# Patient Record
Sex: MALE | Race: WHITE | Employment: OTHER | URBAN - METROPOLITAN AREA
[De-identification: names, ages, dates, MRNs, and addresses within clinical notes are randomized per-mention and may not be internally consistent; named-entity substitution may affect disease eponyms.]

---

## 2017-01-31 ENCOUNTER — OFFICE VISIT (OUTPATIENT)
Dept: ENDOCRINOLOGY | Age: 67
End: 2017-01-31

## 2017-01-31 VITALS
SYSTOLIC BLOOD PRESSURE: 150 MMHG | WEIGHT: 223 LBS | HEART RATE: 87 BPM | DIASTOLIC BLOOD PRESSURE: 81 MMHG | HEIGHT: 78 IN | BODY MASS INDEX: 25.8 KG/M2

## 2017-01-31 DIAGNOSIS — I10 HTN (HYPERTENSION), BENIGN: ICD-10-CM

## 2017-01-31 DIAGNOSIS — R79.89 LOW TESTOSTERONE: Primary | ICD-10-CM

## 2017-01-31 DIAGNOSIS — B07.8 OTHER VIRAL WARTS: ICD-10-CM

## 2017-01-31 RX ORDER — BUPROPION HYDROCHLORIDE 300 MG/1
450 TABLET ORAL
COMMUNITY
Start: 2017-01-23 | End: 2019-01-24

## 2017-01-31 RX ORDER — DICLOFENAC SODIUM 10 MG/G
2 GEL TOPICAL
Status: ON HOLD | COMMUNITY
Start: 2017-01-21 | End: 2019-01-24 | Stop reason: SDUPTHER

## 2017-01-31 RX ORDER — TESTOSTERONE CYPIONATE 200 MG/ML
INJECTION INTRAMUSCULAR
Qty: 10 ML | Refills: 2 | Status: SHIPPED | OUTPATIENT
Start: 2017-01-31 | End: 2017-08-08 | Stop reason: SDUPTHER

## 2017-01-31 RX ORDER — GABAPENTIN 300 MG/1
CAPSULE ORAL
COMMUNITY
Start: 2017-01-13 | End: 2018-06-28

## 2017-01-31 RX ORDER — CHOLECALCIFEROL (VITAMIN D3) 125 MCG
CAPSULE ORAL
Status: ON HOLD | COMMUNITY
End: 2019-01-13

## 2017-01-31 NOTE — MR AVS SNAPSHOT
Visit Information Date & Time Provider Department Dept. Phone Encounter #  
 1/31/2017  2:10 PM Astrid Bowen, [address] [place] [address] Diabetes and Endocrinology [phone] Follow-up Instructions Return in about 6 months (around 7/31/2017). Upcoming Health Maintenance Date Due Hepatitis C Screening 1950 DTaP/Tdap/Td series (1 - Tdap) 1/8/1972 FOBT Q 1 YEAR AGE 50-75 1/8/2001 ZOSTER VACCINE AGE 60> 1/8/2011 GLAUCOMA SCREENING Q2Y 1/8/2016 MEDICARE YEARLY EXAM 1/8/2016 INFLUENZA AGE 9 TO ADULT 8/1/2016 Pneumococcal 65+ Low/Medium Risk (2 of 2 - PPSV23) 10/31/2017 Allergies as of 1/31/2017  Review Complete On: 1/31/2017 By: Astrid Bowen MD  
  
 Severity Noted Reaction Type Reactions Amitriptyline  09/11/2013    Other (comments)  
 hallucinating Neurontin [Gabapentin]  09/11/2013    Other (comments)  
 insomnia Percocet [Oxycodone-acetaminophen]  09/09/2014    Nausea Only, Anxiety Pt states he can tolerate small doses at 10 mg a day Current Immunizations  Reviewed on 10/31/2012 Name Date Influenza Vaccine 12/9/2014 Influenza Vaccine Split 10/31/2012, 10/1/2011 Pneumococcal Vaccine (Unspecified Type) 10/31/2012 Not reviewed this visit You Were Diagnosed With   
  
 Codes Comments Low testosterone    -  Primary ICD-10-CM: E29.1 ICD-9-CM: 257.2 HTN (hypertension), benign     ICD-10-CM: I10 
ICD-9-CM: 401.1 Vitals BP Pulse Height(growth percentile) Weight(growth percentile) BMI Smoking Status 150/81 87 6' 7\" (2.007 m) 223 lb (101.2 kg) 25.12 kg/m2 Never Smoker Vitals History BMI and BSA Data Body Mass Index Body Surface Area  
 25.12 kg/m 2 2.38 m 2 Preferred Pharmacy Pharmacy Name Phone Kaleb Diaz [address] [address] [facility] [phone] Your Updated Medication List  
  
   
 This list is accurate as of: 1/31/17  3:17 PM.  Always use your most recent med list.  
  
  
  
  
 ALEVE 220 mg Cap Generic drug:  naproxen sodium Take  by mouth. BD LUER-HANG SYRINGE 3 mL 21 gauge x 1\" Syrg Generic drug:  Syringe with Needle (Disp) USE AS DIRECTED INTRAMUSCULARLY EVERY 7 DAYS  
  
 buPROPion  mg XL tablet Commonly known as:  WELLBUTRIN XL  
  
 CALCIUM 600 WITH VITAMIN D3 600 mg(1,500mg) -400 unit Chew Generic drug:  Calcium-Cholecalciferol (D3)  
  
 carvedilol 12.5 mg tablet Commonly known as:  Layman Duarte Take 1 Tab by mouth two (2) times daily (with meals). CYMBALTA 60 mg capsule Generic drug:  DULoxetine Take 120 mg by mouth daily. gabapentin 300 mg capsule Commonly known as:  NEURONTIN HYDROcodone-acetaminophen  mg tablet Commonly known as:  Jyoti Nando Take  by mouth. 5 tabs daily  
  
 lisinopril 40 mg tablet Commonly known as:  Bermeo Donovan Take 1 tablet by mouth daily. LORazepam 1 mg tablet Commonly known as:  ATIVAN Take 1 Tab by mouth every eight (8) hours as needed. LYRICA 225 mg capsule Generic drug:  pregabalin 225 mg two (2) times a day. methadone 10 mg tablet Commonly known as:  DOLOPHINE Take 10 mg by mouth two (2) times a day. methylphenidate 10 mg tablet Commonly known as:  RITALIN  
3 tabs daily  
  
 omeprazole 20 mg capsule Commonly known as:  PRILOSEC  
TAKE ONE CAPSULE BY MOUTH EVERY DAY  
  
 polyethylene glycol 17 gram/dose powder Commonly known as:  MIRALAX  
  
 sildenafil 20 mg tablet Commonly known as:  REVATIO  
2-3 tablets daily as needed  
  
 testosterone cypionate 200 mg/mL injection Commonly known as:  DEPOTESTOTERONE CYPIONATE INJECT 0.5 ML INTRAMUSCULARLY EVERY 7 DAYS  
  
 tiZANidine 2 mg tablet Commonly known as:  ZANAFLEX  
  
 traZODone 150 mg tablet Commonly known as:  DESYREL  
TAKE ONE TABLET BY MOUTH EVERY EVENING  
  
 VOLTAREN 1 % Gel Generic drug:  diclofenac We Performed the Following HGB & HCT [03849 CPT(R)] TESTOSTERONE, FREE & TOTAL [52235 CPT(R)] Follow-up Instructions Return in about 6 months (around 7/31/2017). Patient Instructions Continue 0.5 ml testosterone Introducing [facility] SERVICES! Dear Raine Angel: Thank you for requesting a TripLingo account. Our records indicate that you already have an active TripLingo account. You can access your account anytime at [email or web address] Did you know that you can access your hospital and ER discharge instructions at any time in TripLingo? You can also review all of your test results from your hospital stay or ER visit. Additional Information If you have questions, please visit the Frequently Asked Questions section of the TripLingo website at [email or web address] Remember, TripLingo is NOT to be used for urgent needs. For medical emergencies, dial 911. Now available from your iPhone and Android! Please provide this summary of care documentation to your next provider. Your primary care clinician is listed as Neto Montemayor. If you have any questions after today's visit, please call [phone].

## 2017-01-31 NOTE — PROGRESS NOTES
History of Present Illness: Gayatri Alfaro is a 78 y.o. male presents for follow-up of low testosterone  He also has spinal stenosis and chronic pain. PCP is Dr Mady Wadsworth    HTN -carvedilol 12.5 mg BID and lisinopril 40 mg. Hypogonadism: taking testosterone cypionate 200 mg/ml -0.5 ml (100 mg) every 7 days on Fridays     No sx of BPH      Past Medical History   Diagnosis Date    Anemia NEC     Asthma     Chronic pain      Due to spinal stenosis    Constipation     HTN (hypertension)     Hypertension     Hypogonadism male     Other ill-defined conditions(379.89)      spinal stenosis    Psychiatric disorder      major depressive disorder    Sleep trouble     Spinal stenosis      Cervical and Lumbar    Vertebral compression fracture (HCC)      Current Outpatient Prescriptions   Medication Sig    naproxen sodium (ALEVE) 220 mg cap Take  by mouth.  GABAPENTIN PO Take  by mouth two (2) times a day.  BD LUER-HANG SYRINGE 3 mL 21 gauge x 1\" syrg USE AS DIRECTED INTRAMUSCULARLY EVERY 7 DAYS    sildenafil (REVATIO) 20 mg tablet 2-3 tablets daily as needed    testosterone cypionate (DEPOTESTOTERONE CYPIONATE) 200 mg/mL injection INJECT 0.5 ML INTRAMUSCULARLY EVERY 7 DAYS    methylphenidate (RITALIN) 10 mg tablet 3 tabs daily    LYRICA 225 mg capsule 225 mg two (2) times a day.  tiZANidine (ZANAFLEX) 2 mg tablet     CALCIUM 600 WITH VITAMIN D3 600 mg(1,500mg) -400 unit chew     methadone (DOLOPHINE) 10 mg tablet Take 10 mg by mouth two (2) times a day.  HYDROcodone-acetaminophen (NORCO)  mg tablet Take  by mouth. 5 tabs daily    carvedilol (COREG) 12.5 mg tablet Take 1 Tab by mouth two (2) times daily (with meals).  lisinopril (PRINIVIL, ZESTRIL) 40 mg tablet Take 1 tablet by mouth daily.     polyethylene glycol (MIRALAX) 17 gram/dose powder     traZODone (DESYREL) 150 mg tablet TAKE ONE TABLET BY MOUTH EVERY EVENING    LORazepam (ATIVAN) 1 mg tablet Take 1 Tab by mouth every eight (8) hours as needed.  omeprazole (PRILOSEC) 20 mg capsule TAKE ONE CAPSULE BY MOUTH EVERY DAY    DULoxetine (CYMBALTA) 60 mg capsule Take 120 mg by mouth daily.  buPROPion XL (WELLBUTRIN XL) 150 mg tablet Take 300 mg by mouth every twenty-four (24) hours.  Cyanocobalamin-Cobamamide 5,000-100 mcg lozg     fluticasone (FLONASE) 50 mcg/actuation nasal spray PLACE TWO SPRAYS IN EACH NOSTRIL DAILY     No current facility-administered medications for this visit. Allergies   Allergen Reactions    Amitriptyline Other (comments)     hallucinating    Neurontin [Gabapentin] Other (comments)     insomnia    Percocet [Oxycodone-Acetaminophen] Nausea Only and Anxiety     Pt states he can tolerate small doses at 10 mg a day     Physical Examination:  Visit Vitals    /81    Pulse 87    Ht 6' 7\" (2.007 m)    Wt 223 lb (101.2 kg)    BMI 25.12 kg/m2   -   - General: pleasant, no distress, normal gait   HEENT: hearing intact, EOMI, clear sclera without icterus  - Cardiovascular: regular, normal rate   - Respiratory: normal effort  - Integumentary: no edema. + 2-3 mm wart vs callus over palmar surface of distal middle finger  - Psychiatric: normal mood and affect    Data Reviewed:   Component      Latest Ref Rng & Units 12/22/2015 12/22/2015           3:22 PM  3:22 PM   Testosterone      348 - 1197 ng/dL  552   Comment        Comment   HGB      12.6 - 17.7 g/dL 15.2    HCT      37.5 - 51.0 % 46.2        Assessment/Plan:   1. Low testosterone   Labs on 0.5 ml. Continue    2. HTN (hypertension), benign   - says BP is better other locations  - continue carvedilol and lisinopril. Assure no missed doses     3. Wart: - on right index finger- recommended OTC salicylic acid      Patient Instructions   Continue 0.5 ml testosterone            Follow-up Disposition:  Return in about 6 months (around 7/31/2017).     Copy sent to:

## 2017-02-01 LAB
COMMENT: NORMAL
HCT VFR BLD AUTO: 40.1 % (ref 37.5–51)
HGB BLD-MCNC: 13.3 G/DL (ref 12.6–17.7)
TESTOST FREE SERPL-MCNC: 12.7 PG/ML (ref 6.6–18.1)
TESTOST SERPL-MCNC: 748 NG/DL (ref 348–1197)

## 2017-04-28 ENCOUNTER — HOSPITAL ENCOUNTER (OUTPATIENT)
Dept: CT IMAGING | Age: 67
Discharge: HOME | End: 2017-04-28
Attending: FAMILY MEDICINE
Payer: MEDICARE

## 2017-04-28 DIAGNOSIS — R10.9 ABDOMINAL PAIN: ICD-10-CM

## 2017-04-28 LAB — CREAT BLD-MCNC: 0.8 MG/DL (ref 0.6–1.3)

## 2017-04-28 PROCEDURE — 74011000255 HC RX REV CODE- 255: Performed by: FAMILY MEDICINE

## 2017-04-28 PROCEDURE — 82565 ASSAY OF CREATININE: CPT

## 2017-04-28 PROCEDURE — 74177 CT ABD & PELVIS W/CONTRAST: CPT

## 2017-04-28 PROCEDURE — 74011636320 HC RX REV CODE- 636/320: Performed by: FAMILY MEDICINE

## 2017-04-28 PROCEDURE — 74011250636 HC RX REV CODE- 250/636: Performed by: FAMILY MEDICINE

## 2017-04-28 RX ORDER — BARIUM SULFATE 20 MG/ML
900 SUSPENSION ORAL
Status: COMPLETED | OUTPATIENT
Start: 2017-04-28 | End: 2017-04-28

## 2017-04-28 RX ORDER — SODIUM CHLORIDE 9 MG/ML
50 INJECTION, SOLUTION INTRAVENOUS
Status: COMPLETED | OUTPATIENT
Start: 2017-04-28 | End: 2017-04-28

## 2017-04-28 RX ORDER — SODIUM CHLORIDE 0.9 % (FLUSH) 0.9 %
10 SYRINGE (ML) INJECTION
Status: COMPLETED | OUTPATIENT
Start: 2017-04-28 | End: 2017-04-28

## 2017-04-28 RX ADMIN — Medication 10 ML: at 12:00

## 2017-04-28 RX ADMIN — BARIUM SULFATE 900 ML: 21 SUSPENSION ORAL at 12:00

## 2017-04-28 RX ADMIN — SODIUM CHLORIDE 50 ML/HR: 900 INJECTION, SOLUTION INTRAVENOUS at 12:00

## 2017-04-28 RX ADMIN — IOPAMIDOL 100 ML: 755 INJECTION, SOLUTION INTRAVENOUS at 12:00

## 2017-07-07 RX ORDER — SYRINGE WITH NEEDLE, 1 ML 25GX5/8"
SYRINGE, EMPTY DISPOSABLE MISCELLANEOUS
Qty: 9 SYRINGE | Refills: 1 | Status: SHIPPED | OUTPATIENT
Start: 2017-07-07 | End: 2018-06-28 | Stop reason: SDUPTHER

## 2017-08-08 RX ORDER — TESTOSTERONE CYPIONATE 200 MG/ML
INJECTION INTRAMUSCULAR
Qty: 10 ML | Refills: 2 | Status: SHIPPED | OUTPATIENT
Start: 2017-08-08 | End: 2018-02-08 | Stop reason: SDUPTHER

## 2017-09-08 ENCOUNTER — OFFICE VISIT (OUTPATIENT)
Dept: ENDOCRINOLOGY | Age: 67
End: 2017-09-08

## 2017-09-08 VITALS
WEIGHT: 223 LBS | DIASTOLIC BLOOD PRESSURE: 82 MMHG | HEART RATE: 82 BPM | BODY MASS INDEX: 25.8 KG/M2 | RESPIRATION RATE: 16 BRPM | OXYGEN SATURATION: 98 % | SYSTOLIC BLOOD PRESSURE: 152 MMHG | HEIGHT: 78 IN

## 2017-09-08 DIAGNOSIS — I10 ESSENTIAL HYPERTENSION: ICD-10-CM

## 2017-09-08 DIAGNOSIS — G89.29 OTHER CHRONIC PAIN: ICD-10-CM

## 2017-09-08 DIAGNOSIS — R79.89 LOW TESTOSTERONE: Primary | ICD-10-CM

## 2017-09-08 RX ORDER — CARVEDILOL 25 MG/1
25 TABLET ORAL 2 TIMES DAILY WITH MEALS
Qty: 180 TAB | Refills: 3 | Status: SHIPPED | OUTPATIENT
Start: 2017-09-08 | End: 2018-11-13 | Stop reason: SDUPTHER

## 2017-09-08 NOTE — PROGRESS NOTES
History of Present Illness: Pato Galloway is a 78 y.o. male presents for follow-up of low testosterone  He also has spinal stenosis and chronic pain. PCP is Dr Kate Carlos    HTN -carvedilol 12.5 mg BID and lisinopril 40 mg.   BP consistently high in our office  Vitals 9/8/2017 1/31/2017 6/21/2016   Blood Pressure 152/82 150/81 151/90   Pulse 82 87 88     Hypogonadism: taking testosterone cypionate 200 mg/ml -0.5 ml (100 mg) every 7 days on Fridays     No sx of BPH  Continues using methadone at same strength. May have knee surgery. Past Medical History:   Diagnosis Date    Anemia NEC     Asthma     Chronic pain     Due to spinal stenosis    Constipation     HTN (hypertension)     Hypertension     Hypogonadism male     Other ill-defined conditions     spinal stenosis    Psychiatric disorder     major depressive disorder    Sleep trouble     Spinal stenosis     Cervical and Lumbar    Vertebral compression fracture (HCC)      Current Outpatient Prescriptions   Medication Sig    testosterone cypionate (DEPOTESTOTERONE CYPIONATE) 200 mg/mL injection INJECT 0.5 ML INTRAMUSCULARLY EVERY 7 DAYS    naproxen sodium (ALEVE) 220 mg cap Take  by mouth.  buPROPion XL (WELLBUTRIN XL) 300 mg XL tablet     VOLTAREN 1 % gel     gabapentin (NEURONTIN) 300 mg capsule     methylphenidate (RITALIN) 10 mg tablet 3 tabs daily    LYRICA 225 mg capsule 225 mg two (2) times a day.  CALCIUM 600 WITH VITAMIN D3 600 mg(1,500mg) -400 unit chew     methadone (DOLOPHINE) 10 mg tablet Take 10 mg by mouth two (2) times a day.  HYDROcodone-acetaminophen (NORCO)  mg tablet Take  by mouth. 5 tabs daily    carvedilol (COREG) 12.5 mg tablet Take 1 Tab by mouth two (2) times daily (with meals).  lisinopril (PRINIVIL, ZESTRIL) 40 mg tablet Take 1 tablet by mouth daily.     polyethylene glycol (MIRALAX) 17 gram/dose powder     traZODone (DESYREL) 150 mg tablet TAKE ONE TABLET BY MOUTH EVERY EVENING    LORazepam (ATIVAN) 1 mg tablet Take 1 Tab by mouth every eight (8) hours as needed.  omeprazole (PRILOSEC) 20 mg capsule TAKE ONE CAPSULE BY MOUTH EVERY DAY    DULoxetine (CYMBALTA) 60 mg capsule Take 120 mg by mouth daily.  BD LUER-HANG SYRINGE 3 mL 21 gauge x 1\" syrg USE AS DIRECTED INTRAMUSCULARLY EVERY 7 DAYS    sildenafil (REVATIO) 20 mg tablet 2-3 tablets daily as needed    tiZANidine (ZANAFLEX) 2 mg tablet      No current facility-administered medications for this visit. Allergies   Allergen Reactions    Amitriptyline Other (comments)     hallucinating    Neurontin [Gabapentin] Other (comments)     insomnia    Percocet [Oxycodone-Acetaminophen] Nausea Only and Anxiety     Pt states he can tolerate small doses at 10 mg a day       Review of Systems:  - Eyes: no blurry vision or double vision  - Cardiovascular: no chest pain  - Respiratory: no shortness of breath  - Musculoskeletal: + chronic pain in back and legs  - Neurological: no numbness/tingling in extremities    Physical Examination:  Visit Vitals    /82    Pulse 82    Resp 16    Ht 6' 7\" (2.007 m)    Wt 223 lb (101.2 kg)    SpO2 98%    BMI 25.12 kg/m2   -   - General: pleasant, no distress, uses cane   HEENT: hearing intact, EOMI, clear sclera without icterus  - Cardiovascular: regular, normal rate   - Respiratory: normal effort  - Integumentary: no edema  - Psychiatric: normal mood and affect    Data Reviewed:   Component      Latest Ref Rng & Units 1/31/2017 1/31/2017 12/22/2015 4/20/2015           3:32 PM  3:32 PM  3:22 PM 12:41 PM   Testosterone      348 - 1197 ng/dL 748  552 423   Comment       Comment  Comment Comment   Free testosterone (Direct)      6.6 - 18.1 pg/mL 12.7   8.5   HGB      12.6 - 17.7 g/dL  13.3     HCT      37.5 - 51.0 %  40.1       Assessment/Plan:   1. Low testosterone   - clinically stable  - continue 0.5 ml/100 mg weekly  - labs to follow   2.  Essential hypertension   - not controlled  - continue lisinopril 40 mg  - increase carvedilol to 25 mg twice daily. Discussed how observational study in [address] showed that beta-blocker use was associated with lower pain and less opiate pain medication use. 3. Other chronic pain   - see above. Carvedilol may help. Patient Instructions   Continue 0.5 ml testosterone    Blood pressure  Increase carvedilol to 25 mg twice daily  Continue lisinopril 40 mg        Follow-up Disposition:  Return in about 6 months (around 3/8/2018).     Copy sent to:

## 2017-09-08 NOTE — LETTER
9/12/2017 3:23 PM 
 
Mr. Michael Zhao [address] [facility] [address] [id number] Dear Michael Zhao: 
 
Please find your most recent results below. Resulted Orders TESTOSTERONE, FREE & TOTAL Result Value Ref Range Testosterone >1500 (H) 264 - 916 ng/dL Comment:  
   Adult male reference interval is based on a population of 
healthy nonobese males (BMI <30) between 23and 44years old. [address] [place] [address], [address] [place] [address] [phone]. PMID: [id number]. Free testosterone (Direct) >50.0 (H) 6.6 - 18.1 pg/mL Narrative Performed at:  [address]  [id number] : Ava Boggs MD, Phone:  [phone] CBC WITH AUTOMATED DIFF Result Value Ref Range WBC 7.8 3.4 - 10.8 x10E3/uL  
 RBC 4.40 4. 14 - 5.80 x10E6/uL HGB 13.3 12.6 - 17.7 g/dL HCT 40.1 37.5 - 51.0 % MCV 91 79 - 97 fL  
 MCH 30.2 26.6 - 33.0 pg  
 MCHC 33.2 31.5 - 35.7 g/dL  
 RDW 13.6 12.3 - 15.4 % PLATELET 033 358 - 163 x10E3/uL NEUTROPHILS 60 % Lymphocytes 19 % MONOCYTES 14 % EOSINOPHILS 5 % BASOPHILS 1 %  
 ABS. NEUTROPHILS 4.7 1.4 - 7.0 x10E3/uL Abs Lymphocytes 1.5 0.7 - 3.1 x10E3/uL  
 ABS. MONOCYTES 1.1 (H) 0.1 - 0.9 x10E3/uL  
 ABS. EOSINOPHILS 0.4 0.0 - 0.4 x10E3/uL  
 ABS. BASOPHILS 0.1 0.0 - 0.2 x10E3/uL IMMATURE GRANULOCYTES 1 %  
 ABS. IMM. GRANS. 0.0 0.0 - 0.1 x10E3/uL Narrative Performed at:  [address]  [id number] : Ava Boggs MD, Phone:  [phone] RECOMMENDATIONS: 
Prior levels were very stable. Testosterone levels are much higher this time. We would like to confirm when your last testosterone injection was prior to the lab draw. Please call me if you have any questions: [phone] Sincerely, 
 
 
Yamileth Dorsey MD

## 2017-09-08 NOTE — PATIENT INSTRUCTIONS
Continue 0.5 ml testosterone    Blood pressure  Increase carvedilol to 25 mg twice daily  Continue lisinopril 40 mg

## 2017-09-08 NOTE — MR AVS SNAPSHOT
Visit Information Date & Time Provider Department Dept. Phone Encounter #  
 9/8/2017  1:30 PM Nick Morocho, [address] [facility] Diabetes and Endocrinology [phone] [id number] Follow-up Instructions Return in about 6 months (around 3/8/2018). Upcoming Health Maintenance Date Due Hepatitis C Screening 1950 DTaP/Tdap/Td series (1 - Tdap) 1/8/1972 FOBT Q 1 YEAR AGE 50-75 1/8/2001 ZOSTER VACCINE AGE 60> 11/8/2010 GLAUCOMA SCREENING Q2Y 1/8/2016 MEDICARE YEARLY EXAM 1/8/2016 INFLUENZA AGE 9 TO ADULT 8/1/2017 Pneumococcal 65+ Low/Medium Risk (2 of 2 - PPSV23) 10/31/2017 Allergies as of 9/8/2017  Review Complete On: 9/8/2017 By: Nick Morocho MD  
  
 Severity Noted Reaction Type Reactions Amitriptyline  09/11/2013    Other (comments)  
 hallucinating Neurontin [Gabapentin]  09/11/2013    Other (comments)  
 insomnia Percocet [Oxycodone-acetaminophen]  09/09/2014    Nausea Only, Anxiety Pt states he can tolerate small doses at 10 mg a day Current Immunizations  Reviewed on 10/31/2012 Name Date Influenza Vaccine 12/9/2014 Influenza Vaccine Split 10/31/2012, 10/1/2011 ZZZ-RETIRED (DO NOT USE) Pneumococcal Vaccine (Unspecified Type) 10/31/2012 Not reviewed this visit You Were Diagnosed With   
  
 Codes Comments Low testosterone    -  Primary ICD-10-CM: E29.1 ICD-9-CM: 257.2 Essential hypertension     ICD-10-CM: I10 
ICD-9-CM: 401.9 Other chronic pain     ICD-10-CM: G89.29 ICD-9-CM: 338.29 Vitals BP Pulse Resp Height(growth percentile) Weight(growth percentile) SpO2  
 152/82 82 16 6' 7\" (2.007 m) 223 lb (101.2 kg) 98% BMI Smoking Status 25.12 kg/m2 Never Smoker Vitals History BMI and BSA Data Body Mass Index Body Surface Area  
 25.12 kg/m 2 2.38 m 2 [phone] Your Updated Medication List  
  
   
This list is accurate as of: 9/8/17  2:08 PM.  Always use your most recent med list.  
  
  
  
  
 ALEVE 220 mg Cap Generic drug:  naproxen sodium Take  by mouth. BD LUER-HANG SYRINGE 3 mL 21 gauge x 1\" Syrg Generic drug:  Syringe with Needle (Disp) USE AS DIRECTED INTRAMUSCULARLY EVERY 7 DAYS  
  
 buPROPion  mg XL tablet Commonly known as:  WELLBUTRIN XL  
  
 CALCIUM 600 WITH VITAMIN D3 600 mg(1,500mg) -400 unit Chew Generic drug:  Calcium-Cholecalciferol (D3)  
  
 carvedilol 25 mg tablet Commonly known as:  Neal Place Take 1 Tab by mouth two (2) times daily (with meals). CYMBALTA 60 mg capsule Generic drug:  DULoxetine Take 120 mg by mouth daily. gabapentin 300 mg capsule Commonly known as:  NEURONTIN HYDROcodone-acetaminophen  mg tablet Commonly known as:  Cait Christina Take  by mouth. 5 tabs daily  
  
 lisinopril 40 mg tablet Commonly known as:  Valene Pencil Take 1 tablet by mouth daily. LORazepam 1 mg tablet Commonly known as:  ATIVAN Take 1 Tab by mouth every eight (8) hours as needed. LYRICA 225 mg capsule Generic drug:  pregabalin 225 mg two (2) times a day. methadone 10 mg tablet Commonly known as:  DOLOPHINE Take 10 mg by mouth two (2) times a day. methylphenidate HCl 10 mg tablet Commonly known as:  RITALIN  
3 tabs daily  
  
 omeprazole 20 mg capsule Commonly known as:  PRILOSEC  
TAKE ONE CAPSULE BY MOUTH EVERY DAY  
  
 polyethylene glycol 17 gram/dose powder Commonly known as:  MIRALAX  
  
 testosterone cypionate 200 mg/mL injection Commonly known as:  DEPOTESTOTERONE CYPIONATE INJECT 0.5 ML INTRAMUSCULARLY EVERY 7 DAYS  
  
 tiZANidine 2 mg tablet Commonly known as:  ZANAFLEX  
  
 traZODone 150 mg tablet Commonly known as:  Illene Dus  
 TAKE ONE TABLET BY MOUTH EVERY EVENING  
  
 VOLTAREN 1 % Gel Generic drug:  diclofenac Prescriptions Sent to Pharmacy Refills  
 carvedilol (COREG) 25 mg tablet 3 Sig: Take 1 Tab by mouth two (2) times daily (with meals). Class: Normal  
 [facility] [address] [address] [facility] [phone] Route: Oral  
  
We Performed the Following CBC WITH AUTOMATED DIFF [03689 CPT(R)] TESTOSTERONE, FREE & TOTAL [98463 CPT(R)] Follow-up Instructions Return in about 6 months (around 3/8/2018). Patient Instructions Continue 0.5 ml testosterone Blood pressure Increase carvedilol to 25 mg twice daily Continue lisinopril 40 mg Introducing [facility]! Dear Reg Leonard: Thank you for requesting a SodaHead account. Our records indicate that you already have an active SodaHead account. You can access your account anytime at [email or web address] Did you know that you can access your hospital and ER discharge instructions at any time in SodaHead? You can also review all of your test results from your hospital stay or ER visit. Additional Information If you have questions, please visit the Frequently Asked Questions section of the SodaHead website at [email or web address] Remember, SodaHead is NOT to be used for urgent needs. For medical emergencies, dial 911. Now available from your iPhone and Android! Please provide this summary of care documentation to your next provider. Your primary care clinician is listed as Reny Hernández. If you have any questions after today's visit, please call [phone].

## 2017-09-10 LAB
BASOPHILS # BLD AUTO: 0.1 X10E3/UL (ref 0–0.2)
BASOPHILS NFR BLD AUTO: 1 %
EOSINOPHIL # BLD AUTO: 0.4 X10E3/UL (ref 0–0.4)
EOSINOPHIL NFR BLD AUTO: 5 %
ERYTHROCYTE [DISTWIDTH] IN BLOOD BY AUTOMATED COUNT: 13.6 % (ref 12.3–15.4)
HCT VFR BLD AUTO: 40.1 % (ref 37.5–51)
HGB BLD-MCNC: 13.3 G/DL (ref 12.6–17.7)
IMM GRANULOCYTES # BLD: 0 X10E3/UL (ref 0–0.1)
IMM GRANULOCYTES NFR BLD: 1 %
LYMPHOCYTES # BLD AUTO: 1.5 X10E3/UL (ref 0.7–3.1)
LYMPHOCYTES NFR BLD AUTO: 19 %
MCH RBC QN AUTO: 30.2 PG (ref 26.6–33)
MCHC RBC AUTO-ENTMCNC: 33.2 G/DL (ref 31.5–35.7)
MCV RBC AUTO: 91 FL (ref 79–97)
MONOCYTES # BLD AUTO: 1.1 X10E3/UL (ref 0.1–0.9)
MONOCYTES NFR BLD AUTO: 14 %
NEUTROPHILS # BLD AUTO: 4.7 X10E3/UL (ref 1.4–7)
NEUTROPHILS NFR BLD AUTO: 60 %
PLATELET # BLD AUTO: 277 X10E3/UL (ref 150–379)
RBC # BLD AUTO: 4.4 X10E6/UL (ref 4.14–5.8)
TESTOST FREE SERPL-MCNC: >50 PG/ML (ref 6.6–18.1)
TESTOST SERPL-MCNC: >1500 NG/DL (ref 264–916)
WBC # BLD AUTO: 7.8 X10E3/UL (ref 3.4–10.8)

## 2017-09-10 NOTE — PROGRESS NOTES
Testosterone level much higher this time. He said he does injections on Fridays, but that he had not done the injection yet, so it should have been a day after last injection. Prior levels were very stable. His normal hemoglobin and hematocrit make it unlikely that his levels have been this high all the time. Melissa Espinoza,  Please call and mail lab letter. Please explain to him that his testosterone levels were much higher and confirm when his previous injection was. Thank you.

## 2017-09-12 NOTE — PROGRESS NOTES
Patient returned my call. He was mistaken at his visit and had taken a testosterone injection that day prior to the appointment. Patient stated there may have been a little extra in the bottle that he used up, but he typically does not inject extra.

## 2018-06-28 ENCOUNTER — OFFICE VISIT (OUTPATIENT)
Dept: ENDOCRINOLOGY | Age: 68
End: 2018-06-28

## 2018-06-28 VITALS
HEART RATE: 80 BPM | WEIGHT: 222 LBS | DIASTOLIC BLOOD PRESSURE: 87 MMHG | SYSTOLIC BLOOD PRESSURE: 174 MMHG | HEIGHT: 78 IN | BODY MASS INDEX: 25.69 KG/M2

## 2018-06-28 DIAGNOSIS — E34.9 TESTOSTERONE DEFICIENCY: Primary | ICD-10-CM

## 2018-06-28 DIAGNOSIS — R79.89 LOW TESTOSTERONE IN MALE: ICD-10-CM

## 2018-06-28 DIAGNOSIS — I10 ESSENTIAL HYPERTENSION: ICD-10-CM

## 2018-06-28 RX ORDER — ZOLPIDEM TARTRATE 10 MG/1
TABLET ORAL
Status: ON HOLD | COMMUNITY
End: 2019-01-15

## 2018-06-28 RX ORDER — ONDANSETRON 4 MG/1
4 TABLET, ORALLY DISINTEGRATING ORAL
COMMUNITY
End: 2019-01-24

## 2018-06-28 RX ORDER — TESTOSTERONE CYPIONATE 200 MG/ML
INJECTION INTRAMUSCULAR
Qty: 2 ML | Refills: 5 | Status: ON HOLD | OUTPATIENT
Start: 2018-06-28 | End: 2019-01-24 | Stop reason: SDUPTHER

## 2018-06-28 NOTE — PATIENT INSTRUCTIONS
Resume  0.5 ml testosterone weekly - can inject subcutaneously. Repeat labs on a Monday -Wednesday (middle of dosing interval) in 4 + weeks.      Blood pressure  Continue carvedilol to 25 mg twice daily  Continue lisinopril 40 mg

## 2018-06-28 NOTE — MR AVS SNAPSHOT
[address] 
[phone] Patient: Marialuisa Gardiner MRN: [id number] Visit Information Date & Time Provider Department Dept. Phone Encounter #  
 6/28/2018  1:30 PM Erlin Figueroa, [address] [facility] [phone] Follow-up Instructions Return in about 6 months (around 12/28/2018). Upcoming Health Maintenance Date Due Hepatitis C Screening 1950 DTaP/Tdap/Td series (1 - Tdap) 1/8/1972 FOBT Q 1 YEAR AGE 50-75 1/8/2001 ZOSTER VACCINE AGE 60> 11/8/2010 GLAUCOMA SCREENING Q2Y 1/8/2016 Pneumococcal 65+ Low/Medium Risk (2 of 2 - PPSV23) 10/31/2017 MEDICARE YEARLY EXAM 3/14/2018 Influenza Age 5 to Adult 8/1/2018 Allergies as of 6/28/2018  Review Complete On: 6/28/2018 By: Erlin Figueroa MD  
  
 Severity Noted Reaction Type Reactions Amitriptyline  09/11/2013    Other (comments)  
 hallucinating Neurontin [Gabapentin]  09/11/2013    Other (comments)  
 insomnia Percocet [Oxycodone-acetaminophen]  09/09/2014    Nausea Only, Anxiety Pt states he can tolerate small doses at 10 mg a day Current Immunizations  Reviewed on 10/31/2012 Name Date Influenza Vaccine 12/9/2014 Influenza Vaccine Split 10/31/2012, 10/1/2011 ZZZ-RETIRED (DO NOT USE) Pneumococcal Vaccine (Unspecified Type) 10/31/2012 Not reviewed this visit You Were Diagnosed With   
  
 Codes Comments Testosterone deficiency    -  Primary ICD-10-CM: E34.9 ICD-9-CM: 259.9 Essential hypertension     ICD-10-CM: I10 
ICD-9-CM: 401.9 Low testosterone in male     ICD-10-CM: R79.89 ICD-9-CM: 790.99 Vitals BP Pulse Height(growth percentile) Weight(growth percentile) BMI Smoking Status 174/87 80 6' 7\" (2.007 m) 222 lb (100.7 kg) 25.01 kg/m2 Never Smoker Vitals History BMI and BSA Data Body Mass Index Body Surface Area 25.01 kg/m 2 2.37 m 2 Preferred Pharmacy Pharmacy Name Phone [facility] [id number] [address] [facility] [phone] Your Updated Medication List  
  
   
This list is accurate as of 6/28/18  2:02 PM.  Always use your most recent med list.  
  
  
  
  
 ALEVE 220 mg Cap Generic drug:  naproxen sodium Take  by mouth. BD LUER-HANG SYRINGE 3 mL 21 gauge x 1\" Syrg Generic drug:  Syringe with Needle (Disp) USE AS DIRECTED INTRAMUSCULARLY EVERY 7 DAYS  
  
 buPROPion  mg XL tablet Commonly known as:  WELLBUTRIN XL  
  
 CALCIUM 600 WITH VITAMIN D3 600 mg(1,500mg) -400 unit Chew Generic drug:  Calcium-Cholecalciferol (D3)  
  
 carvedilol 25 mg tablet Commonly known as:  P[facility] [facility] Take 1 Tab by mouth two (2) times daily (with meals). CYMBALTA 60 mg capsule Generic drug:  DULoxetine Take 120 mg by mouth daily. HYDROcodone-acetaminophen  mg tablet Commonly known as:  Janas [place] Take  by mouth. 5 tabs daily  
  
 lisinopril 40 mg tablet Commonly known as:  Ruben [place] Take 1 tablet by mouth daily. LORazepam 1 mg tablet Commonly known as:  ATIVAN Take 1 Tab by mouth every eight (8) hours as needed. LYRICA 225 mg capsule Generic drug:  pregabalin 225 mg two (2) times a day. methadone 10 mg tablet Commonly known as:  DOLOPHINE Take 10 mg by mouth two (2) times a day. methylphenidate HCl 10 mg tablet Commonly known as:  RITALIN  
3 tabs daily  
  
 omeprazole 20 mg capsule Commonly known as:  PRILOSEC  
TAKE ONE CAPSULE BY MOUTH EVERY DAY  
  
 ONDANSETRON PO Take  by mouth.  
  
 polyethylene glycol 17 gram/dose powder Commonly known as:  MIRALAX  
  
 testosterone cypionate 200 mg/mL injection Commonly known as:  DEPOTESTOTERONE CYPIONATE INJECT 0.5 MILLILITERS ONCE EVERY 7 DAYS  
  
 tiZANidine 2 mg tablet Commonly known as:  Molinda Lola VOLTAREN 1 % Gel Generic drug:  diclofenac  
  
 zolpidem 10 mg tablet Commonly known as:  AMBIEN Take  by mouth nightly as needed for Sleep. Prescriptions Printed Refills  
 testosterone cypionate (DEPOTESTOTERONE CYPIONATE) 200 mg/mL injection 5 Sig: INJECT 0.5 MILLILITERS ONCE EVERY 7 DAYS Class: Print We Performed the Following HGB & HCT [15900 CPT(R)] TESTOSTERONE, FREE & TOTAL [51081 CPT(R)] Follow-up Instructions Return in about 6 months (around 12/28/2018). Patient Instructions Resume  0.5 ml testosterone weekly - can inject subcutaneously. Repeat labs on a Monday -Wednesday (middle of dosing interval) in 4 + weeks. Blood pressure Continue carvedilol to 25 mg twice daily Continue lisinopril 40 mg Introducing [facility] SERVICES! Dear Vanessa Florence: Thank you for requesting a [employer] account. Our records indicate that you already have an active [employer] account. You can access your account anytime at [email or web address] Did you know that you can access your hospital and ER discharge instructions at any time in [employer]? You can also review all of your test results from your hospital stay or ER visit. Additional Information If you have questions, please visit the Frequently Asked Questions section of the [employer] website at [email or web address] Remember, [employer] is NOT to be used for urgent needs. For medical emergencies, dial 911. Now available from your iPhone and Android! Please provide this summary of care documentation to your next provider. Your primary care clinician is listed as Julio Cesar Ibarra. If you have any questions after today's visit, please call [phone].

## 2018-06-28 NOTE — PROGRESS NOTES
History of Present Illness: Joanna Rucker is a 80 y.o. male presents for follow-up of low testosterone  He also has spinal stenosis and chronic pain. Has been taking methadone for this for years. He is following with Dr Daisy Calix and also with a pain specialist.     Knee problems are currently the main problem for him. Unfortunately he has bad teeth/gum problems and requires dental extractions prior to him having any knee surgery. He cannot afford the dental bills      Hypogonadism: was taking testosterone cypionate 200 mg/ml -0.5 ml (100 mg) every 7 days on Fridays, but stopped about 3 weeks ago. Wasn't sure he needed it. Was hoping to have less doctor visits as well. Does feel more tired since stopping. HTN -carvedilol 25 mg BID and lisinopril 40 mg. Reports better BP values with Dr Daisy Calix. Past Medical History:   Diagnosis Date    Anemia NEC     Asthma     Chronic pain     Due to spinal stenosis    Constipation     HTN (hypertension)     Hypertension     Hypogonadism male     Other ill-defined conditions(201.75)     spinal stenosis    Psychiatric disorder     major depressive disorder    Sleep trouble     Spinal stenosis     Cervical and Lumbar    Vertebral compression fracture (HCC)      Current Outpatient Prescriptions   Medication Sig    zolpidem (AMBIEN) 10 mg tablet Take  by mouth nightly as needed for Sleep.  ONDANSETRON PO Take  by mouth.  carvedilol (COREG) 25 mg tablet Take 1 Tab by mouth two (2) times daily (with meals).  BD LUER-HANG SYRINGE 3 mL 21 gauge x 1\" syrg USE AS DIRECTED INTRAMUSCULARLY EVERY 7 DAYS    naproxen sodium (ALEVE) 220 mg cap Take  by mouth.  buPROPion XL (WELLBUTRIN XL) 300 mg XL tablet     VOLTAREN 1 % gel     methylphenidate (RITALIN) 10 mg tablet 3 tabs daily    LYRICA 225 mg capsule 225 mg two (2) times a day.     tiZANidine (ZANAFLEX) 2 mg tablet     CALCIUM 600 WITH VITAMIN D3 600 mg(1,500mg) -400 unit chew     methadone (DOLOPHINE) 10 mg tablet Take 10 mg by mouth two (2) times a day.  HYDROcodone-acetaminophen (NORCO)  mg tablet Take  by mouth. 5 tabs daily    lisinopril (PRINIVIL, ZESTRIL) 40 mg tablet Take 1 tablet by mouth daily.  polyethylene glycol (MIRALAX) 17 gram/dose powder     LORazepam (ATIVAN) 1 mg tablet Take 1 Tab by mouth every eight (8) hours as needed.  omeprazole (PRILOSEC) 20 mg capsule TAKE ONE CAPSULE BY MOUTH EVERY DAY    DULoxetine (CYMBALTA) 60 mg capsule Take 120 mg by mouth daily.  testosterone cypionate (DEPOTESTOTERONE CYPIONATE) 200 mg/mL injection INJECT 0.5 MILLILITERS ONCE EVERY 7 DAYS    gabapentin (NEURONTIN) 300 mg capsule     traZODone (DESYREL) 150 mg tablet TAKE ONE TABLET BY MOUTH EVERY EVENING     No current facility-administered medications for this visit. Allergies   Allergen Reactions    Amitriptyline Other (comments)     hallucinating    Neurontin [Gabapentin] Other (comments)     insomnia    Percocet [Oxycodone-Acetaminophen] Nausea Only and Anxiety     Pt states he can tolerate small doses at 10 mg a day         Physical Examination:  Visit Vitals    /87    Pulse 80    Ht 6' 7\" (2.007 m)    Wt 222 lb (100.7 kg)    BMI 25.01 kg/m2   -   - General: pleasant, no distress, uses cane.  Has a limp   HEENT: hearing intact, EOMI, clear sclera without icterus  - Cardiovascular: regular, normal rate   - Respiratory: normal effort  - Integumentary: no edema  - Psychiatric: normal mood and affect    Data Reviewed:   Component      Latest Ref Rng & Units 9/8/2017 9/8/2017 1/31/2017 12/22/2015           2:27 PM  2:27 PM  3:32 PM  3:22 PM   WBC      3.4 - 10.8 x10E3/uL 7.8      RBC      4.14 - 5.80 x10E6/uL 4.40      HGB      12.6 - 17.7 g/dL 13.3      HCT      37.5 - 51.0 % 40.1      MCV      79 - 97 fL 91      MCH      26.6 - 33.0 pg 30.2      MCHC      31.5 - 35.7 g/dL 33.2      RDW      12.3 - 15.4 % 13.6      PLATELET      734 - 455 x10E3/uL 277 NEUTROPHILS      % 60      Lymphocytes      % 19      MONOCYTES      % 14      EOSINOPHILS      % 5      BASOPHILS      % 1      ABS. NEUTROPHILS      1.4 - 7.0 x10E3/uL 4.7      Abs Lymphocytes      0.7 - 3.1 x10E3/uL 1.5      ABS. MONOCYTES      0.1 - 0.9 x10E3/uL 1.1 (H)      ABS. EOSINOPHILS      0.0 - 0.4 x10E3/uL 0.4      ABS. BASOPHILS      0.0 - 0.2 x10E3/uL 0.1      IMMATURE GRANULOCYTES      % 1      ABS. IMM. GRANS.      0.0 - 0.1 x10E3/uL 0.0      Testosterone      264 - 916 ng/dL  >1500 (H) [phone]   Comment         Comment Comment   Free testosterone (Direct)      6.6 - 18.1 pg/mL  >50.0 (H) 12.7        Assessment/Plan:   1. Testosterone deficiency   - he felt better doing the injections. - With methadone treatment, testosterone would remain low without treatment. He and I agree he will resume treatment with 0.5 ml (100 mg) weekly. Recommend reassessing in 1 + month around middle of the dosing interval to verify values are at goal.     2. Essential hypertension   Defer to Dr Janna Rhodes. Patient Instructions   Resume  0.5 ml testosterone weekly - can inject subcutaneously. Repeat labs on a Monday -Wednesday (middle of dosing interval) in 4 + weeks. Blood pressure  Continue carvedilol to 25 mg twice daily  Continue lisinopril 40 mg        Follow-up Disposition:  Return in about 6 months (around 12/28/2018).     Copy sent to:

## 2018-11-13 RX ORDER — CARVEDILOL 25 MG/1
TABLET ORAL
Qty: 180 TAB | Refills: 2 | Status: SHIPPED | OUTPATIENT
Start: 2018-11-13 | End: 2019-07-26 | Stop reason: SDUPTHER

## 2019-01-12 ENCOUNTER — HOSPITAL ENCOUNTER (OUTPATIENT)
Age: 69
Setting detail: OBSERVATION
Discharge: HOME | End: 2019-01-24
Attending: EMERGENCY MEDICINE | Admitting: HOSPITALIST
Payer: MEDICARE

## 2019-01-12 ENCOUNTER — APPOINTMENT (OUTPATIENT)
Dept: CT IMAGING | Age: 69
End: 2019-01-12
Attending: EMERGENCY MEDICINE
Payer: MEDICARE

## 2019-01-12 DIAGNOSIS — L03.116 CELLULITIS OF LEFT LOWER EXTREMITY: Primary | ICD-10-CM

## 2019-01-12 DIAGNOSIS — G89.4 CHRONIC PAIN SYNDROME: ICD-10-CM

## 2019-01-12 DIAGNOSIS — E86.0 DEHYDRATION: ICD-10-CM

## 2019-01-12 DIAGNOSIS — F32.9 MAJOR DEPRESSIVE DISORDER, REMISSION STATUS UNSPECIFIED, UNSPECIFIED WHETHER RECURRENT: ICD-10-CM

## 2019-01-12 DIAGNOSIS — R29.6 FREQUENT FALLS: ICD-10-CM

## 2019-01-12 DIAGNOSIS — R79.89 LOW TESTOSTERONE IN MALE: ICD-10-CM

## 2019-01-12 LAB
ALBUMIN SERPL-MCNC: 2.7 G/DL (ref 3.5–5)
ALBUMIN/GLOB SERPL: 0.8 {RATIO} (ref 1.1–2.2)
ALP SERPL-CCNC: 86 U/L (ref 45–117)
ALT SERPL-CCNC: 31 U/L (ref 12–78)
ANION GAP SERPL CALC-SCNC: 10 MMOL/L (ref 5–15)
AST SERPL-CCNC: 61 U/L (ref 15–37)
BASOPHILS # BLD: 0 K/UL (ref 0–0.1)
BASOPHILS NFR BLD: 0 % (ref 0–1)
BILIRUB SERPL-MCNC: 1.4 MG/DL (ref 0.2–1)
BUN SERPL-MCNC: 36 MG/DL (ref 6–20)
BUN/CREAT SERPL: 29 (ref 12–20)
CALCIUM SERPL-MCNC: 8.9 MG/DL (ref 8.5–10.1)
CHLORIDE SERPL-SCNC: 103 MMOL/L (ref 97–108)
CO2 SERPL-SCNC: 27 MMOL/L (ref 21–32)
CREAT SERPL-MCNC: 1.25 MG/DL (ref 0.7–1.3)
DIFFERENTIAL METHOD BLD: ABNORMAL
EOSINOPHIL # BLD: 0 K/UL (ref 0–0.4)
EOSINOPHIL NFR BLD: 0 % (ref 0–7)
ERYTHROCYTE [DISTWIDTH] IN BLOOD BY AUTOMATED COUNT: 13.8 % (ref 11.5–14.5)
GLOBULIN SER CALC-MCNC: 3.4 G/DL (ref 2–4)
GLUCOSE SERPL-MCNC: 120 MG/DL (ref 65–100)
HCT VFR BLD AUTO: 36 % (ref 36.6–50.3)
HGB BLD-MCNC: 11.6 G/DL (ref 12.1–17)
IMM GRANULOCYTES # BLD AUTO: 0.1 K/UL (ref 0–0.04)
IMM GRANULOCYTES NFR BLD AUTO: 1 % (ref 0–0.5)
LYMPHOCYTES # BLD: 0.7 K/UL (ref 0.8–3.5)
LYMPHOCYTES NFR BLD: 5 % (ref 12–49)
MCH RBC QN AUTO: 30.2 PG (ref 26–34)
MCHC RBC AUTO-ENTMCNC: 32.2 G/DL (ref 30–36.5)
MCV RBC AUTO: 93.8 FL (ref 80–99)
MONOCYTES # BLD: 1.3 K/UL (ref 0–1)
MONOCYTES NFR BLD: 10 % (ref 5–13)
NEUTS SEG # BLD: 11.3 K/UL (ref 1.8–8)
NEUTS SEG NFR BLD: 84 % (ref 32–75)
NRBC # BLD: 0 K/UL (ref 0–0.01)
NRBC BLD-RTO: 0 PER 100 WBC
PLATELET # BLD AUTO: 190 K/UL (ref 150–400)
PMV BLD AUTO: 11 FL (ref 8.9–12.9)
POTASSIUM SERPL-SCNC: 4 MMOL/L (ref 3.5–5.1)
PROT SERPL-MCNC: 6.1 G/DL (ref 6.4–8.2)
RBC # BLD AUTO: 3.84 M/UL (ref 4.1–5.7)
RBC MORPH BLD: ABNORMAL
SODIUM SERPL-SCNC: 140 MMOL/L (ref 136–145)
WBC # BLD AUTO: 13.4 K/UL (ref 4.1–11.1)
WBC MORPH BLD: ABNORMAL

## 2019-01-12 PROCEDURE — 82550 ASSAY OF CK (CPK): CPT

## 2019-01-12 PROCEDURE — 70450 CT HEAD/BRAIN W/O DYE: CPT

## 2019-01-12 PROCEDURE — 96365 THER/PROPH/DIAG IV INF INIT: CPT

## 2019-01-12 PROCEDURE — 96372 THER/PROPH/DIAG INJ SC/IM: CPT

## 2019-01-12 PROCEDURE — 74011000258 HC RX REV CODE- 258: Performed by: EMERGENCY MEDICINE

## 2019-01-12 PROCEDURE — 36415 COLL VENOUS BLD VENIPUNCTURE: CPT

## 2019-01-12 PROCEDURE — 74011250636 HC RX REV CODE- 250/636: Performed by: EMERGENCY MEDICINE

## 2019-01-12 PROCEDURE — 85025 COMPLETE CBC W/AUTO DIFF WBC: CPT

## 2019-01-12 PROCEDURE — 96375 TX/PRO/DX INJ NEW DRUG ADDON: CPT

## 2019-01-12 PROCEDURE — 99285 EMERGENCY DEPT VISIT HI MDM: CPT

## 2019-01-12 PROCEDURE — 80053 COMPREHEN METABOLIC PANEL: CPT

## 2019-01-12 RX ORDER — KETOROLAC TROMETHAMINE 30 MG/ML
30 INJECTION, SOLUTION INTRAMUSCULAR; INTRAVENOUS
Status: COMPLETED | OUTPATIENT
Start: 2019-01-12 | End: 2019-01-12

## 2019-01-12 RX ADMIN — KETOROLAC TROMETHAMINE 30 MG: 30 INJECTION, SOLUTION INTRAMUSCULAR; INTRAVENOUS at 22:28

## 2019-01-12 RX ADMIN — CEFTRIAXONE SODIUM 1 G: 1 INJECTION, POWDER, FOR SOLUTION INTRAMUSCULAR; INTRAVENOUS at 23:58

## 2019-01-12 RX ADMIN — SODIUM CHLORIDE 1000 ML: 900 INJECTION, SOLUTION INTRAVENOUS at 23:58

## 2019-01-13 PROBLEM — L03.90 CELLULITIS: Status: ACTIVE | Noted: 2019-01-13

## 2019-01-13 LAB
ANION GAP SERPL CALC-SCNC: 7 MMOL/L (ref 5–15)
BASOPHILS # BLD: 0 K/UL (ref 0–0.1)
BASOPHILS NFR BLD: 0 % (ref 0–1)
BUN SERPL-MCNC: 37 MG/DL (ref 6–20)
BUN/CREAT SERPL: 36 (ref 12–20)
CALCIUM SERPL-MCNC: 8.5 MG/DL (ref 8.5–10.1)
CHLORIDE SERPL-SCNC: 106 MMOL/L (ref 97–108)
CK SERPL-CCNC: 1324 U/L (ref 39–308)
CO2 SERPL-SCNC: 26 MMOL/L (ref 21–32)
CREAT SERPL-MCNC: 1.04 MG/DL (ref 0.7–1.3)
DIFFERENTIAL METHOD BLD: ABNORMAL
EOSINOPHIL # BLD: 0.1 K/UL (ref 0–0.4)
EOSINOPHIL NFR BLD: 1 % (ref 0–7)
ERYTHROCYTE [DISTWIDTH] IN BLOOD BY AUTOMATED COUNT: 13.8 % (ref 11.5–14.5)
GLUCOSE SERPL-MCNC: 117 MG/DL (ref 65–100)
HCT VFR BLD AUTO: 32.5 % (ref 36.6–50.3)
HGB BLD-MCNC: 10.7 G/DL (ref 12.1–17)
IMM GRANULOCYTES # BLD AUTO: 0 K/UL
IMM GRANULOCYTES NFR BLD AUTO: 0 %
LYMPHOCYTES # BLD: 1.6 K/UL (ref 0.8–3.5)
LYMPHOCYTES NFR BLD: 12 % (ref 12–49)
MCH RBC QN AUTO: 30.9 PG (ref 26–34)
MCHC RBC AUTO-ENTMCNC: 32.9 G/DL (ref 30–36.5)
MCV RBC AUTO: 93.9 FL (ref 80–99)
MONOCYTES # BLD: 0.7 K/UL (ref 0–1)
MONOCYTES NFR BLD: 5 % (ref 5–13)
NEUTS BAND NFR BLD MANUAL: 7 % (ref 0–6)
NEUTS SEG # BLD: 11.3 K/UL (ref 1.8–8)
NEUTS SEG NFR BLD: 75 % (ref 32–75)
NRBC # BLD: 0 K/UL (ref 0–0.01)
NRBC BLD-RTO: 0 PER 100 WBC
PLATELET # BLD AUTO: 193 K/UL (ref 150–400)
PMV BLD AUTO: 11.5 FL (ref 8.9–12.9)
POTASSIUM SERPL-SCNC: 4.1 MMOL/L (ref 3.5–5.1)
RBC # BLD AUTO: 3.46 M/UL (ref 4.1–5.7)
RBC MORPH BLD: ABNORMAL
SODIUM SERPL-SCNC: 139 MMOL/L (ref 136–145)
WBC # BLD AUTO: 13.7 K/UL (ref 4.1–11.1)

## 2019-01-13 PROCEDURE — 74011000258 HC RX REV CODE- 258: Performed by: EMERGENCY MEDICINE

## 2019-01-13 PROCEDURE — G0378 HOSPITAL OBSERVATION PER HR: HCPCS

## 2019-01-13 PROCEDURE — 74011250637 HC RX REV CODE- 250/637: Performed by: HOSPITALIST

## 2019-01-13 PROCEDURE — 74011000250 HC RX REV CODE- 250: Performed by: NEUROMUSCULOSKELETAL MEDICINE & OMM

## 2019-01-13 PROCEDURE — 96367 TX/PROPH/DG ADDL SEQ IV INF: CPT

## 2019-01-13 PROCEDURE — 96372 THER/PROPH/DIAG INJ SC/IM: CPT

## 2019-01-13 PROCEDURE — 96366 THER/PROPH/DIAG IV INF ADDON: CPT

## 2019-01-13 PROCEDURE — 74011250637 HC RX REV CODE- 250/637: Performed by: NEUROMUSCULOSKELETAL MEDICINE & OMM

## 2019-01-13 PROCEDURE — 74011250636 HC RX REV CODE- 250/636: Performed by: HOSPITALIST

## 2019-01-13 PROCEDURE — 99218 HC RM OBSERVATION: CPT

## 2019-01-13 PROCEDURE — 74011250636 HC RX REV CODE- 250/636: Performed by: EMERGENCY MEDICINE

## 2019-01-13 PROCEDURE — 85025 COMPLETE CBC W/AUTO DIFF WBC: CPT

## 2019-01-13 PROCEDURE — 36415 COLL VENOUS BLD VENIPUNCTURE: CPT

## 2019-01-13 PROCEDURE — 96361 HYDRATE IV INFUSION ADD-ON: CPT

## 2019-01-13 PROCEDURE — 80048 BASIC METABOLIC PNL TOTAL CA: CPT

## 2019-01-13 RX ORDER — BUPROPION HYDROCHLORIDE 150 MG/1
450 TABLET ORAL
Status: DISCONTINUED | OUTPATIENT
Start: 2019-01-14 | End: 2019-01-24 | Stop reason: HOSPADM

## 2019-01-13 RX ORDER — SODIUM CHLORIDE 9 MG/ML
100 INJECTION, SOLUTION INTRAVENOUS CONTINUOUS
Status: DISCONTINUED | OUTPATIENT
Start: 2019-01-13 | End: 2019-01-14

## 2019-01-13 RX ORDER — ENOXAPARIN SODIUM 100 MG/ML
40 INJECTION SUBCUTANEOUS EVERY 24 HOURS
Status: DISCONTINUED | OUTPATIENT
Start: 2019-01-13 | End: 2019-01-24 | Stop reason: HOSPADM

## 2019-01-13 RX ORDER — ACETAMINOPHEN 325 MG/1
650 TABLET ORAL
Status: DISCONTINUED | OUTPATIENT
Start: 2019-01-13 | End: 2019-01-24 | Stop reason: HOSPADM

## 2019-01-13 RX ORDER — HYDROCODONE BITARTRATE AND ACETAMINOPHEN 5; 325 MG/1; MG/1
1 TABLET ORAL
Status: DISCONTINUED | OUTPATIENT
Start: 2019-01-13 | End: 2019-01-13 | Stop reason: DRUGHIGH

## 2019-01-13 RX ORDER — TIZANIDINE 4 MG/1
2 TABLET ORAL
Status: DISCONTINUED | OUTPATIENT
Start: 2019-01-13 | End: 2019-01-24 | Stop reason: HOSPADM

## 2019-01-13 RX ORDER — SODIUM CHLORIDE 0.9 % (FLUSH) 0.9 %
5-40 SYRINGE (ML) INJECTION AS NEEDED
Status: DISCONTINUED | OUTPATIENT
Start: 2019-01-13 | End: 2019-01-24 | Stop reason: HOSPADM

## 2019-01-13 RX ORDER — ONDANSETRON 2 MG/ML
4 INJECTION INTRAMUSCULAR; INTRAVENOUS
Status: DISCONTINUED | OUTPATIENT
Start: 2019-01-13 | End: 2019-01-24 | Stop reason: HOSPADM

## 2019-01-13 RX ORDER — LORAZEPAM 1 MG/1
1 TABLET ORAL
Status: DISCONTINUED | OUTPATIENT
Start: 2019-01-13 | End: 2019-01-24 | Stop reason: HOSPADM

## 2019-01-13 RX ORDER — ZOLPIDEM TARTRATE 5 MG/1
10 TABLET ORAL
Status: DISCONTINUED | OUTPATIENT
Start: 2019-01-13 | End: 2019-01-24 | Stop reason: HOSPADM

## 2019-01-13 RX ORDER — POLYETHYLENE GLYCOL 3350 17 G/17G
17 POWDER, FOR SOLUTION ORAL
Status: DISCONTINUED | OUTPATIENT
Start: 2019-01-13 | End: 2019-01-24 | Stop reason: HOSPADM

## 2019-01-13 RX ORDER — METHADONE HYDROCHLORIDE 10 MG/1
20 TABLET ORAL 2 TIMES DAILY
Status: DISCONTINUED | OUTPATIENT
Start: 2019-01-13 | End: 2019-01-15

## 2019-01-13 RX ORDER — PREGABALIN 75 MG/1
225 CAPSULE ORAL 2 TIMES DAILY
Status: DISCONTINUED | OUTPATIENT
Start: 2019-01-13 | End: 2019-01-24 | Stop reason: HOSPADM

## 2019-01-13 RX ORDER — METHADONE HYDROCHLORIDE 10 MG/1
10 TABLET ORAL 2 TIMES DAILY
Status: DISCONTINUED | OUTPATIENT
Start: 2019-01-13 | End: 2019-01-13

## 2019-01-13 RX ORDER — METHYLPHENIDATE HYDROCHLORIDE 5 MG/1
10 TABLET ORAL 2 TIMES DAILY
Status: DISCONTINUED | OUTPATIENT
Start: 2019-01-13 | End: 2019-01-16

## 2019-01-13 RX ORDER — TRAZODONE HYDROCHLORIDE 50 MG/1
50 TABLET ORAL
Status: ON HOLD | COMMUNITY
End: 2019-01-24 | Stop reason: SDUPTHER

## 2019-01-13 RX ORDER — SODIUM CHLORIDE 0.9 % (FLUSH) 0.9 %
5-40 SYRINGE (ML) INJECTION EVERY 8 HOURS
Status: DISCONTINUED | OUTPATIENT
Start: 2019-01-13 | End: 2019-01-24 | Stop reason: HOSPADM

## 2019-01-13 RX ORDER — FERROUS SULFATE, DRIED 160(50) MG
1 TABLET, EXTENDED RELEASE ORAL DAILY
Status: DISCONTINUED | OUTPATIENT
Start: 2019-01-14 | End: 2019-01-24 | Stop reason: HOSPADM

## 2019-01-13 RX ORDER — CARVEDILOL 12.5 MG/1
25 TABLET ORAL 2 TIMES DAILY WITH MEALS
Status: DISCONTINUED | OUTPATIENT
Start: 2019-01-13 | End: 2019-01-24 | Stop reason: HOSPADM

## 2019-01-13 RX ORDER — LISINOPRIL 20 MG/1
40 TABLET ORAL DAILY
Status: DISCONTINUED | OUTPATIENT
Start: 2019-01-14 | End: 2019-01-24 | Stop reason: HOSPADM

## 2019-01-13 RX ORDER — HYDROCODONE BITARTRATE AND ACETAMINOPHEN 10; 325 MG/1; MG/1
2 TABLET ORAL
Status: DISCONTINUED | OUTPATIENT
Start: 2019-01-14 | End: 2019-01-13

## 2019-01-13 RX ORDER — HYDROCODONE BITARTRATE AND ACETAMINOPHEN 5; 325 MG/1; MG/1
2 TABLET ORAL
Status: DISCONTINUED | OUTPATIENT
Start: 2019-01-13 | End: 2019-01-24 | Stop reason: HOSPADM

## 2019-01-13 RX ORDER — CARVEDILOL 12.5 MG/1
12.5 TABLET ORAL 2 TIMES DAILY WITH MEALS
Status: DISCONTINUED | OUTPATIENT
Start: 2019-01-13 | End: 2019-01-13

## 2019-01-13 RX ORDER — DULOXETIN HYDROCHLORIDE 30 MG/1
60 CAPSULE, DELAYED RELEASE ORAL DAILY
Status: DISCONTINUED | OUTPATIENT
Start: 2019-01-14 | End: 2019-01-24 | Stop reason: HOSPADM

## 2019-01-13 RX ADMIN — Medication 10 ML: at 20:36

## 2019-01-13 RX ADMIN — SODIUM CHLORIDE 1.5 G: 900 INJECTION, SOLUTION INTRAVENOUS at 05:18

## 2019-01-13 RX ADMIN — METHADONE HYDROCHLORIDE 20 MG: 10 TABLET ORAL at 16:30

## 2019-01-13 RX ADMIN — HYDROCODONE BITARTRATE AND ACETAMINOPHEN 2 TABLET: 5; 325 TABLET ORAL at 15:26

## 2019-01-13 RX ADMIN — SODIUM CHLORIDE 1.5 G: 900 INJECTION, SOLUTION INTRAVENOUS at 18:08

## 2019-01-13 RX ADMIN — METHYLPHENIDATE HYDROCHLORIDE 10 MG: 5 TABLET ORAL at 15:26

## 2019-01-13 RX ADMIN — CARVEDILOL 25 MG: 12.5 TABLET, FILM COATED ORAL at 16:30

## 2019-01-13 RX ADMIN — SODIUM CHLORIDE 100 ML/HR: 900 INJECTION, SOLUTION INTRAVENOUS at 01:59

## 2019-01-13 RX ADMIN — Medication 10 ML: at 13:13

## 2019-01-13 RX ADMIN — PREGABALIN 225 MG: 75 CAPSULE ORAL at 09:50

## 2019-01-13 RX ADMIN — METHADONE HYDROCHLORIDE 20 MG: 10 TABLET ORAL at 20:35

## 2019-01-13 RX ADMIN — POLYETHYLENE GLYCOL 3350 17 G: 17 POWDER, FOR SOLUTION ORAL at 20:35

## 2019-01-13 RX ADMIN — SODIUM CHLORIDE 1.5 G: 900 INJECTION, SOLUTION INTRAVENOUS at 23:18

## 2019-01-13 RX ADMIN — HYDROCODONE BITARTRATE AND ACETAMINOPHEN 2 TABLET: 5; 325 TABLET ORAL at 20:35

## 2019-01-13 RX ADMIN — CARVEDILOL 12.5 MG: 12.5 TABLET, FILM COATED ORAL at 08:32

## 2019-01-13 RX ADMIN — Medication 10 ML: at 01:59

## 2019-01-13 RX ADMIN — Medication 10 ML: at 05:18

## 2019-01-13 RX ADMIN — PREGABALIN 225 MG: 75 CAPSULE ORAL at 18:08

## 2019-01-13 RX ADMIN — SODIUM CHLORIDE 100 ML/HR: 900 INJECTION, SOLUTION INTRAVENOUS at 20:53

## 2019-01-13 RX ADMIN — POLYETHYLENE GLYCOL 3350 17 G: 17 POWDER, FOR SOLUTION ORAL at 13:13

## 2019-01-13 RX ADMIN — HYDROCODONE BITARTRATE AND ACETAMINOPHEN 2 TABLET: 5; 325 TABLET ORAL at 10:18

## 2019-01-13 RX ADMIN — LORAZEPAM 1 MG: 1 TABLET ORAL at 18:08

## 2019-01-13 RX ADMIN — LORAZEPAM 1 MG: 1 TABLET ORAL at 10:18

## 2019-01-13 RX ADMIN — SODIUM CHLORIDE 1.5 G: 900 INJECTION, SOLUTION INTRAVENOUS at 12:35

## 2019-01-13 RX ADMIN — ENOXAPARIN SODIUM 40 MG: 40 INJECTION, SOLUTION INTRAVENOUS; SUBCUTANEOUS at 08:32

## 2019-01-13 NOTE — ED NOTES
Pt given socks and bag meal. Pt becoming more alert after initiating fluids and food. Pt updated on plan for possible admission.

## 2019-01-13 NOTE — PROGRESS NOTES
Problem: Pressure Injury - Risk of  Goal: *Prevention of pressure injury  Document Romel Scale and appropriate interventions in the flowsheet. Outcome: Progressing Towards Goal  Pressure Injury Interventions:  Sensory Interventions: Assess changes in LOC    Moisture Interventions: Absorbent underpads    Activity Interventions: Increase time out of bed    Mobility Interventions: HOB 30 degrees or less    Nutrition Interventions: Document food/fluid/supplement intake    Friction and Shear Interventions: HOB 30 degrees or less               Problem: Falls - Risk of  Goal: *Absence of Falls  Document Luther Fall Risk and appropriate interventions in the flowsheet.   Outcome: Progressing Towards Goal  Fall Risk Interventions:  Mobility Interventions: Assess mobility with egress test         Medication Interventions: Evaluate medications/consider consulting pharmacy, Patient to call before getting OOB    Elimination Interventions: Call light in reach    History of Falls Interventions: Consult care management for discharge planning, Door open when patient unattended, Evaluate medications/consider consulting pharmacy, Investigate reason for fall

## 2019-01-13 NOTE — ED NOTES
Emergency Department Nursing Plan of Care       The Nursing Plan of Care is developed from the Nursing assessment and Emergency Department Attending provider initial evaluation. The plan of care may be reviewed in the ED Provider note.     The Plan of Care was developed with the following considerations:   Patient / Family readiness to learn indicated by:verbalized understanding  Persons(s) to be included in education: patient  Barriers to Learning/Limitations:No    Signed     Hien Villa RN    1/12/2019   11:22 PM

## 2019-01-13 NOTE — PROGRESS NOTES
TRANSFER - IN REPORT:    Verbal report received from [facility] RN(name) on [employer]  being received from ED(unit) for routine progression of care      Report consisted of patients Situation, Background, Assessment and   Recommendations(SBAR). Information from the following report(s) SBAR, Kardex, ED Summary, Intake/Output, MAR and Recent Results was reviewed with the receiving nurse. Opportunity for questions and clarification was provided. Assessment completed upon patients arrival to unit and care assumed. 2256: Admission: Pt arrived on floor via stretcher from the ED with no family at bedside. Pt is alert and oriented and vitals stable on room air. Pain meds given in the ED, pt denies pain when he is not moving. Cellphone, wallet and keys left at bedside with pt. No items sent to safe. Personal medication/narcotics counted with charge nurse and locked in pt bin until pharmacy comes. Inventory counted and verified:  Lorazepam : 17  Methadone: 11  Norco 10325: 32  Tele MD contacted to see pt in his room. Will cont to monitor.

## 2019-01-13 NOTE — H&P
Hospitalist Admission Note    NAME: Lex Monreal   :  1950   MRN:  [id number]     Date/Time:  2019 2:24 AM    Patient PCP: Riana Calhoun MD  ______________________________________________________________________  Given the patient's current clinical presentation, I have a high level of concern for decompensation if discharged from the emergency department. Complex decision making was performed, which includes reviewing the patient's available past medical records, laboratory results, and x-ray films. My assessment of this patient's clinical condition and my plan of care is as follows. Assessment / Plan:    Cellulitis, mild POA  on Left lateral calf , no obvious collection around  -started Unasyn, can be de escalated to po antibiotics within 24 hours  -wound care consult    Rhabdomyolysis POA  From falls  -CK 1324  -on IVF, trend CK and monitor renal function    Chronic neck and low back pain, POA  Severe/multilevel cervical/Lumbar spinal canal stenosis. On multiple pain medications, sedating medications, will consult pharmacist to review and reconcile home medications        Chronic debility from neck/back stenosis and neuropathy  -PT/OT consult      Anxiety and depression  Code Status: full   Surrogate Decision Maker: ex wife Carmelo Alex and children ( none of them lives locally)  DVT Prophylaxis: lovenox  GI Prophylaxis: not indicated  Baseline: lives by himself, walks with walker        Subjective:   CHIEF COMPLAINT: falls    HISTORY OF PRESENT ILLNESS:     Lex Monreal is a 77 y.o. male with PMHx significant for HTN, chronic spinal stenosis with chronic pain, on multiple pain medications, and constipation, presented to ER via EMS with c/o multiple complaints. Says he has has chronic numbness and weakness in his legs off and on but has more frequent falls in last couple of days, says he fell almost 11-12 times in last 2 days.  Denies LOC but admits hitting head multiple times. He lives by himself and uses a cane but has been unable to walk or the past 2 days. Says he uses hydrocodone and methadone for his chronic pain and that has not been relieving his pain. Denies recent fever, cough, CP, SOB, nausea, diarrhea, bowel or bladder incontinence. In ER he was afebrile, blood work shows mild leukocytosis. We were asked to admit for work up and evaluation of the above problems. Past Medical History:   Diagnosis Date    Anemia NEC     Asthma     Chronic pain     Due to spinal stenosis    Constipation     HTN (hypertension)     Hypertension     Hypogonadism male     Other ill-defined conditions(799.89)     spinal stenosis    Psychiatric disorder     major depressive disorder    Sleep trouble     Spinal stenosis     Cervical and Lumbar    Vertebral compression fracture (HCC)         Past Surgical History:   Procedure Laterality Date    HX BACK SURGERY      HX CATARACT REMOVAL Bilateral     HX CERVICAL LAMINECTOMY      HX TONSILLECTOMY         Social History     Tobacco Use    Smoking status: Never Smoker    Smokeless tobacco: Never Used   Substance Use Topics    Alcohol use: No     Comment: Drank six weeks ago. Family History   Problem Relation Age of Onset    Heart Disease Father     Hypertension Father     Stroke Father      Allergies   Allergen Reactions    Amitriptyline Other (comments)     hallucinating    Neurontin [Gabapentin] Other (comments)     insomnia    Percocet [Oxycodone-Acetaminophen] Nausea Only and Anxiety     Pt states he can tolerate small doses at 10 mg a day        Prior to Admission medications    Medication Sig Start Date End Date Taking? Authorizing Provider   carvedilol (COREG) 25 mg tablet TAKE ONE TABLET BY MOUTH TWICE A DAY WITH MEALS 11/13/18   Clifton Morocho MD   zolpidem (AMBIEN) 10 mg tablet Take  by mouth nightly as needed for Sleep. Provider, Historical   ONDANSETRON PO Take  by mouth.     Provider, Historical   testosterone cypionate (DEPOTESTOTERONE CYPIONATE) 200 mg/mL injection INJECT 0.5 MILLILITERS ONCE EVERY 7 DAYS 6/28/18   Griselda Gordon MD   Syringe with Needle, Disp, (BD LUER-HANG SYRINGE) 3 mL 21 gauge x 1\" syrg USE AS DIRECTED every 7 days. 6/28/18   Griselda Gordon MD   naproxen sodium (ALEVE) 220 mg cap Take  by mouth. Provider, Historical   buPROPion XL (WELLBUTRIN XL) 300 mg XL tablet  1/23/17   Provider, Historical   VOLTAREN 1 % gel  1/21/17   Provider, Historical   methylphenidate (RITALIN) 10 mg tablet 3 tabs daily 12/4/15   Provider, Historical   LYRICA 225 mg capsule 225 mg two (2) times a day. 12/16/15   Provider, Historical   tiZANidine (ZANAFLEX) 2 mg tablet  10/23/15   Provider, Historical   CALCIUM 600 WITH VITAMIN D3 600 mg(1,500mg) -400 unit chew  12/16/15   Provider, Historical   methadone (DOLOPHINE) 10 mg tablet Take 10 mg by mouth two (2) times a day. Provider, Historical   HYDROcodone-acetaminophen (NORCO)  mg tablet Take  by mouth. 5 tabs daily    Provider, Historical   lisinopril (PRINIVIL, ZESTRIL) 40 mg tablet Take 1 tablet by mouth daily. 9/11/14   Griselda Gordon MD   polyethylene glycol [facility]) 17 gram/dose powder  8/24/14   Provider, Historical   LORazepam (ATIVAN) 1 mg tablet Take 1 Tab by mouth every eight (8) hours as needed. 6/29/14   Khoa Ashton MD   omeprazole (PRILOSEC) 20 mg capsule TAKE ONE CAPSULE BY MOUTH EVERY DAY 5/27/13   Romero Cartagena MD   DULoxetine (CYMBALTA) 60 mg capsule Take 120 mg by mouth daily. Provider, Historical       REVIEW OF SYSTEMS:     I am not able to complete the review of systems because:    The patient is intubated and sedated    The patient has altered mental status due to his acute medical problems    The patient has baseline aphasia from prior stroke(s)    The patient has baseline dementia and is not reliable historian    The patient is in acute medical distress and unable to provide information Total of 12 systems reviewed as follows:       POSITIVE= underlined text  Negative = text not underlined  General:  fever, chills, sweats, generalized weakness, weight loss/gain,      loss of appetite   Eyes:    blurred vision, eye pain, loss of vision, double vision  ENT:    rhinorrhea, pharyngitis   Respiratory:   cough, sputum production, SOB, BRYAN, wheezing, pleuritic pain   Cardiology:   chest pain, palpitations, orthopnea, PND, edema, syncope   Gastrointestinal:  abdominal pain , N/V, diarrhea, dysphagia, constipation, bleeding   Genitourinary:  frequency, urgency, dysuria, hematuria, incontinence   Muskuloskeletal :  arthralgia, myalgia, back pain  Hematology:  easy bruising, nose or gum bleeding, lymphadenopathy   Dermatological: rash, ulceration, pruritis, color change / jaundice  Endocrine:   hot flashes or polydipsia   Neurological:  headache, dizziness, confusion, focal weakness, paresthesia,     Speech difficulties, memory loss, gait difficulty  Psychological: Feelings of anxiety, depression, agitation    Objective:   VITALS:    Visit Vitals  /63 (BP 1 Location: Left arm, BP Patient Position: At rest)   Pulse 81   Temp 97.7 °F (36.5 °C)   Resp 18   Ht 6' 7\" (2.007 m)   Wt 99.8 kg (220 lb)   SpO2 98%   BMI 24.78 kg/m²       PHYSICAL EXAM: examined with assistance of patient's nurse    General:    Alert, cooperative, no distress, appears stated age. HEENT: Atraumatic, poor dentition  Chest wall:  No tenderness  No Accessory muscle use. Heart:   Regular  rhythm,  No  murmur   No edema  Abdomen:   Soft, non-tender. Not distended. Bowel sounds normal  Extremities: Multiple abrasions on upper extremities and no lower extremities. Left upper thigh, scabbed wound with minimal erythema around, skin break gluteal area, left buttock   Skin:     As above  Psych:  Good insight. Not depressed. Not anxious or agitated. Neurologic: No facial asymmetry. No aphasia or slurred speech.  Symmetrical strength  _______________________________________________________________________  Care Plan discussed with:    Comments   Patient X    Family      RN X    Care Manager                    Consultant:      _______________________________________________________________________  Expected  Disposition:   Home with Family X   HH/PT/OT/RN    SNF/LTC    KT    ________________________________________________________________________  TOTAL TIME:  39 Minutes    Critical Care Provided     Minutes non procedure based      Comments    x Reviewed previous records   >50% of visit spent in counseling and coordination of care x Discussion with patient and/or family and questions answered       ________________________________________________________________________  Signed: Earline Cunningham MD    Procedures: see electronic medical records for all procedures/Xrays and details which were not copied into this note but were reviewed prior to creation of Plan. LAB DATA REVIEWED:    Recent Results (from the past 24 hour(s))   CBC WITH AUTOMATED DIFF    Collection Time: 01/12/19 10:35 PM   Result Value Ref Range    WBC 13.4 (H) 4.1 - 11.1 K/uL    RBC 3.84 (L) 4.10 - 5.70 M/uL    HGB 11.6 (L) 12.1 - 17.0 g/dL    HCT 36.0 (L) 36.6 - 50.3 %    MCV 93.8 80.0 - 99.0 FL    MCH 30.2 26.0 - 34.0 PG    MCHC 32.2 30.0 - 36.5 g/dL    RDW 13.8 11.5 - 14.5 %    PLATELET 875 385 - 295 K/uL    MPV 11.0 8.9 - 12.9 FL    NRBC 0.0 0  WBC    ABSOLUTE NRBC 0.00 0.00 - 0.01 K/uL    NEUTROPHILS 84 (H) 32 - 75 %    LYMPHOCYTES 5 (L) 12 - 49 %    MONOCYTES 10 5 - 13 %    EOSINOPHILS 0 0 - 7 %    BASOPHILS 0 0 - 1 %    IMMATURE GRANULOCYTES 1 (H) 0.0 - 0.5 %    ABS. NEUTROPHILS 11.3 (H) 1.8 - 8.0 K/UL    ABS. LYMPHOCYTES 0.7 (L) 0.8 - 3.5 K/UL    ABS. MONOCYTES 1.3 (H) 0.0 - 1.0 K/UL    ABS. EOSINOPHILS 0.0 0.0 - 0.4 K/UL    ABS. BASOPHILS 0.0 0.0 - 0.1 K/UL    ABS. IMM.  GRANS. 0.1 (H) 0.00 - 0.04 K/UL    DF SMEAR SCANNED      RBC COMMENTS NORMOCYTIC, NORMOCHROMIC      WBC COMMENTS TOXIC GRANULATION     METABOLIC PANEL, COMPREHENSIVE    Collection Time: 01/12/19 10:35 PM   Result Value Ref Range    Sodium 140 136 - 145 mmol/L    Potassium 4.0 3.5 - 5.1 mmol/L    Chloride 103 97 - 108 mmol/L    CO2 27 21 - 32 mmol/L    Anion gap 10 5 - 15 mmol/L    Glucose 120 (H) 65 - 100 mg/dL    BUN 36 (H) 6 - 20 MG/DL    Creatinine 1.25 0.70 - 1.30 MG/DL    BUN/Creatinine ratio 29 (H) 12 - 20      GFR est AA >60 >60 ml/min/1.73m2    GFR est non-AA 57 (L) >60 ml/min/1.73m2    Calcium 8.9 8.5 - 10.1 MG/DL    Bilirubin, total 1.4 (H) 0.2 - 1.0 MG/DL    ALT (SGPT) 31 12 - 78 U/L    AST (SGOT) 61 (H) 15 - 37 U/L    Alk.  phosphatase 86 45 - 117 U/L    Protein, total 6.1 (L) 6.4 - 8.2 g/dL    Albumin 2.7 (L) 3.5 - 5.0 g/dL    Globulin 3.4 2.0 - 4.0 g/dL    A-G Ratio 0.8 (L) 1.1 - 2.2     CK    Collection Time: 01/12/19 10:35 PM   Result Value Ref Range    CK 1,324 (H) 39 - 308 U/L

## 2019-01-13 NOTE — PROGRESS NOTES
Reason for Admission:   Per Dr. Doug Pennington H&P: Alfred.[age] y.o. male with PMHx significant for HTN, chronic spinal stenosis with chronic pain, on multiple pain medications, and constipation, presented to ER via EMS with c/o multiple complaints. Says he has chronic numbness and weakness in his legs off and on but has more frequent falls in last couple of days, says he fell almost 11-12 times in last 2 days. Denies LOC but admits hitting head multiple times. He lives by himself and uses a cane but has been unable to walk or the past 2 days. Says he uses hydrocodone and methadone for his chronic pain and that has not been relieving his pain.  Pt primary Dx is Cellulitis [of left lateral calf]. RRAT Score:     12                Plan for utilizing home health:    TBD - CM to meet with pt to discuss hx of home health services - pt reported he lives by himself and uses a cane but has been unable to walk or the past 2 days. Likelihood of Readmission: Moderate due to presenting multiple complaints. Pt chart documents: ED/IP Visits last 6 months = 0 ED and 0 IP                         Transition of Care Plan:       CM reviewed pt chart. CM to meet with pt to complete Initial Patient Assessment. According to pt chart, he lives alone and has children who live outside of [facility]. Pt reportedly uses a cane to ambulate however reported multiple falls in last 2 days. MD to order PT/OT consult during admission. Dr. Doug Pennington also documented her plan to order wound consult due to primary dx Cellulitis. Pt was admitted under Observation Status. Pt has medical insurance coverage through [facility] [employer]. The Patient Guide to Observation & Outpatient Care (OBS) Letter will be reviewed with pt and the signed pt document will be placed in his chart. CM will meet with the medical team to assist with discharge planning.

## 2019-01-13 NOTE — ED NOTES
TRANSFER - OUT REPORT:    Verbal report given to EVELIN Tejada(name) on [facility]  being transferred to Med Surg(unit) for routine progression of care       Report consisted of patients Situation, Background, Assessment and   Recommendations(SBAR). Information from the following report(s) SBAR and ED Summary was reviewed with the receiving nurse. Lines:   Peripheral IV 01/12/19 Right Forearm (Active)   Site Assessment Clean, dry, & intact 1/12/2019 11:21 PM   Phlebitis Assessment 0 1/12/2019 11:21 PM   Infiltration Assessment 0 1/12/2019 11:21 PM   Dressing Status Clean, dry, & intact 1/12/2019 11:21 PM   Dressing Type Transparent 1/12/2019 11:21 PM   Hub Color/Line Status Flushed 1/12/2019 11:21 PM        Opportunity for questions and clarification was provided.       Patient transported with:   Registered Nurse

## 2019-01-13 NOTE — ED PROVIDER NOTES
EMERGENCY DEPARTMENT HISTORY AND PHYSICAL EXAM      Date: 1/12/2019  Patient Name: George Castillo    History of Presenting Illness     Chief Complaint   Patient presents with    Extremity Weakness     History Provided By: Patient    HPI: George Castillo, 77 y.o. male with PMHx significant for HTN, anemia, spinal stenosis w/ chronic pain, and constipation, presents via EMS to the ED with cc of persistent BLE weakness starting a \"while ago\" alongside chronic back pain. He reports that his legs have been \"wobbly\" and has fallen 11 times in the last 2 days. He denies LOC but states that he hit his head multiple times. He reports using a cane at baseline. This prompted him to call EMS due to past episodes of increased pain/weakness only lasting 1 day at a time and then improving. He reports experiencing back pain when standing for an undefined amount of time. He notes intermittent numbness in his b/l LE chronically with no changes in the past two days. He states that he takes 5 of  mg Hydrocodone and 2 of 10 mg Methadone day, but denies relief of his current sx. He denies missing a dose of his pain medications. He also reports that he has been constipated recently due to him not taking his constipation medications. He denies being around sick individuals recently. The pt specifically denies experiencing neck pain, bowel or bladder incontinence, saddle anesthesia, abdominal pain, fever, cough, chills, SOB, HA, CP, or N/V/D. Patient also reported to nursing that he noticed a knot with surrounding redness on his LLE and took a razor blade, heated it up, and then cut into the area to \"try to drain it. \"     PMHx: HTN, anemia, spinal stenosis w/ chronic pain, and constipation  PSHx: cervical laminectomy  SHx: - EtOH, - tobacco, - illicit drugs    There are no other complaints, changes, or physical findings at this time.     PCP: Yandy Hernandez MD    Current Facility-Administered Medications   Medication Dose Route Frequency Provider Last Rate Last Dose    sodium chloride (NS) flush 5-40 mL  5-40 mL IntraVENous Q8H Lianne Rodriguez MD        sodium chloride (NS) flush 5-40 mL  5-40 mL IntraVENous PRN Lianne Rodriguez MD        enoxaparin (LOVENOX) injection 40 mg  40 mg SubCUTAneous Q24H Lianne Rodriguez MD        0.9% sodium chloride infusion  100 mL/hr IntraVENous CONTINUOUS Lianne Rodriguez MD        acetaminophen (TYLENOL) tablet 650 mg  650 mg Oral Q6H PRN Lianne Rodriguez MD        ondansetron [facility]) injection 4 mg  4 mg IntraVENous Q6H PRN Lianne Rodriguez MD        ampicillin-sulbactam (UNASYN) 1.5 g in 0.9% sodium chloride (MBP/ADV) 50 mL  1.5 g IntraVENous Q6H Lianne Rodriguez MD         Current Outpatient Medications   Medication Sig Dispense Refill    carvedilol (COREG) 25 mg tablet TAKE ONE TABLET BY MOUTH TWICE A DAY WITH MEALS 180 Tab 2    zolpidem (AMBIEN) 10 mg tablet Take  by mouth nightly as needed for Sleep.  ONDANSETRON PO Take  by mouth.  testosterone cypionate (DEPOTESTOTERONE CYPIONATE) 200 mg/mL injection INJECT 0.5 MILLILITERS ONCE EVERY 7 DAYS 2 mL 5    Syringe with Needle, Disp, (BD LUER-HANG SYRINGE) 3 mL 21 gauge x 1\" syrg USE AS DIRECTED every 7 days. 12 Syringe 3    naproxen sodium (ALEVE) 220 mg cap Take  by mouth.  buPROPion XL (WELLBUTRIN XL) 300 mg XL tablet       VOLTAREN 1 % gel       methylphenidate (RITALIN) 10 mg tablet 3 tabs daily      LYRICA 225 mg capsule 225 mg two (2) times a day.  tiZANidine (ZANAFLEX) 2 mg tablet       CALCIUM 600 WITH VITAMIN D3 600 mg(1,500mg) -400 unit chew       methadone (DOLOPHINE) 10 mg tablet Take 10 mg by mouth two (2) times a day.  HYDROcodone-acetaminophen (NORCO)  mg tablet Take  by mouth. 5 tabs daily      lisinopril (PRINIVIL, ZESTRIL) 40 mg tablet Take 1 tablet by mouth daily.  30 tablet 10    polyethylene glycol (MIRALAX) 17 gram/dose powder       LORazepam (ATIVAN) 1 mg tablet Take 1 Tab by mouth every eight (8) hours as needed. 5 Tab 0    omeprazole (PRILOSEC) 20 mg capsule TAKE ONE CAPSULE BY MOUTH EVERY DAY 30 Cap 1    DULoxetine (CYMBALTA) 60 mg capsule Take 120 mg by mouth daily. Past History     Past Medical History:  Past Medical History:   Diagnosis Date    Anemia NEC     Asthma     Chronic pain     Due to spinal stenosis    Constipation     HTN (hypertension)     Hypertension     Hypogonadism male     Other ill-defined conditions(799.89)     spinal stenosis    Psychiatric disorder     major depressive disorder    Sleep trouble     Spinal stenosis     Cervical and Lumbar    Vertebral compression fracture (HCC)        Past Surgical History:  Past Surgical History:   Procedure Laterality Date    HX BACK SURGERY      HX CATARACT REMOVAL Bilateral     HX CERVICAL LAMINECTOMY      HX TONSILLECTOMY         Family History:  Family History   Problem Relation Age of Onset    Heart Disease Father     Hypertension Father     Stroke Father        Social History:  Social History     Tobacco Use    Smoking status: Never Smoker    Smokeless tobacco: Never Used   Substance Use Topics    Alcohol use: No     Comment: Drank six weeks ago.  Drug use: No       Allergies: Allergies   Allergen Reactions    Amitriptyline Other (comments)     hallucinating    Neurontin [Gabapentin] Other (comments)     insomnia    Percocet [Oxycodone-Acetaminophen] Nausea Only and Anxiety     Pt states he can tolerate small doses at 10 mg a day         Review of Systems   Review of Systems   Constitutional: Negative for chills and fever. HENT: Negative for congestion, rhinorrhea and sore throat. Respiratory: Negative for cough and shortness of breath. Cardiovascular: Negative for chest pain. Gastrointestinal: Positive for constipation. Negative for abdominal pain, diarrhea, nausea and vomiting.        -incontinence   Genitourinary: Negative for dysuria, enuresis and urgency.    Musculoskeletal: Positive for back pain. Negative for neck pain. Skin: Negative for rash. Neurological: Positive for weakness and numbness. Negative for dizziness, light-headedness and headaches. -LOC   All other systems reviewed and are negative. Physical Exam   Physical Exam   Constitutional: He is oriented to person, place, and time. He appears well-developed and well-nourished. No distress. HENT:   Head: Normocephalic and atraumatic. Eyes: Conjunctivae and EOM are normal. Pupils are equal, round, and reactive to light. Neck: Normal range of motion. Cardiovascular: Normal rate, regular rhythm and intact distal pulses. Pulmonary/Chest: Effort normal and breath sounds normal. No stridor. No respiratory distress. Abdominal: Soft. He exhibits no distension. There is no tenderness. Musculoskeletal: Normal range of motion. Cervical back: He exhibits no tenderness. Thoracic back: He exhibits no tenderness. Lumbar back: He exhibits no tenderness. No midline back tenderness. Neurological: He is alert and oriented to person, place, and time. 4/5 strength in BLE with intact sensation   Skin: Skin is warm and dry. Abrasion and rash noted. There is erythema. Multiple abrasions on BUE (from falls per pt)  [id number] area of cellulitis on L posterior thigh with central area where patient cut himself  Stage 2 pressure ulcer on L butt that is 2x3 cm. Psychiatric: He has a normal mood and affect. Nursing note and vitals reviewed.       Diagnostic Study Results     Labs -     Recent Results (from the past 12 hour(s))   CBC WITH AUTOMATED DIFF    Collection Time: 01/12/19 10:35 PM   Result Value Ref Range    WBC 13.4 (H) 4.1 - 11.1 K/uL    RBC 3.84 (L) 4.10 - 5.70 M/uL    HGB 11.6 (L) 12.1 - 17.0 g/dL    HCT 36.0 (L) 36.6 - 50.3 %    MCV 93.8 80.0 - 99.0 FL    MCH 30.2 26.0 - 34.0 PG    MCHC 32.2 30.0 - 36.5 g/dL    RDW 13.8 11.5 - 14.5 %    PLATELET 835 837 - 044 K/uL    MPV 11.0 8.9 - 12.9 FL NRBC 0.0 0  WBC    ABSOLUTE NRBC 0.00 0.00 - 0.01 K/uL    NEUTROPHILS 84 (H) 32 - 75 %    LYMPHOCYTES 5 (L) 12 - 49 %    MONOCYTES 10 5 - 13 %    EOSINOPHILS 0 0 - 7 %    BASOPHILS 0 0 - 1 %    IMMATURE GRANULOCYTES 1 (H) 0.0 - 0.5 %    ABS. NEUTROPHILS 11.3 (H) 1.8 - 8.0 K/UL    ABS. LYMPHOCYTES 0.7 (L) 0.8 - 3.5 K/UL    ABS. MONOCYTES 1.3 (H) 0.0 - 1.0 K/UL    ABS. EOSINOPHILS 0.0 0.0 - 0.4 K/UL    ABS. BASOPHILS 0.0 0.0 - 0.1 K/UL    ABS. IMM. GRANS. 0.1 (H) 0.00 - 0.04 K/UL    DF SMEAR SCANNED      RBC COMMENTS NORMOCYTIC, NORMOCHROMIC      WBC COMMENTS TOXIC GRANULATION     METABOLIC PANEL, COMPREHENSIVE    Collection Time: 01/12/19 10:35 PM   Result Value Ref Range    Sodium 140 136 - 145 mmol/L    Potassium 4.0 3.5 - 5.1 mmol/L    Chloride 103 97 - 108 mmol/L    CO2 27 21 - 32 mmol/L    Anion gap 10 5 - 15 mmol/L    Glucose 120 (H) 65 - 100 mg/dL    BUN 36 (H) 6 - 20 MG/DL    Creatinine 1.25 0.70 - 1.30 MG/DL    BUN/Creatinine ratio 29 (H) 12 - 20      GFR est AA >60 >60 ml/min/1.73m2    GFR est non-AA 57 (L) >60 ml/min/1.73m2    Calcium 8.9 8.5 - 10.1 MG/DL    Bilirubin, total 1.4 (H) 0.2 - 1.0 MG/DL    ALT (SGPT) 31 12 - 78 U/L    AST (SGOT) 61 (H) 15 - 37 U/L    Alk. phosphatase 86 45 - 117 U/L    Protein, total 6.1 (L) 6.4 - 8.2 g/dL    Albumin 2.7 (L) 3.5 - 5.0 g/dL    Globulin 3.4 2.0 - 4.0 g/dL    A-G Ratio 0.8 (L) 1.1 - 2.2         Radiologic Studies -  CT Results  (Last 48 hours)               01/12/19 2340  CT HEAD WO CONT Final result    Impression:  IMPRESSION:       No acute process. Narrative:  INDICATION:   multiple falls       EXAM:  HEAD CT WITHOUT CONTRAST       COMPARISON:  January 17, 2015       TECHNIQUE:  Routine noncontrast axial head CT was performed. Sagittal and   coronal reconstructions were generated.          CT dose reduction was achieved through use of a standardized protocol tailored   for this examination and automatic exposure control for dose modulation. FINDINGS:       Ventricles:  Midline, no hydrocephalus. Intracranial Hemorrhage:  None. Brain Parenchyma/Brainstem:  Normal for age. Basal Cisterns:  Normal.    Paranasal Sinuses:  Visualized sinuses are clear. Additional Comments:  N/A. Medical Decision Making   I am the first provider for this patient. I reviewed the vital signs, available nursing notes, past medical history, past surgical history, family history and social history. Vital Signs-Reviewed the patient's vital signs. Patient Vitals for the past 12 hrs:   Temp Pulse Resp BP SpO2   01/13/19 0100 -- -- -- 100/43 93 %   01/13/19 0046 -- -- -- 120/52 98 %   01/13/19 0036 -- -- -- (!) 126/102 94 %   01/13/19 0017 -- -- -- -- 99 %   01/12/19 2159 98.4 °F (36.9 °C) 87 18 123/55 100 %     Records Reviewed: Nursing Notes and Old Medical Records    Provider Notes (Medical Decision Making):   DDx: electrolyte imbalance, anemia, chronic back pain, cellulitis, medication overdose, will r/o SDH with head CT given frequent falls. Pt with no saddle anesthesia, no bowel or bladder incontinence, doubt cauda equina. No fevers, no midline back pain, doubt spinal epidural abscess. On exam, pt with equal strength and sensation in b/l LE. Do not think he needs an emergent MRI at this time. Suspect some of his sx may be related to the multiple narcotics and mood altering medications the patient is on at home. Will check basic labs, UA. Given the repeated falls and lack of any assistance at home as well, anticipate he will likely require admission. ED Course:   Initial assessment performed. The patients presenting problems have been discussed, and they are in agreement with the care plan formulated and outlined with them. I have encouraged them to ask questions as they arise throughout their visit. Labs with mild Cr elevated. Elevated WBC with toxic granulations. CT head with no acute findings.  Pt given ceftriaxone for cellulitis. Will d/w hospitalist for admission. CONSULT NOTE:   12:20 AM  Adonis Rivers MD spoke with Dr. Robinson Travis,   Specialty: Hospitalist  Discussed pt's hx, disposition, and available diagnostic and imaging results. Reviewed care plans. Consultant will evaluate pt for admission. Written by Vanita Haji ED Scribe, as dictated by Adonis Stallworth MD.    Critical Care Time: 0 minutes    Disposition:  Admit  12:20 AM  Patient is being admitted to the hospital by Dr. Robinson Travis. The results of their tests and reasons for their admission have been discussed with them and/or available family. They convey agreement and understanding for the need to be admitted and for their admission diagnosis. Consultation has been made with the inpatient physician specialist for hospitalization. Written by Vanita Haji ED Scribe, as dictated by Adonis Stallworth MD.    PLAN:  1. Admitted to the hospital    Diagnosis     Clinical Impression:   1. Cellulitis of left lower extremity    2. Dehydration    3. Frequent falls        Attestation: This note is prepared by Vanita Haji, acting as Scribe for Adonis Stallworth MD.    Adonis Stallworth MD: The scribe's documentation has been prepared under my direction and personally reviewed by me in its entirety. I confirm that the note above accurately reflects all work, treatment, procedures, and medical decision making performed by me.

## 2019-01-13 NOTE — ED NOTES
Pt presents to ED via EMS for chief complaint of bilateral leg pain, numbness, and falls. Pt reports hx of chronic pain that he takes multiple sedating medications for. Pt reports prescriptions have not been helping lately. Pt reports weakness and frequent falls over the past 2 days. Pt reports he has fallen approx 11 times in the past 2 days. Pt presents with multiple abrasions all over his body, primary hs 4 extremities. Pt also presents with Stage II pressure wound to the left buttocks near the rectum. Yesterday pt reports that he took a razor blade, heated it up, and attempted to natalie an abscess to his left thigh \"to get the puss out. \"  Pt reports he usually needs a cane to ambulate. Pt states \"I just need a rollator and a nurse to come to my home. \"  Pt lives alone and is his own primary care giver.

## 2019-01-14 ENCOUNTER — ANESTHESIA (OUTPATIENT)
Dept: SURGERY | Age: 69
End: 2019-01-14
Payer: MEDICARE

## 2019-01-14 ENCOUNTER — ANESTHESIA EVENT (OUTPATIENT)
Dept: SURGERY | Age: 69
End: 2019-01-14
Payer: MEDICARE

## 2019-01-14 ENCOUNTER — APPOINTMENT (OUTPATIENT)
Dept: ULTRASOUND IMAGING | Age: 69
End: 2019-01-14
Attending: NEUROMUSCULOSKELETAL MEDICINE & OMM
Payer: MEDICARE

## 2019-01-14 PROBLEM — L02.91 ABSCESS: Status: ACTIVE | Noted: 2019-01-14

## 2019-01-14 PROCEDURE — 96372 THER/PROPH/DIAG INJ SC/IM: CPT

## 2019-01-14 PROCEDURE — 77030018836 HC SOL IRR NACL ICUM -A: Performed by: SURGERY

## 2019-01-14 PROCEDURE — 74011250636 HC RX REV CODE- 250/636: Performed by: EMERGENCY MEDICINE

## 2019-01-14 PROCEDURE — 74011250637 HC RX REV CODE- 250/637: Performed by: PSYCHIATRY & NEUROLOGY

## 2019-01-14 PROCEDURE — 97116 GAIT TRAINING THERAPY: CPT | Performed by: PHYSICAL THERAPIST

## 2019-01-14 PROCEDURE — 77030020143 HC AIRWY LARYN INTUB CGAS -A: Performed by: ANESTHESIOLOGY

## 2019-01-14 PROCEDURE — 87076 CULTURE ANAEROBE IDENT EACH: CPT

## 2019-01-14 PROCEDURE — G0378 HOSPITAL OBSERVATION PER HR: HCPCS

## 2019-01-14 PROCEDURE — 96367 TX/PROPH/DG ADDL SEQ IV INF: CPT

## 2019-01-14 PROCEDURE — 74011250637 HC RX REV CODE- 250/637: Performed by: NEUROMUSCULOSKELETAL MEDICINE & OMM

## 2019-01-14 PROCEDURE — 76010000138 HC OR TIME 0.5 TO 1 HR: Performed by: SURGERY

## 2019-01-14 PROCEDURE — 74011250636 HC RX REV CODE- 250/636

## 2019-01-14 PROCEDURE — 99218 HC RM OBSERVATION: CPT

## 2019-01-14 PROCEDURE — 97161 PT EVAL LOW COMPLEX 20 MIN: CPT | Performed by: PHYSICAL THERAPIST

## 2019-01-14 PROCEDURE — 74011250636 HC RX REV CODE- 250/636: Performed by: HOSPITALIST

## 2019-01-14 PROCEDURE — 74011250636 HC RX REV CODE- 250/636: Performed by: NEUROMUSCULOSKELETAL MEDICINE & OMM

## 2019-01-14 PROCEDURE — 74011000258 HC RX REV CODE- 258: Performed by: EMERGENCY MEDICINE

## 2019-01-14 PROCEDURE — 74011000258 HC RX REV CODE- 258: Performed by: NEUROMUSCULOSKELETAL MEDICINE & OMM

## 2019-01-14 PROCEDURE — 87205 SMEAR GRAM STAIN: CPT

## 2019-01-14 PROCEDURE — 76210000063 HC OR PH I REC FIRST 0.5 HR: Performed by: SURGERY

## 2019-01-14 PROCEDURE — 77030011640 HC PAD GRND REM COVD -A: Performed by: SURGERY

## 2019-01-14 PROCEDURE — 76882 US LMTD JT/FCL EVL NVASC XTR: CPT

## 2019-01-14 PROCEDURE — 74011000250 HC RX REV CODE- 250: Performed by: SURGERY

## 2019-01-14 PROCEDURE — 96366 THER/PROPH/DIAG IV INF ADDON: CPT

## 2019-01-14 PROCEDURE — 76060000032 HC ANESTHESIA 0.5 TO 1 HR: Performed by: SURGERY

## 2019-01-14 PROCEDURE — 87075 CULTR BACTERIA EXCEPT BLOOD: CPT

## 2019-01-14 RX ORDER — SODIUM CHLORIDE, SODIUM LACTATE, POTASSIUM CHLORIDE, CALCIUM CHLORIDE 600; 310; 30; 20 MG/100ML; MG/100ML; MG/100ML; MG/100ML
INJECTION, SOLUTION INTRAVENOUS
Status: DISCONTINUED | OUTPATIENT
Start: 2019-01-14 | End: 2019-01-14 | Stop reason: HOSPADM

## 2019-01-14 RX ORDER — ONDANSETRON 2 MG/ML
INJECTION INTRAMUSCULAR; INTRAVENOUS AS NEEDED
Status: DISCONTINUED | OUTPATIENT
Start: 2019-01-14 | End: 2019-01-14 | Stop reason: HOSPADM

## 2019-01-14 RX ORDER — BUPIVACAINE HYDROCHLORIDE 2.5 MG/ML
30 INJECTION, SOLUTION EPIDURAL; INFILTRATION; INTRACAUDAL ONCE
Status: COMPLETED | OUTPATIENT
Start: 2019-01-14 | End: 2019-01-14

## 2019-01-14 RX ORDER — FENTANYL CITRATE 50 UG/ML
INJECTION, SOLUTION INTRAMUSCULAR; INTRAVENOUS AS NEEDED
Status: DISCONTINUED | OUTPATIENT
Start: 2019-01-14 | End: 2019-01-14 | Stop reason: HOSPADM

## 2019-01-14 RX ORDER — PROPOFOL 10 MG/ML
INJECTION, EMULSION INTRAVENOUS AS NEEDED
Status: DISCONTINUED | OUTPATIENT
Start: 2019-01-14 | End: 2019-01-14 | Stop reason: HOSPADM

## 2019-01-14 RX ORDER — BUSPIRONE HYDROCHLORIDE 5 MG/1
5 TABLET ORAL 2 TIMES DAILY
Status: DISCONTINUED | OUTPATIENT
Start: 2019-01-14 | End: 2019-01-24 | Stop reason: HOSPADM

## 2019-01-14 RX ORDER — LIDOCAINE HYDROCHLORIDE 20 MG/ML
INJECTION, SOLUTION EPIDURAL; INFILTRATION; INTRACAUDAL; PERINEURAL AS NEEDED
Status: DISCONTINUED | OUTPATIENT
Start: 2019-01-14 | End: 2019-01-14 | Stop reason: HOSPADM

## 2019-01-14 RX ORDER — MIDAZOLAM HYDROCHLORIDE 1 MG/ML
INJECTION, SOLUTION INTRAMUSCULAR; INTRAVENOUS AS NEEDED
Status: DISCONTINUED | OUTPATIENT
Start: 2019-01-14 | End: 2019-01-14 | Stop reason: HOSPADM

## 2019-01-14 RX ORDER — DEXAMETHASONE SODIUM PHOSPHATE 4 MG/ML
INJECTION, SOLUTION INTRA-ARTICULAR; INTRALESIONAL; INTRAMUSCULAR; INTRAVENOUS; SOFT TISSUE AS NEEDED
Status: DISCONTINUED | OUTPATIENT
Start: 2019-01-14 | End: 2019-01-14 | Stop reason: HOSPADM

## 2019-01-14 RX ORDER — SODIUM CHLORIDE 9 MG/ML
75 INJECTION, SOLUTION INTRAVENOUS CONTINUOUS
Status: DISCONTINUED | OUTPATIENT
Start: 2019-01-14 | End: 2019-01-15

## 2019-01-14 RX ORDER — HALOPERIDOL 5 MG/ML
5 INJECTION INTRAMUSCULAR
Status: DISCONTINUED | OUTPATIENT
Start: 2019-01-14 | End: 2019-01-24 | Stop reason: HOSPADM

## 2019-01-14 RX ADMIN — LORAZEPAM 1 MG: 1 TABLET ORAL at 04:24

## 2019-01-14 RX ADMIN — METHYLPHENIDATE HYDROCHLORIDE 10 MG: 5 TABLET ORAL at 09:39

## 2019-01-14 RX ADMIN — BUPROPION HYDROCHLORIDE 450 MG: 150 TABLET, EXTENDED RELEASE ORAL at 09:38

## 2019-01-14 RX ADMIN — ENOXAPARIN SODIUM 40 MG: 40 INJECTION, SOLUTION INTRAVENOUS; SUBCUTANEOUS at 09:40

## 2019-01-14 RX ADMIN — ZOLPIDEM TARTRATE 10 MG: 5 TABLET ORAL at 00:04

## 2019-01-14 RX ADMIN — MIDAZOLAM HYDROCHLORIDE 2 MG: 1 INJECTION, SOLUTION INTRAMUSCULAR; INTRAVENOUS at 20:12

## 2019-01-14 RX ADMIN — DEXAMETHASONE SODIUM PHOSPHATE 4 MG: 4 INJECTION, SOLUTION INTRA-ARTICULAR; INTRALESIONAL; INTRAMUSCULAR; INTRAVENOUS; SOFT TISSUE at 20:33

## 2019-01-14 RX ADMIN — FENTANYL CITRATE 100 MCG: 50 INJECTION, SOLUTION INTRAMUSCULAR; INTRAVENOUS at 20:26

## 2019-01-14 RX ADMIN — PREGABALIN 225 MG: 75 CAPSULE ORAL at 09:38

## 2019-01-14 RX ADMIN — VANCOMYCIN HYDROCHLORIDE 2000 MG: 10 INJECTION, POWDER, LYOPHILIZED, FOR SOLUTION INTRAVENOUS at 14:18

## 2019-01-14 RX ADMIN — Medication 10 ML: at 21:38

## 2019-01-14 RX ADMIN — TIZANIDINE 2 MG: 4 TABLET ORAL at 13:41

## 2019-01-14 RX ADMIN — PIPERACILLIN SODIUM AND TAZOBACTAM SODIUM 3.38 G: 3; .375 INJECTION, POWDER, LYOPHILIZED, FOR SOLUTION INTRAVENOUS at 21:38

## 2019-01-14 RX ADMIN — LIDOCAINE HYDROCHLORIDE 60 MG: 20 INJECTION, SOLUTION EPIDURAL; INFILTRATION; INTRACAUDAL; PERINEURAL at 20:26

## 2019-01-14 RX ADMIN — CARVEDILOL 25 MG: 12.5 TABLET, FILM COATED ORAL at 09:40

## 2019-01-14 RX ADMIN — Medication 10 ML: at 05:42

## 2019-01-14 RX ADMIN — BUSPIRONE HYDROCHLORIDE 5 MG: 5 TABLET ORAL at 13:27

## 2019-01-14 RX ADMIN — HYDROCODONE BITARTRATE AND ACETAMINOPHEN 2 TABLET: 5; 325 TABLET ORAL at 09:39

## 2019-01-14 RX ADMIN — PROPOFOL 150 MG: 10 INJECTION, EMULSION INTRAVENOUS at 20:26

## 2019-01-14 RX ADMIN — LORAZEPAM 1 MG: 1 TABLET ORAL at 13:41

## 2019-01-14 RX ADMIN — METHADONE HYDROCHLORIDE 20 MG: 10 TABLET ORAL at 21:47

## 2019-01-14 RX ADMIN — PIPERACILLIN SODIUM AND TAZOBACTAM SODIUM 3.38 G: 3; .375 INJECTION, POWDER, LYOPHILIZED, FOR SOLUTION INTRAVENOUS at 13:26

## 2019-01-14 RX ADMIN — CALCIUM CARBONATE-VITAMIN D TAB 500 MG-200 UNIT 1 TABLET: 500-200 TAB at 09:39

## 2019-01-14 RX ADMIN — SODIUM CHLORIDE, SODIUM LACTATE, POTASSIUM CHLORIDE, CALCIUM CHLORIDE: 600; 310; 30; 20 INJECTION, SOLUTION INTRAVENOUS at 20:00

## 2019-01-14 RX ADMIN — Medication 10 ML: at 13:28

## 2019-01-14 RX ADMIN — LISINOPRIL 40 MG: 20 TABLET ORAL at 09:39

## 2019-01-14 RX ADMIN — DULOXETINE HYDROCHLORIDE 60 MG: 30 CAPSULE, DELAYED RELEASE ORAL at 09:38

## 2019-01-14 RX ADMIN — ONDANSETRON 4 MG: 2 INJECTION INTRAMUSCULAR; INTRAVENOUS at 20:33

## 2019-01-14 RX ADMIN — SODIUM CHLORIDE 1.5 G: 900 INJECTION, SOLUTION INTRAVENOUS at 05:42

## 2019-01-14 NOTE — PROGRESS NOTES
Reason for Admission:  Cellulitis. 69.y.o male who presents with falls. Onset of symptoms was gradual with unchanged course for a number of years. The pain is located lateral left thigh. Patient describes the pain as continuous and rated as moderate. Pain has been associated with a wound approximately 3 cm. Patient denies sob. Symptoms are aggravated by falls. RRAT Score:   12               Do you (patient/family) have any concerns for transition/discharge:                   Plan for utilizing home health:   Not at this time. Following for continuous care. Likelihood of readmission:  High. Patient lives alone walks with a walker. Transition of Care Plan: Patient in OBS will need OBS and MOONS  Notification. Psychiatry consult and General surgery consult. CM to confirm patient living arrangement. Possible referral to [facility] health .     [address] [place] [address]  [phone]

## 2019-01-14 NOTE — PROGRESS NOTES
Physical therapy:    11:30AM: Patient unavailable secondary to psych evaluation. 12:03PM: Patient unavailable secondary to with volunteers from spiritual care services. Will attempt as able.      Josh Boone PT

## 2019-01-14 NOTE — PROGRESS NOTES
[facility] Dosing Services: Antimicrobial Stewardship Progress Note  Consult for antibiotic dosing of vancomycin by Dr. Camarillo Marrow  Indication: SSTI with abscess  Day of Therapy: 1    Plan:  Start loading dose of vancomycin 2000 mg IV x 1. Follow with maintenance dose of vancomycin 1250 mg IV every 12 hours. Dose calculated to approximate a therapeutic trough of 15-20 (~15) mcg/mL. Plan for level: Prior to 4th dose (not ordered)  Pharmacy to follow daily and will make changes to dose and/or frequency based on clinical status. Other Antimicrobial  (not dosed by pharmacist)   Piperacillin/tazobactam - Day 1 of 5   Cultures     None   Serum Creatinine     Lab Results   Component Value Date/Time    Creatinine 1.04 01/13/2019 05:17 AM    Creatinine (POC) 0.8 04/28/2017 12:48 PM       Creatinine Clearance Estimated Creatinine Clearance: 90.1 mL/min (based on SCr of 1.04 mg/dL).      Temp   99.5 °F (37.5 °C)    WBC   Lab Results   Component Value Date/Time    WBC 13.7 (H) 01/13/2019 05:17 AM       H/H   Lab Results   Component Value Date/Time    HGB 10.7 (L) 01/13/2019 05:17 AM        Platelets   Lab Results   Component Value Date/Time    PLATELET 664 26/60/2602 05:17 AM        Thank you,  Ezra Acosta, PharmD, BCPS  [phone]

## 2019-01-14 NOTE — PERIOP NOTES
Spoke with Evita Sotomayor RN regarding patient on OR schedule for tonight at 2000 and to keep patient NPO

## 2019-01-14 NOTE — PROGRESS NOTES
Hospitalist Progress Note    Subjective:   Daily Progress Note: 2019 10:03 AM    Reports worsenign left thigh pain. Low grade fever. Current Facility-Administered Medications   Medication Dose Route Frequency    haloperidol lactate (HALDOL) injection 5 mg  5 mg IntraMUSCular Q4H PRN    sodium chloride (NS) flush 5-40 mL  5-40 mL IntraVENous Q8H    sodium chloride (NS) flush 5-40 mL  5-40 mL IntraVENous PRN    enoxaparin (LOVENOX) injection 40 mg  40 mg SubCUTAneous Q24H    0.9% sodium chloride infusion  100 mL/hr IntraVENous CONTINUOUS    acetaminophen (TYLENOL) tablet 650 mg  650 mg Oral Q6H PRN    ondansetron (ZOFRAN) injection 4 mg  4 mg IntraVENous Q6H PRN    ampicillin-sulbactam (UNASYN) 1.5 g in 0.9% sodium chloride (MBP/ADV) 50 mL  1.5 g IntraVENous Q6H    pregabalin (LYRICA) capsule 225 mg  225 mg Oral BID    LORazepam (ATIVAN) tablet 1 mg  1 mg Oral Q8H PRN    HYDROcodone-acetaminophen (NORCO) 5-325 mg per tablet 2 Tab  2 Tab Oral Q4H PRN    carvedilol (COREG) tablet 25 mg  25 mg Oral BID WITH MEALS    DULoxetine (CYMBALTA) capsule 60 mg  60 mg Oral DAILY    methylphenidate HCl (RITALIN) tablet 10 mg  10 mg Oral BID    polyethylene glycol (MIRALAX) packet 17 g  17 g Oral QID PRN    tiZANidine (ZANAFLEX) tablet 2 mg  2 mg Oral DAILY PRN    buPROPion XL (WELLBUTRIN XL) tablet 450 mg  450 mg Oral 7am    calcium-vitamin D (OS-KRISTINE) 500 mg-200 unit tablet  1 Tab Oral DAILY    lisinopril (PRINIVIL, ZESTRIL) tablet 40 mg  40 mg Oral DAILY    zolpidem (AMBIEN) tablet 10 mg  10 mg Oral QHS PRN    methadone (DOLOPHINE) tablet 20 mg  20 mg Oral BID        Review of Systems  A comprehensive review of systems was negative except for that written in the HPI.     Objective:     Visit Vitals  /69   Pulse 94   Temp 99.5 °F (37.5 °C)   Resp 18   Ht 6' 7\" (2.007 m)   Wt 99.8 kg (220 lb)   SpO2 98%   BMI 24.78 kg/m²      O2 Device: Room air    Temp (24hrs), Av °F (37.2 °C), Min:98.4 °F (36.9 °C), Max:99.5 °F (37.5 °C)      No intake/output data recorded. 01/12 1901 - 01/14 0700  In: 3835 [I.V.:3835]  Out: 600 [Urine:600]    Visit Vitals  /69   Pulse 94   Temp 99.5 °F (37.5 °C)   Resp 18   Ht 6' 7\" (2.007 m)   Wt 99.8 kg (220 lb)   SpO2 98%   BMI 24.78 kg/m²     General:  Alert, cooperative, no distress, appears stated age. Head:  Normocephalic, without obvious abnormality, atraumatic. Eyes:  Conjunctivae/corneas clear. PERRL, EOMs intact. Fundi benign   Ears:  Normal TMs and external ear canals both ears. Nose: Nares normal. Septum midline. Mucosa normal. No drainage or sinus tenderness. Throat: Lips, mucosa, and tongue normal. Teeth and gums normal.   Neck: Supple, symmetrical, trachea midline, no adenopathy, thyroid: no enlargement/tenderness/nodules, no carotid bruit and no JVD. Back:   Symmetric, no curvature. ROM normal. No CVA tenderness. Lungs:   Clear to auscultation bilaterally. Chest wall:  No tenderness or deformity. Heart:  Regular rate and rhythm, S1, S2 normal, no murmur, click, rub or gallop. Extremities: Extremities normal, atraumatic, no cyanosis or edema. Pulses: 2+ and symmetric all extremities. Skin: Skin color, texture, turgor normal. No rashes or lesions   Lymph nodes: Cervical, supraclavicular, and axillary nodes normal.   Neurologic: CNII-XII intact. Normal strength, sensation and reflexes throughout. Additional comments:None    Data Review    No results found for this or any previous visit (from the past 24 hour(s)). Assessment/Plan:     Principal Problem:    Cellulitis (1/13/2019)    now with abscess formation - will swithc to zosyn and vancomycin. Us and surgery consult. djd back and unstable gait - ptot    Agitation overnight - pt sees psych as op by report. Bed moved closer to nursing station. inpt psych consult for psychosis with mdd  Hx of asthma/anemia/htn - cont coreg, prinivil.  duonebs prn.       Care Plan discussed with: Patient/Family    Total time spent with patient/care team: 30 minutes.     Signed By: Cecy Cedeno,      January 14, 2019

## 2019-01-14 NOTE — CONSULTS
Patient seen at request of Dr. Nicko Noel. Charles Chicas is an 77 y.o. male who was recently admitted with pain in the lateral aspect of the left thigh. Mr. Raquel Lakhani tells me that he fell approximately three weeks ago and landed on his left lateral thigh. He subsequently developed a painful mass which he opened with a razor blade. Purulent appearing drainage was obtained. Despite this, the mass has become progressively larger and more bothersome to him. No associated fevers or chills. No nausea or vomitting. Found to have an abscess. He has otherwise been in his usual state of health. Started on Vancomycin and Zosyn. Allergies - Amitriptyline; Neurontin [gabapentin]; and Percocet [oxycodone-acetaminophen]    Meds - Reviewed. PMH -   Past Medical History:   Diagnosis Date    Abscess 1/14/2019    Anemia NEC     Asthma     Chronic pain     Due to spinal stenosis    Constipation     HTN (hypertension)     Hypertension     Hypogonadism male     Other ill-defined conditions(799.89)     spinal stenosis    Psychiatric disorder     major depressive disorder    Sleep trouble     Spinal stenosis     Cervical and Lumbar    Vertebral compression fracture (HCC)      PSH -   Past Surgical History:   Procedure Laterality Date    HX BACK SURGERY      HX CATARACT REMOVAL Bilateral     HX CERVICAL LAMINECTOMY      HX TONSILLECTOMY       Fam Hx -   Family History   Problem Relation Age of Onset    Heart Disease Father     Hypertension Father     Stroke Father      Soc Hx -   Social History     Tobacco Use    Smoking status: Never Smoker    Smokeless tobacco: Never Used   Substance Use Topics    Alcohol use: No     Comment: Drank six weeks ago. Patient is a well developed, well nourished man in no acute distress. Visit Vitals  /69   Pulse 94   Temp 99.5 °F (37.5 °C)   Resp 18   Ht 6' 7\" (2.007 m)   Wt 220 lb (99.8 kg)   SpO2 98%   BMI 24.78 kg/m²     HEENT: Anicteric.   Neck: Supple without palpable lymphadenopathy  Cor: RRR. Lungs: Clear to auscultation bilaterally. Abd: Soft. Non distended. Non tender. No guarding or rebound. Left Lower Extremity: On the lateral aspect of the left thigh, there is a tender, fluctuant mass. Overlying skin is indurated and erythematous. Clinically, this is c/w an abscess. Neuro: Grossly Non focal.     Labs - No laboratory work from 1/14/2019. Imp: Pt. Is a 77 y.o. male with a left lateral thigh abscess. Plan: 1. To OR for drainage of left lateral thigh abscess. Discussed procedure with Mr. Mccarthy Factor including risks of bleeding, further infection, need for further surgery. He understands and wishes to proceed. 2. Needs consent. 3. NPO for now. 4. Continue IV abx - Vancomycin and Zosyn. 5. Pain medication and anti-emetics as needed. 6. Plans per Dr. Delon Bender. Following.

## 2019-01-14 NOTE — CONSULTS
PSYCHIATRIC CONSULTATION                         IDENTIFICATION:    Patient Name  Marialuisa Gardiner   Date of Birth 1950   [facility] [id number]   Medical Record Number  [id number]      Age  77 y.o. PCP Uday Gutierrez MD   Admit date:  1/12/2019    Room Number  [address]  @ [facility]   Date of Service  1/14/2019            HISTORY         REASON FOR HOSPITALIZATION/CONSULTATION:  A psychiatric consultation was requested by Vasile Johnson DO to evaluate or provide advice/opinion related to evaluating agitation / psychosis  CC: \"anxiety\"    HISTORY OF PRESENT ILLNESS:    The patient, Marialuisa Gardiner, is a 77 y.o. WHITE [place]  male with a past psychiatric history significant for MDD with psychosis who was admitted to the medical floor for the treatment of Cellulitis. Patient reports/evidences the following emotional symptoms:  agitation and anxiety. The above symptoms have been present for 24+ hours. These symptoms are of mild severity. The symptoms are intermittent/ fleeting in nature. Additional symptomatology include anger outbursts . The patient's condition has been precipitated by psychosocial stressors (stress of hospitalization and medical illness). Patient seen in his room, supine on hospital bed. He corroborates the above narrative, stating that he has been having anxiety since coming to the hospital, specifically with his aftercare planning which he hopes will include a home health aide. Patient reports that he has difficulty ambulating secondary to severe osteoarthritis and lumbar stenosis and his mood has worsened due to the resulting effect on his quality of life. The patient lists his current outpatient psychiatric medication regimen, including Wellbutrin, Cymbalta, and Ativan. Psycho-education provided regarding the black box warning of combined use of opioids and benzodiazpines, patient voices understanding.  Patient reports that regarding agitation, he becomes more anxious and irritable when he does not take his Ativan 3 times daily. He is able to provide a pharmacy for a medication reconciliation. Patient presently amenable to starting a safer alternative to his Ativan. Risks and benefits of Buspar discussed. The patient denies SI/HI/PI/AVH. He denies any thoughts of self harm, and is able to voice understanding of his present medical illness and the current treatment plan. The patient is oriented to person, place, time, and situation. He is able to list the past 3 presidents in reverse chronological order and can perform simple calculation. Medication reconciliation from [address] [place] [address] ([phone]) Wellbutrin  QDAY, Duloxetine 60 mg QDAY Lorazepam 1 mg TID PRN. [facility] also has [facility] Ritalin, oxycodone, methadone and Lyrica in the past 2 months     ALLERGIES:  Allergies   Allergen Reactions    Amitriptyline Other (comments)     hallucinating    Neurontin [Gabapentin] Other (comments)     insomnia    Percocet [Oxycodone-Acetaminophen] Nausea Only and Anxiety     Pt states he can tolerate small doses at 10 mg a day      MEDICATIONS PRIOR TO ADMISSION:  Medications Prior to Admission   Medication Sig    traZODone (DESYREL) 50 mg tablet Take 50 mg by mouth nightly as needed for Sleep.  carvedilol (COREG) 25 mg tablet TAKE ONE TABLET BY MOUTH TWICE A DAY WITH MEALS    ONDANSETRON PO Take 4 mg by mouth two (2) times daily as needed.  testosterone cypionate (DEPOTESTOTERONE CYPIONATE) 200 mg/mL injection INJECT 0.5 MILLILITERS ONCE EVERY 7 DAYS (Patient taking differently: INJECT 0.5 MILLILITERS ONCE EVERY 7 DAYS (FRIDAYS))    Syringe with Needle, Disp, (BD LUER-HANG SYRINGE) 3 mL 21 gauge x 1\" syrg USE AS DIRECTED every 7 days.  buPROPion XL (WELLBUTRIN XL) 300 mg XL tablet 450 mg every morning.  VOLTAREN 1 % gel 2 g daily as needed.  methylphenidate (RITALIN) 10 mg tablet Take 10 mg by mouth two (2) times a day.  Up to 3 tabs daily    LYRICA 225 mg capsule 225 mg two (2) times a day.  tiZANidine (ZANAFLEX) 2 mg tablet Take 2 mg by mouth daily as needed.  CALCIUM 600 WITH VITAMIN D3 600 mg(1,500mg) -400 unit chew     methadone (DOLOPHINE) 10 mg tablet Take 10 mg by mouth two (2) times a day.  HYDROcodone-acetaminophen (NORCO)  mg tablet Take  by mouth four (4) times daily. 2 tabs in AM, 1 at lunch, 1 at dinner, 1 at bedtime    lisinopril (PRINIVIL, ZESTRIL) 40 mg tablet Take 1 tablet by mouth daily.  polyethylene glycol (MIRALAX) 17 gram/dose powder 17 g four (4) times daily.  LORazepam (ATIVAN) 1 mg tablet Take 1 Tab by mouth every eight (8) hours as needed.  omeprazole (PRILOSEC) 20 mg capsule TAKE ONE CAPSULE BY MOUTH EVERY DAY    DULoxetine (CYMBALTA) 60 mg capsule Take 60 mg by mouth daily.  zolpidem (AMBIEN) 10 mg tablet Take  by mouth nightly as needed for Sleep. PAST MEDICAL HISTORY:  Past Medical History:   Diagnosis Date    Anemia NEC     Asthma     Chronic pain     Due to spinal stenosis    Constipation     HTN (hypertension)     Hypertension     Hypogonadism male     Other ill-defined conditions(744.89)     spinal stenosis    Psychiatric disorder     major depressive disorder    Sleep trouble     Spinal stenosis     Cervical and Lumbar    Vertebral compression fracture (HCC)      Past Surgical History:   Procedure Laterality Date    HX BACK SURGERY      HX CATARACT REMOVAL Bilateral     HX CERVICAL LAMINECTOMY      HX TONSILLECTOMY        SOCIAL HISTORY: lives in a residence in [facility].  Not working  Social History     Socioeconomic History    Marital status:      Spouse name: Not on file    Number of children: Not on file    Years of education: Not on file    Highest education level: Not on file   Social Needs    Financial resource strain: Not on file    Food insecurity - worry: Not on file    Food insecurity - inability: Not on file   LoggedIn needs - medical: Not on file    Transportation needs - non-medical: Not on file   Occupational History    Not on file   Tobacco Use    Smoking status: Never Smoker    Smokeless tobacco: Never Used   Substance and Sexual Activity    Alcohol use: No     Comment: Drank six weeks ago.  Drug use: No    Sexual activity: No   Other Topics Concern    Not on file   Social History Narrative    Not on file      FAMILY HISTORY: History reviewed. No pertinent family history. Family History   Problem Relation Age of Onset    Heart Disease Father     Hypertension Father     Stroke Father        REVIEW of SYSTEMS:   Negative except back and leg pain per HPI, chronic  Pertinent items are noted in the History of Present Illness. All other Systems reviewed and are considered negative. MENTAL STATUS EXAM & VITALS       MENTAL STATUS EXAM (MSE):    MSE FINDINGS ARE WITHIN NORMAL LIMITS (WNL) UNLESS OTHERWISE STATED BELOW. Orientation oriented to time, place and person   Vital Signs (BP,Pulse, Temp) See below (reviewed 1/14/2019); vital signs are WNL if not listed below.    Gait and Station Stable, no ataxia   Abnormal Muscular Movements/Tone/Behavior No EPS, no Tardive Dyskinesia, no abnormal muscular movements; wnl tone   Relations cooperative   General Appearance:  older than stated age   Language No aphasia or dysarthria   Speech:  normal volume and non-pressured   Thought Processes logical, wnl rate of thoughts, good abstract reasoning and computation   Thought Associations logical   Thought Content free of delusions and free of hallucinations   Suicidal Ideations none   Homicidal Ideations contracts for safety   Mood:  anxious   Affect:  euthymic and mood-incongruent   Memory recent  adequate   Memory remote:  adequate   Concentration/Attention:  adequate   Fund of Knowledge average   Insight:  fair   Reliability poor   Judgment:  poor            VITALS:     Patient Vitals for the past 24 hrs:   Temp Pulse Resp BP SpO2   01/14/19 0750 99.5 °F (37.5 °C) 94 18 165/69 98 %   01/14/19 0322 99.2 °F (37.3 °C) 93 18 161/73 99 %   01/13/19 1944 98.4 °F (36.9 °C) 84 18 129/62 98 %   01/13/19 1630 98.7 °F (37.1 °C) 82 16 147/79 98 %              DATA     LABORATORY DATA:  Labs Reviewed   CBC WITH AUTOMATED DIFF - Abnormal; Notable for the following components:       Result Value    WBC 13.4 (*)     RBC 3.84 (*)     HGB 11.6 (*)     HCT 36.0 (*)     NEUTROPHILS 84 (*)     LYMPHOCYTES 5 (*)     IMMATURE GRANULOCYTES 1 (*)     ABS. NEUTROPHILS 11.3 (*)     ABS. LYMPHOCYTES 0.7 (*)     ABS. MONOCYTES 1.3 (*)     ABS. IMM. GRANS. 0.1 (*)     All other components within normal limits   METABOLIC PANEL, COMPREHENSIVE - Abnormal; Notable for the following components:    Glucose 120 (*)     BUN 36 (*)     BUN/Creatinine ratio 29 (*)     GFR est non-AA 57 (*)     Bilirubin, total 1.4 (*)     AST (SGOT) 61 (*)     Protein, total 6.1 (*)     Albumin 2.7 (*)     A-G Ratio 0.8 (*)     All other components within normal limits   CK - Abnormal; Notable for the following components:    CK 1,324 (*)     All other components within normal limits   METABOLIC PANEL, BASIC - Abnormal; Notable for the following components:    Glucose 117 (*)     BUN 37 (*)     BUN/Creatinine ratio 36 (*)     All other components within normal limits   CBC WITH AUTOMATED DIFF - Abnormal; Notable for the following components:    WBC 13.7 (*)     RBC 3.46 (*)     HGB 10.7 (*)     HCT 32.5 (*)     BAND NEUTROPHILS 7 (*)     ABS.  NEUTROPHILS 11.3 (*)     All other components within normal limits   CULTURE, BLOOD   CULTURE, WOUND W GRAM STAIN   URINALYSIS W/ REFLEX CULTURE   DRUG SCREEN, URINE     Admission on 01/12/2019   Component Date Value Ref Range Status    WBC 01/12/2019 13.4* 4.1 - 11.1 K/uL Final    RBC 01/12/2019 3.84* 4.10 - 5.70 M/uL Final    HGB 01/12/2019 11.6* 12.1 - 17.0 g/dL Final    HCT 01/12/2019 36.0* 36.6 - 50.3 % Final    MCV 01/12/2019 93.8  80.0 - 99.0 FL Final    MCH 01/12/2019 30.2  26.0 - 34.0 PG Final    MCHC 01/12/2019 32.2  30.0 - 36.5 g/dL Final    RDW 01/12/2019 13.8  11.5 - 14.5 % Final    PLATELET 77/84/5388 610  150 - 400 K/uL Final    MPV 01/12/2019 11.0  8.9 - 12.9 FL Final    NRBC 01/12/2019 0.0  0  WBC Final    ABSOLUTE NRBC 01/12/2019 0.00  0.00 - 0.01 K/uL Final    NEUTROPHILS 01/12/2019 84* 32 - 75 % Final    LYMPHOCYTES 01/12/2019 5* 12 - 49 % Final    MONOCYTES 01/12/2019 10  5 - 13 % Final    EOSINOPHILS 01/12/2019 0  0 - 7 % Final    BASOPHILS 01/12/2019 0  0 - 1 % Final    IMMATURE GRANULOCYTES 01/12/2019 1* 0.0 - 0.5 % Final    ABS. NEUTROPHILS 01/12/2019 11.3* 1.8 - 8.0 K/UL Final    ABS. LYMPHOCYTES 01/12/2019 0.7* 0.8 - 3.5 K/UL Final    ABS. MONOCYTES 01/12/2019 1.3* 0.0 - 1.0 K/UL Final    ABS. EOSINOPHILS 01/12/2019 0.0  0.0 - 0.4 K/UL Final    ABS. BASOPHILS 01/12/2019 0.0  0.0 - 0.1 K/UL Final    ABS. IMM. GRANS. 01/12/2019 0.1* 0.00 - 0.04 K/UL Final    DF 01/12/2019 SMEAR SCANNED    Final    RBC COMMENTS 01/12/2019 NORMOCYTIC, NORMOCHROMIC    Final    WBC COMMENTS 01/12/2019 TOXIC GRANULATION    Final    Sodium 01/12/2019 140  136 - 145 mmol/L Final    Potassium 01/12/2019 4.0  3.5 - 5.1 mmol/L Final    Chloride 01/12/2019 103  97 - 108 mmol/L Final    CO2 01/12/2019 27  21 - 32 mmol/L Final    Anion gap 01/12/2019 10  5 - 15 mmol/L Final    Glucose 01/12/2019 120* 65 - 100 mg/dL Final    BUN 01/12/2019 36* 6 - 20 MG/DL Final    Creatinine 01/12/2019 1.25  0.70 - 1.30 MG/DL Final    BUN/Creatinine ratio 01/12/2019 29* 12 - 20   Final    GFR est AA 01/12/2019 >60  >60 ml/min/1.73m2 Final    GFR est non-AA 01/12/2019 57* >60 ml/min/1.73m2 Final    Calcium 01/12/2019 8.9  8.5 - 10.1 MG/DL Final    Bilirubin, total 01/12/2019 1.4* 0.2 - 1.0 MG/DL Final    ALT (SGPT) 01/12/2019 31  12 - 78 U/L Final    AST (SGOT) 01/12/2019 61* 15 - 37 U/L Final    Alk.  phosphatase 01/12/2019 86  45 - 117 U/L Final    Protein, total 01/12/2019 6.1* 6.4 - 8.2 g/dL Final    Albumin 01/12/2019 2.7* 3.5 - 5.0 g/dL Final    Globulin 01/12/2019 3.4  2.0 - 4.0 g/dL Final    A-G Ratio 01/12/2019 0.8* 1.1 - 2.2   Final    CK 01/12/2019 1,324* 39 - 308 U/L Final    Sodium 01/13/2019 139  136 - 145 mmol/L Final    Potassium 01/13/2019 4.1  3.5 - 5.1 mmol/L Final    Chloride 01/13/2019 106  97 - 108 mmol/L Final    CO2 01/13/2019 26  21 - 32 mmol/L Final    Anion gap 01/13/2019 7  5 - 15 mmol/L Final    Glucose 01/13/2019 117* 65 - 100 mg/dL Final    BUN 01/13/2019 37* 6 - 20 MG/DL Final    Creatinine 01/13/2019 1.04  0.70 - 1.30 MG/DL Final    BUN/Creatinine ratio 01/13/2019 36* 12 - 20   Final    GFR est AA 01/13/2019 >60  >60 ml/min/1.73m2 Final    GFR est non-AA 01/13/2019 >60  >60 ml/min/1.73m2 Final    Calcium 01/13/2019 8.5  8.5 - 10.1 MG/DL Final    WBC 01/13/2019 13.7* 4.1 - 11.1 K/uL Final    RBC 01/13/2019 3.46* 4.10 - 5.70 M/uL Final    HGB 01/13/2019 10.7* 12.1 - 17.0 g/dL Final    HCT 01/13/2019 32.5* 36.6 - 50.3 % Final    MCV 01/13/2019 93.9  80.0 - 99.0 FL Final    MCH 01/13/2019 30.9  26.0 - 34.0 PG Final    MCHC 01/13/2019 32.9  30.0 - 36.5 g/dL Final    RDW 01/13/2019 13.8  11.5 - 14.5 % Final    PLATELET 85/84/2403 963  150 - 400 K/uL Final    MPV 01/13/2019 11.5  8.9 - 12.9 FL Final    NRBC 01/13/2019 0.0  0  WBC Final    ABSOLUTE NRBC 01/13/2019 0.00  0.00 - 0.01 K/uL Final    NEUTROPHILS 01/13/2019 75  32 - 75 % Final    BAND NEUTROPHILS 01/13/2019 7* 0 - 6 % Final    LYMPHOCYTES 01/13/2019 12  12 - 49 % Final    MONOCYTES 01/13/2019 5  5 - 13 % Final    EOSINOPHILS 01/13/2019 1  0 - 7 % Final    BASOPHILS 01/13/2019 0  0 - 1 % Final    IMMATURE GRANULOCYTES 01/13/2019 0  % Final    ABS. NEUTROPHILS 01/13/2019 11.3* 1.8 - 8.0 K/UL Final    ABS. LYMPHOCYTES 01/13/2019 1.6  0.8 - 3.5 K/UL Final    ABS. MONOCYTES 01/13/2019 0.7  0.0 - 1.0 K/UL Final    ABS. EOSINOPHILS 01/13/2019 0.1  0.0 - 0.4 K/UL Final    ABS. BASOPHILS 01/13/2019 0.0  0.0 - 0.1 K/UL Final    ABS. IMM. GRANS. 01/13/2019 0.0  K/UL Final    DF 01/13/2019 MANUAL    Final    RBC COMMENTS 01/13/2019 NORMOCYTIC, NORMOCHROMIC    Final        RADIOLOGY REPORTS:  Results from Hospital Encounter encounter on 01/17/15   XR ABD FLAT/ ERECT    Narrative **Final Report**       ICD Codes / Adm. Diagnosis: 458.9  584.9 / Hypotension, unspecified  Acute   kidney failure, unspecif  Examination:  CR ABDOMEN FAU  - [id number] - Jan 17 2015 10:05AM  Accession No:  [id number]  Reason:  distended abdomin, with narcotic use, please eval for extent of   constipation      REPORT:  History: Distended abdomen. Supine and upright views of the abdomen demonstrates calcifications in the   right upper quadrant compatible with gallstones. There is a nonspecific   bowel gas pattern with a dilated bowel loop in the left abdomen but air is   seen throughout the colon. Soft tissue detail is normal.    No free air is demonstrated. There are degenerative changes of the hips and   spine. IMPRESSION:    1. Nonspecific bowel gas pattern with distended small bowel loop left   abdomen. 2. Cholelithiasis. Signing/Reading Doctor: Papa Muro ([id number])    Wiley Costello ([id number])  Jan 17 2015 10:50AM                               Ct Head Wo Cont    Result Date: 1/12/2019  INDICATION:   multiple falls EXAM:  HEAD CT WITHOUT CONTRAST COMPARISON:  January 17, 2015 TECHNIQUE:  Routine noncontrast axial head CT was performed. Sagittal and coronal reconstructions were generated. CT dose reduction was achieved through use of a standardized protocol tailored for this examination and automatic exposure control for dose modulation. FINDINGS: Ventricles:  Midline, no hydrocephalus. Intracranial Hemorrhage:  None. Brain Parenchyma/Brainstem:  Normal for age.  Basal Cisterns:  Normal. Paranasal Sinuses:  Visualized sinuses are clear. Additional Comments:  N/A. IMPRESSION: No acute process.               MEDICATIONS       ALL MEDICATIONS  Current Facility-Administered Medications   Medication Dose Route Frequency    haloperidol lactate (HALDOL) injection 5 mg  5 mg IntraMUSCular Q4H PRN    piperacillin-tazobactam (ZOSYN) 3.375 g in 0.9% sodium chloride (MBP/ADV) 100 mL  3.375 g IntraVENous Q8H    vancomycin (VANCOCIN) 2,000 mg in 0.9% sodium chloride 500 mL IVPB  2,000 mg IntraVENous ONCE    sodium chloride (NS) flush 5-40 mL  5-40 mL IntraVENous Q8H    sodium chloride (NS) flush 5-40 mL  5-40 mL IntraVENous PRN    enoxaparin (LOVENOX) injection 40 mg  40 mg SubCUTAneous Q24H    0.9% sodium chloride infusion  100 mL/hr IntraVENous CONTINUOUS    acetaminophen (TYLENOL) tablet 650 mg  650 mg Oral Q6H PRN    ondansetron (ZOFRAN) injection 4 mg  4 mg IntraVENous Q6H PRN    pregabalin (LYRICA) capsule 225 mg  225 mg Oral BID    LORazepam (ATIVAN) tablet 1 mg  1 mg Oral Q8H PRN    HYDROcodone-acetaminophen (NORCO) 5-325 mg per tablet 2 Tab  2 Tab Oral Q4H PRN    carvedilol (COREG) tablet 25 mg  25 mg Oral BID WITH MEALS    DULoxetine (CYMBALTA) capsule 60 mg  60 mg Oral DAILY    methylphenidate HCl (RITALIN) tablet 10 mg  10 mg Oral BID    polyethylene glycol (MIRALAX) packet 17 g  17 g Oral QID PRN    tiZANidine (ZANAFLEX) tablet 2 mg  2 mg Oral DAILY PRN    buPROPion XL (WELLBUTRIN XL) tablet 450 mg  450 mg Oral 7am    calcium-vitamin D (OS-KRISTINE) 500 mg-200 unit tablet  1 Tab Oral DAILY    lisinopril (PRINIVIL, ZESTRIL) tablet 40 mg  40 mg Oral DAILY    zolpidem (AMBIEN) tablet 10 mg  10 mg Oral QHS PRN    methadone (DOLOPHINE) tablet 20 mg  20 mg Oral BID      SCHEDULED MEDICATIONS  Current Facility-Administered Medications   Medication Dose Route Frequency    piperacillin-tazobactam (ZOSYN) 3.375 g in 0.9% sodium chloride (MBP/ADV) 100 mL  3.375 g IntraVENous Q8H    vancomycin (VANCOCIN) 2,000 mg in 0.9% sodium chloride 500 mL IVPB  2,000 mg IntraVENous ONCE    sodium chloride (NS) flush 5-40 mL  5-40 mL IntraVENous Q8H    enoxaparin (LOVENOX) injection 40 mg  40 mg SubCUTAneous Q24H    0.9% sodium chloride infusion  100 mL/hr IntraVENous CONTINUOUS    pregabalin (LYRICA) capsule 225 mg  225 mg Oral BID    carvedilol (COREG) tablet 25 mg  25 mg Oral BID WITH MEALS    DULoxetine (CYMBALTA) capsule 60 mg  60 mg Oral DAILY    methylphenidate HCl (RITALIN) tablet 10 mg  10 mg Oral BID    buPROPion XL (WELLBUTRIN XL) tablet 450 mg  450 mg Oral 7am    calcium-vitamin D (OS-KRISTINE) 500 mg-200 unit tablet  1 Tab Oral DAILY    lisinopril (PRINIVIL, ZESTRIL) tablet 40 mg  40 mg Oral DAILY    methadone (DOLOPHINE) tablet 20 mg  20 mg Oral BID                ASSESSMENT & PLAN        The patient Mckenna Brush is a 77 y.o.  male who presents at this time for treatment of the following diagnoses:  Patient Active Hospital Problem List:   Agitation    Assessment: Evening agitation likely due to benzodiazapine withdrawal and concomitant axis 2 traits. Patient calm and cooperative on interview, reporting stable depression, and on typical regimen of antidepressant and anxiolytic., He is not displaying any signs or symptoms of psychosis, mood dysregulation or delirium. Patient is also taking stimulant and opiate pain medication, which confer a significant risk when combined with benzodiazepines. Patient counseled on the risk and benefit of such a regimen, and states that he is open to starting an alternative medication.         Plan:  - CONTINUE Wellbutrin  mg QDAY for MDD  - CONTINUE Cymbalta 60 mg QDAY for MDD, pain adjunct  - CONTINUE Ativan 1 mg Q8H PRN anxiety  - START Buspar 5 mg BID for anxiety  - This patient does not require inpatient hospitalization at this time  - Patient can leave the hospital AMA without further psychiatric assessment  - For acute agitation can give Haldol  5 mg PO/IM Q4H PRN  - Patient can follow up with his outpatient psychiatrist Dr. Iesha Whitt upon discharge ([phone])    Thank you very much for the opportunity to participate in the care of your patient. I will continue to follow up with patient as deemed appropriate. A coordinated, multidisplinary treatment team round was conducted with the patient; that includes the nurse, unit pharmcist,  and writer all present. The following regarding medications was addressed during rounds with patient:   the risks and benefits of the proposed medication. The patient was given the opportunity to ask questions. Informed consent given to the use of the above medications. I will continue to adjust psychiatric and non-psychiatric medications (see above \"medication\" section and orders section for details) as deemed appropriate & based upon diagnoses and response to treatment. I have reviewed admission (and previous/old) labs and medical tests in the EHR and or transferring hospital documents. I will continue to order blood tests/labs and diagnostic tests as deemed appropriate and review results as they become available (see orders for details). I have reviewed old psychiatric and medical records available in the EHR. I Will order additional psychiatric records from other institutions to further elucidate the nature of patient's psychopathology and review once available. I will gather additional collateral information from friends, family and o/p treatment team to further elucidate the nature of patient's psychopathology and baselline level of psychiatric functioning.                      SIGNED:    Jayce Mckeon MD  1/14/2019

## 2019-01-14 NOTE — PROGRESS NOTES
Problem: Mobility Impaired (Adult and Pediatric)  Goal: *Acute Goals and Plan of Care (Insert Text)  Physical Therapy Goals  Initiated 1/14/2019  1. Patient will move from supine to sit and sit to supine  in bed with modified independence within 7 day(s). 2.  Patient will transfer from bed to chair and chair to bed with supervision/set-up using the least restrictive device within 7 day(s). 3.  Patient will perform sit to stand with supervision/set-up within 7 day(s). 4.  Patient will ambulate with contact guard assist for 200 feet with the least restrictive device within 7 day(s). physical Therapy EVALUATION  Patient: Josephine Tavera (9 y.o. male)  Date: 1/14/2019  Primary Diagnosis: Cellulitis  Procedure(s) (LRB):  INCISION AND DRAINAGE LEFT THIGH ABSCESS (Left)     Precautions:        ASSESSMENT :  Based on the objective data described below, the patient presents with decreased balance, endurance, strength, and functional mobility. Patient reports that he lives alone, but is interested in having a home health aide for a few hours a day to assist with certain ADLs. Patient performed bed mobility with modified independence. Patient stood with stand-by assistance to walker. Patient transferred to [facility] prior to ambulation. Patient ambulated 100 feet with rolling walker and contact guard assistance. Chair follow utilized secondary to patient's report of occasional knee buckling. Patient with slow pace and forward flexed posture. No loss of balance noted. Patient reports that he wants a rollator walker for ambulation secondary to limited endurance and BLE weakness. Patient will benefit from skilled intervention to address the above impairments.   Patients rehabilitation potential is considered to be Good  Factors which may influence rehabilitation potential include:   []         None noted  []         Mental ability/status  [x]         Medical condition  []         Home/family situation and support systems  []         Safety awareness  []         Pain tolerance/management  []         Other:      PLAN :  Recommendations and Planned Interventions:  [x]           Bed Mobility Training             [x]    Neuromuscular Re-Education  [x]           Transfer Training                   []    Orthotic/Prosthetic Training  [x]           Gait Training                         []    Modalities  [x]           Therapeutic Exercises           []    Edema Management/Control  [x]           Therapeutic Activities            [x]    Patient and Family Training/Education  []           Other (comment):    Frequency/Duration: Patient will be followed by physical therapy 3 times a week to address goals. Discharge Recommendations: Home Health  Further Equipment Recommendations for Discharge: rollator walker     SUBJECTIVE:   Patient stated I'm feeling stronger.     OBJECTIVE DATA SUMMARY:   HISTORY:    Past Medical History:   Diagnosis Date    Abscess 1/14/2019    Anemia NEC     Asthma     Chronic pain     Due to spinal stenosis    Constipation     HTN (hypertension)     Hypertension     Hypogonadism male     Other ill-defined conditions(799.89)     spinal stenosis    Psychiatric disorder     major depressive disorder    Sleep trouble     Spinal stenosis     Cervical and Lumbar    Vertebral compression fracture (HCC)      Past Surgical History:   Procedure Laterality Date    HX BACK SURGERY      HX CATARACT REMOVAL Bilateral     HX CERVICAL LAMINECTOMY      HX TONSILLECTOMY       Prior Level of Function/Home Situation: Patient reports that he uses a bariatric rolling walker at home. Lives alone.      Home Situation  Home Environment: Apartment  One/Two Story Residence: One story  Living Alone: Yes  Support Systems: Child(halima)  Patient Expects to be Discharged to[de-identified] Apartment  Current DME Used/Available at Home: Cane, straight    EXAMINATION/PRESENTATION/DECISION MAKING:   Critical Behavior:  Neurologic State: Alert  Orientation Level: Oriented to person, Oriented to situation, Oriented to place  Cognition: Appropriate safety awareness, Follows commands, Impaired decision making     Hearing: Auditory  Auditory Impairment: None    Range Of Motion:  AROM: Generally decreased, functional  PROM: Generally decreased, functional     Strength:    Strength: Generally decreased, functional     Functional Mobility:  Bed Mobility:  Supine to Sit: Modified independent  Sit to Supine: Modified independent  Scooting: Modified independent     Transfers:  Sit to Stand: Stand-by assistance  Stand to Sit: Stand-by assistance     Balance:   Sitting: Intact  Standing: Impaired; With support  Standing - Static: Good  Standing - Dynamic : Fair     Ambulation/Gait Training:  Distance (ft): 100 Feet (ft)  Assistive Device: Gait belt;Walker, rolling  Ambulation - Level of Assistance: Contact guard assistance  Gait Description (WDL): Exceptions to WDL  Gait Abnormalities: Decreased step clearance(Forward flexed )  Base of Support: Narrowed  Speed/Renea: Pace decreased (<100 feet/min)  Step Length: Right shortened;Left shortened    Based on the above components, the patient evaluation is determined to be of the following complexity level: LOW     Pain:  Pain Scale 1: Numeric (0 - 10)  Pain Intensity 1: 3  Pain Location 1: Neck  Pain Orientation 1: Lower  Pain Description 1: Aching  Pain Intervention(s) 1: (prn norco)     Activity Tolerance:   Fair  Please refer to the flowsheet for vital signs taken during this treatment. After treatment:   []         Patient left in no apparent distress sitting up in chair  [x]         Patient left in no apparent distress in bed  [x]         Call bell left within reach  [x]         Nursing notified  []         Caregiver present  [x]         Bed alarm activated    COMMUNICATION/EDUCATION:   The patients plan of care was discussed with: Registered Nurse.   [x]         Fall prevention education was provided and the patient/caregiver indicated understanding. [x]         Patient/family have participated as able in goal setting and plan of care. [x]         Patient/family agree to work toward stated goals and plan of care. []         Patient understands intent and goals of therapy, but is neutral about his/her participation. []         Patient is unable to participate in goal setting and plan of care.     Thank you for this referral.  Adeel Novak, PT   Time Calculation: 18 mins

## 2019-01-14 NOTE — PROGRESS NOTES
Bedside shift change report given to me (oncoming nurse) by Mariella Mercado (offgoing nurse). Report included the following information SBAR, Kardex, Intake/Output, MAR and Recent Results. 0610: Pt is immensely confused and has become progressively so over the course of the night. Pt has been repeatedly calling out through the night about random issues. Pt has been incontinent  of bowel since he was admitted though he refuses to acknowledge it. Pt was unable to comprehend how to use the remote to operate the call bell remote after several explanations. Pt had got up and walked to the other bed without assistance and was dressing himself saying he needed to go to see the Dr, he also ripped out his IV. Pt placed on bed alarm Pt has been up all night without sleep.     0616: Pt has been setting bed alarm off every 15-30min for various reasons without calling first. Went in when alarm had been set off and pt said that he was tired of it going off. Explained to pt why the alarm was set and its importance. Pt said he wanted it off and that he wanted to sit on the side of the bed. Pt said he would not get up without calling for assistance. Bed alarm refused, supervisor notified.

## 2019-01-15 LAB — CREAT SERPL-MCNC: 0.77 MG/DL (ref 0.7–1.3)

## 2019-01-15 PROCEDURE — 97116 GAIT TRAINING THERAPY: CPT | Performed by: PHYSICAL THERAPIST

## 2019-01-15 PROCEDURE — 82565 ASSAY OF CREATININE: CPT

## 2019-01-15 PROCEDURE — 96372 THER/PROPH/DIAG INJ SC/IM: CPT

## 2019-01-15 PROCEDURE — 36415 COLL VENOUS BLD VENIPUNCTURE: CPT

## 2019-01-15 PROCEDURE — 74011000258 HC RX REV CODE- 258: Performed by: NEUROMUSCULOSKELETAL MEDICINE & OMM

## 2019-01-15 PROCEDURE — G0378 HOSPITAL OBSERVATION PER HR: HCPCS

## 2019-01-15 PROCEDURE — 74011000250 HC RX REV CODE- 250: Performed by: NEUROMUSCULOSKELETAL MEDICINE & OMM

## 2019-01-15 PROCEDURE — 96366 THER/PROPH/DIAG IV INF ADDON: CPT

## 2019-01-15 PROCEDURE — 74011250637 HC RX REV CODE- 250/637: Performed by: NEUROMUSCULOSKELETAL MEDICINE & OMM

## 2019-01-15 PROCEDURE — 99218 HC RM OBSERVATION: CPT

## 2019-01-15 PROCEDURE — 97535 SELF CARE MNGMENT TRAINING: CPT

## 2019-01-15 PROCEDURE — 74011250636 HC RX REV CODE- 250/636: Performed by: HOSPITALIST

## 2019-01-15 PROCEDURE — 74011250636 HC RX REV CODE- 250/636: Performed by: NEUROMUSCULOSKELETAL MEDICINE & OMM

## 2019-01-15 PROCEDURE — 97165 OT EVAL LOW COMPLEX 30 MIN: CPT

## 2019-01-15 PROCEDURE — 74011250637 HC RX REV CODE- 250/637: Performed by: PSYCHIATRY & NEUROLOGY

## 2019-01-15 RX ORDER — BUSPIRONE HYDROCHLORIDE 5 MG/1
5 TABLET ORAL 2 TIMES DAILY
Qty: 28 TAB | Refills: 1 | Status: SHIPPED | OUTPATIENT
Start: 2019-01-15 | End: 2019-01-23

## 2019-01-15 RX ORDER — METHADONE HYDROCHLORIDE 10 MG/1
10 TABLET ORAL 2 TIMES DAILY
Status: DISCONTINUED | OUTPATIENT
Start: 2019-01-15 | End: 2019-01-24 | Stop reason: HOSPADM

## 2019-01-15 RX ADMIN — METHADONE HYDROCHLORIDE 10 MG: 10 TABLET ORAL at 21:53

## 2019-01-15 RX ADMIN — METHYLPHENIDATE HYDROCHLORIDE 10 MG: 5 TABLET ORAL at 19:00

## 2019-01-15 RX ADMIN — POLYETHYLENE GLYCOL 3350 17 G: 17 POWDER, FOR SOLUTION ORAL at 21:53

## 2019-01-15 RX ADMIN — BUSPIRONE HYDROCHLORIDE 5 MG: 5 TABLET ORAL at 09:53

## 2019-01-15 RX ADMIN — BUPROPION HYDROCHLORIDE 450 MG: 150 TABLET, EXTENDED RELEASE ORAL at 10:00

## 2019-01-15 RX ADMIN — VANCOMYCIN HYDROCHLORIDE 1250 MG: 1 INJECTION, POWDER, LYOPHILIZED, FOR SOLUTION INTRAVENOUS at 14:00

## 2019-01-15 RX ADMIN — Medication 10 ML: at 21:54

## 2019-01-15 RX ADMIN — METHADONE HYDROCHLORIDE 10 MG: 10 TABLET ORAL at 12:39

## 2019-01-15 RX ADMIN — PREGABALIN 225 MG: 75 CAPSULE ORAL at 09:52

## 2019-01-15 RX ADMIN — VANCOMYCIN HYDROCHLORIDE 1250 MG: 1 INJECTION, POWDER, LYOPHILIZED, FOR SOLUTION INTRAVENOUS at 03:00

## 2019-01-15 RX ADMIN — CALCIUM CARBONATE-VITAMIN D TAB 500 MG-200 UNIT 1 TABLET: 500-200 TAB at 09:52

## 2019-01-15 RX ADMIN — ZOLPIDEM TARTRATE 10 MG: 5 TABLET ORAL at 23:22

## 2019-01-15 RX ADMIN — PIPERACILLIN SODIUM AND TAZOBACTAM SODIUM 3.38 G: 3; .375 INJECTION, POWDER, LYOPHILIZED, FOR SOLUTION INTRAVENOUS at 21:53

## 2019-01-15 RX ADMIN — HYDROCODONE BITARTRATE AND ACETAMINOPHEN 2 TABLET: 5; 325 TABLET ORAL at 10:00

## 2019-01-15 RX ADMIN — CARVEDILOL 25 MG: 12.5 TABLET, FILM COATED ORAL at 19:01

## 2019-01-15 RX ADMIN — ENOXAPARIN SODIUM 40 MG: 40 INJECTION, SOLUTION INTRAVENOUS; SUBCUTANEOUS at 09:53

## 2019-01-15 RX ADMIN — HYDROCODONE BITARTRATE AND ACETAMINOPHEN 2 TABLET: 5; 325 TABLET ORAL at 13:46

## 2019-01-15 RX ADMIN — LORAZEPAM 1 MG: 1 TABLET ORAL at 11:35

## 2019-01-15 RX ADMIN — BUSPIRONE HYDROCHLORIDE 5 MG: 5 TABLET ORAL at 18:00

## 2019-01-15 RX ADMIN — DULOXETINE HYDROCHLORIDE 60 MG: 30 CAPSULE, DELAYED RELEASE ORAL at 09:53

## 2019-01-15 RX ADMIN — Medication 10 ML: at 05:24

## 2019-01-15 RX ADMIN — LORAZEPAM 1 MG: 1 TABLET ORAL at 20:22

## 2019-01-15 RX ADMIN — LISINOPRIL 40 MG: 20 TABLET ORAL at 09:53

## 2019-01-15 RX ADMIN — METHYLPHENIDATE HYDROCHLORIDE 10 MG: 5 TABLET ORAL at 09:53

## 2019-01-15 RX ADMIN — PIPERACILLIN SODIUM AND TAZOBACTAM SODIUM 3.38 G: 3; .375 INJECTION, POWDER, LYOPHILIZED, FOR SOLUTION INTRAVENOUS at 12:42

## 2019-01-15 RX ADMIN — HYDROCODONE BITARTRATE AND ACETAMINOPHEN 2 TABLET: 5; 325 TABLET ORAL at 20:22

## 2019-01-15 RX ADMIN — Medication 5 ML: at 13:00

## 2019-01-15 RX ADMIN — CARVEDILOL 25 MG: 12.5 TABLET, FILM COATED ORAL at 09:52

## 2019-01-15 RX ADMIN — PREGABALIN 225 MG: 75 CAPSULE ORAL at 19:12

## 2019-01-15 RX ADMIN — PIPERACILLIN SODIUM AND TAZOBACTAM SODIUM 3.38 G: 3; .375 INJECTION, POWDER, LYOPHILIZED, FOR SOLUTION INTRAVENOUS at 05:24

## 2019-01-15 NOTE — PROGRESS NOTES
Spiritual Care Partner Volunteer visited patient in PCU on January 14, 2016.   Documented by:    SHRUTHI Akbar, [facility], Chaplain BONNIE HUANG [facility] Paging Service  [phone])

## 2019-01-15 NOTE — PERIOP NOTES
Handoff Report from Operating Room to PACU    Report received from YUMIKO Owens RN and Dr. Forest Vera regarding Tor Frame. Surgeon(s):  Sharad Liu MD  And Procedure(s) (LRB):  INCISION AND DRAINAGE LEFT THIGH ABSCESS (Left)  confirmed   with allergies and dressings discussed. Anesthesia type, drugs, patient history, complications, estimated blood loss, vital signs, intake and output, and last pain medication, lines and temperature were reviewed.

## 2019-01-15 NOTE — BRIEF OP NOTE
BRIEF OPERATIVE NOTE    Date of Procedure: 1/14/2019   Preoperative Diagnosis:  Left Lateral Thigh Abscess. Postoperative Diagnosis:  Same. Procedure(s):   Drain Left Lateral Thigh Abscess. Surgeon(s) and Role:   Enmanuel Gentile MD - Primary  Surgical Staff:  Circ-1: Karen Maya RN  Surg Asst-1: Carrillo Medinas  Event Time In Time Out   Incision Start 01/14/2019 2038    Incision Close 01/14/2019 2054      Anesthesia: General   Estimated Blood Loss: Approx. 5cc. Specimens:   ID Type Source Tests Collected by Time Destination   1 : left thigh abscess Wound  CULTURE, ANAEROBIC, CULTURE, WOUND W Adia Carolina MD 1/14/2019 2046 Microbiology      Findings: Abscess left lateral thigh. No apparent necrotizing fasciitis. Complications: None.   Implants: * No implants in log *

## 2019-01-15 NOTE — BH NOTES
PSYCHIATRIC PROGRESS NOTE         Patient Name  Rexie Riedel   Date of Birth 1950   [facility] [id number]   Medical Record Number  [id number]      Age  77 y.o. PCP Ellie Watkins MD   Admit date:  1/12/2019    Room Number  [address]  @ [facility]   Date of Service  1/15/2019          E & M PROGRESS NOTE:         HISTORY       CC/HISTORY OF PRESENT ILLNESS/INTERVAL HISTORY:  (reviewed/updated 1/15/2019). \"anxiety\"    Per initial evaluation: The patient, Rexie Riedel, is a 77 y.o. WHITE [place]  male with a past psychiatric history significant for MDD with psychosis who was admitted to the medical floor for the treatment of Cellulitis. Patient reports/evidences the following emotional symptoms:  agitation and anxiety. The above symptoms have been present for 24+ hours. These symptoms are of mild severity. The symptoms are intermittent/ fleeting in nature. Additional symptomatology include anger outbursts . The patient's condition has been precipitated by psychosocial stressors (stress of hospitalization and medical illness).      Patient seen in his room, supine on hospital bed. He corroborates the above narrative, stating that he has been having anxiety since coming to the hospital, specifically with his aftercare planning which he hopes will include a home health aide. Patient reports that he has difficulty ambulating secondary to severe osteoarthritis and lumbar stenosis and his mood has worsened due to the resulting effect on his quality of life. The patient lists his current outpatient psychiatric medication regimen, including Wellbutrin, Cymbalta, and Ativan. Psycho-education provided regarding the black box warning of combined use of opioids and benzodiazpines, patient voices understanding. Patient reports that regarding agitation, he becomes more anxious and irritable when he does not take his Ativan 3 times daily. He is able to provide a pharmacy for a medication reconciliation. Patient presently amenable to starting a safer alternative to his Ativan. Risks and benefits of Buspar discussed. The patient denies SI/HI/PI/AVH. He denies any thoughts of self harm, and is able to voice understanding of his present medical illness and the current treatment plan. The patient is oriented to person, place, time, and situation. He is able to list the past 3 presidents in reverse chronological order and can perform simple calculation. Medication reconciliation from [address] [place] [address] ([phone]) Wellbutrin  QDAY, Duloxetine 60 mg QDAY Lorazepam 1 mg TID PRN. [facility] also [facility] Ritalin, oxycodone, methadone and Lyrica in the past 2 months    1/15- no acute overnight events. Patient tolerated first doses of Buspar, has been otherwise feeling okay. Patient reports having taken 1 Ativan for anxiolysis. Counseled on the need to stay compliant with Buspar upon discharge from hospital and the benefits of tapering and discontinuing Ativan. Voices understanding.         SIDE EFFECTS: (reviewed/updated 1/15/2019)  None reported or admitted to. ALLERGIES:(reviewed/updated 1/15/2019)  Allergies   Allergen Reactions    Amitriptyline Other (comments)     hallucinating    Neurontin [Gabapentin] Other (comments)     insomnia    Percocet [Oxycodone-Acetaminophen] Nausea Only and Anxiety     Pt states he can tolerate small doses at 10 mg a day      MEDICATIONS PRIOR TO ADMISSION:(reviewed/updated 1/15/2019)  Medications Prior to Admission   Medication Sig    traZODone (DESYREL) 50 mg tablet Take 50 mg by mouth nightly as needed for Sleep.  carvedilol (COREG) 25 mg tablet TAKE ONE TABLET BY MOUTH TWICE A DAY WITH MEALS    ONDANSETRON PO Take 4 mg by mouth two (2) times daily as needed.     testosterone cypionate (DEPOTESTOTERONE CYPIONATE) 200 mg/mL injection INJECT 0.5 MILLILITERS ONCE EVERY 7 DAYS (Patient taking differently: INJECT 0.5 MILLILITERS ONCE EVERY 7 DAYS (FRIDAYS))    buPROPion XL (WELLBUTRIN XL) 300 mg XL tablet 450 mg every morning.  VOLTAREN 1 % gel 2 g daily as needed.  methylphenidate (RITALIN) 10 mg tablet Take 10 mg by mouth two (2) times a day. Up to 3 tabs daily    LYRICA 225 mg capsule 225 mg two (2) times a day.  tiZANidine (ZANAFLEX) 2 mg tablet Take 2 mg by mouth daily as needed.  CALCIUM 600 WITH VITAMIN D3 600 mg(1,500mg) -400 unit chew     methadone (DOLOPHINE) 10 mg tablet Take 10 mg by mouth two (2) times a day.  HYDROcodone-acetaminophen (NORCO)  mg tablet Take  by mouth four (4) times daily. 2 tabs in AM, 1 at lunch, 1 at dinner, 1 at bedtime    lisinopril (PRINIVIL, ZESTRIL) 40 mg tablet Take 1 tablet by mouth daily.  polyethylene glycol (MIRALAX) 17 gram/dose powder 17 g four (4) times daily.  LORazepam (ATIVAN) 1 mg tablet Take 1 Tab by mouth every eight (8) hours as needed.  omeprazole (PRILOSEC) 20 mg capsule TAKE ONE CAPSULE BY MOUTH EVERY DAY    DULoxetine (CYMBALTA) 60 mg capsule Take 60 mg by mouth daily. PAST MEDICAL HISTORY: Past medical history from the initial psychiatric evaluation has been reviewed (reviewed/updated 1/15/2019) with no additional updates (I asked patient and no additional past medical history provided).  Past Medical History:   Diagnosis Date    Abscess 1/14/2019    Anemia NEC     Asthma     Chronic pain     Due to spinal stenosis    Constipation     HTN (hypertension)     Hypertension     Hypogonadism male     Other ill-defined conditions(799.89)     spinal stenosis    Psychiatric disorder     major depressive disorder    Sleep trouble     Spinal stenosis     Cervical and Lumbar    Vertebral compression fracture (HCC)      Past Surgical History:   Procedure Laterality Date    HX BACK SURGERY      HX CATARACT REMOVAL Bilateral     HX CERVICAL LAMINECTOMY      HX TONSILLECTOMY        SOCIAL HISTORY: Social history from the initial psychiatric evaluation has been reviewed (reviewed/updated 1/15/2019) with no additional updates (I asked patient and no additional social history provided). Social History     Socioeconomic History    Marital status:      Spouse name: Not on file    Number of children: Not on file    Years of education: Not on file    Highest education level: Not on file   Social Needs    Financial resource strain: Not on file    Food insecurity - worry: Not on file    Food insecurity - inability: Not on file    Transportation needs - medical: Not on file   [employer] needs - non-medical: Not on file   Occupational History    Not on file   Tobacco Use    Smoking status: Never Smoker    Smokeless tobacco: Never Used   Substance and Sexual Activity    Alcohol use: No     Comment: Drank six weeks ago.  Drug use: No    Sexual activity: No   Other Topics Concern    Not on file   Social History Narrative    Not on file      FAMILY HISTORY: Family history from the initial psychiatric evaluation has been reviewed (reviewed/updated 1/15/2019) with no additional updates (I asked patient and no additional family history provided). Family History   Problem Relation Age of Onset    Heart Disease Father     Hypertension Father     Stroke Father        REVIEW OF SYSTEMS: (reviewed/updated 1/15/2019)  Appetite:no change from normal   Sleep: no change   All other Review of Systems:      Negative except leg pain per HPI         [facility] ([facility]):    [facility] [place]INDINGS ARE WITHIN NORMAL LIMITS (WNL) UNLESS OTHERWISE STATED BELOW. Orientation oriented to time, place and person   Vital Signs (BP,Pulse, Temp) See below (reviewed 1/15/2019); vital signs are WNL if not listed below.    Gait and Station Stable/steady, no ataxia   Abnormal Muscular Movements, Tone, and Behavior No EPS, no Tardive Dyskinesia, no abnormal muscular movements; wnl tone   Relations cooperative   General Appearance:  age appropriate   Language No aphasia or dysarthria   Speech:  normal volume and non-pressured   Thought Processes logical, wnl rate of thoughts, good abstract reasoning and computation   Thought Associations within normal limits   Thought Content not internally preoccupied    Suicidal Ideations none   Homicidal Ideations none   Mood:  anxious   Affect:  euthymic   Memory recent  adequate   Memory remote:  adequate   Concentration/Attention:  adequate   Fund of Knowledge average   Insight:  fair   Reliability fair   Judgment:  limited        VITALS:     Patient Vitals for the past 24 hrs:   Temp Pulse Resp BP SpO2   01/15/19 0842 98.2 °F (36.8 °C) -- -- -- --   01/15/19 0301 98 °F (36.7 °C) 80 18 149/74 97 %   01/15/19 0003 -- 76 -- 148/66 --   01/14/19 2300 98 °F (36.7 °C) 76 18 156/70 97 %   01/14/19 2216 98 °F (36.7 °C) 89 18 152/70 97 %   01/14/19 2139 98.2 °F (36.8 °C) 89 18 150/74 97 %   01/14/19 2122 98.1 °F (36.7 °C) 93 19 149/82 95 %   01/14/19 2115 -- 95 14 144/81 95 %   01/14/19 2111 -- 96 16 157/86 96 %   01/14/19 2105 -- 96 15 126/69 92 %   01/14/19 2104 98.4 °F (36.9 °C) 96 15 129/67 92 %   01/14/19 2102 -- -- -- 129/67 --   01/14/19 2007 98.9 °F (37.2 °C) 85 20 157/74 99 %   01/14/19 2002 98.6 °F (37 °C) 88 18 149/70 99 %            DATA     LABORATORY DATA:(reviewed/updated 1/15/2019)  Recent Results (from the past 24 hour(s))   CULTURE, WOUND W GRAM STAIN    Collection Time: 01/14/19  9:15 PM   Result Value Ref Range    Special Requests: NO SPECIAL REQUESTS      GRAM STAIN NO DEFINITE ORGANISM SEEN      Culture result: PENDING    CREATININE    Collection Time: 01/15/19  3:08 AM   Result Value Ref Range    Creatinine 0.77 0.70 - 1.30 MG/DL    GFR est AA >60 >60 ml/min/1.73m2    GFR est non-AA >60 >60 ml/min/1.73m2     No results found for: VALF2, VALAC, VALP, VALPR, DS6, CRBAM, CRBAMP, CARB2, XCRBAM  No results found for: LITHM   RADIOLOGY REPORTS:(reviewed/updated 1/15/2019)  Ct Head Wo Cont    Result Date: 1/12/2019  INDICATION:   multiple falls EXAM:  HEAD CT WITHOUT CONTRAST COMPARISON:  January 17, 2015 TECHNIQUE:  Routine noncontrast axial head CT was performed. Sagittal and coronal reconstructions were generated. CT dose reduction was achieved through use of a standardized protocol tailored for this examination and automatic exposure control for dose modulation. FINDINGS: Ventricles:  Midline, no hydrocephalus. Intracranial Hemorrhage:  None. Brain Parenchyma/Brainstem:  Normal for age. Basal Cisterns:  Normal. Paranasal Sinuses:  Visualized sinuses are clear. Additional Comments:  N/A. IMPRESSION: No acute process. Us Ext [address]    Result Date: 1/14/2019  INDICATION:  left thigh pain, status post purulent drainage, suspect abscess EXAM: US SOFT TISSUE. COMPARISON: None . PROCEDURE: Real-time ultrasonography of the left outer thigh was performed at the site of a palpable abnormality on physical examination. Gray scale imaging and color Doppler were both utilized. FINDINGS: A complex collection appears in the subcutaneous tissues, situated between the subcutaneous fat and the thigh muscle, containing thick echogenic debris which shows motion, in addition to numerous gas bubbles in the deeper portion of the mass. The uppermost extent of this collection courses superficially to the skin surface. Overall, the collection measures 13.6 x 2.6 x 6.6 cm. The surrounding or peripheral rim is hypervascular on color Doppler examination. IMPRESSION: Left thigh abscess containing echogenic debris and gas bubbles. Ilene Romero           MEDICATIONS     ALL MEDICATIONS:   Current Facility-Administered Medications   Medication Dose Route Frequency    Vancomycin Trough- Please collect sample at 0130 on 1/16, 30 minutes prior to 0200 dose  1 Each Other ONCE    methadone (DOLOPHINE) tablet 10 mg  10 mg Oral BID    haloperidol lactate (HALDOL) injection 5 mg  5 mg IntraMUSCular Q4H PRN    piperacillin-tazobactam (ZOSYN) 3.375 g in 0.9% sodium chloride (MBP/ADV) 100 mL  3.375 g IntraVENous Q8H    busPIRone (BUSPAR) tablet 5 mg  5 mg Oral BID    vancomycin (VANCOCIN) 1,250 mg in 0.9% sodium chloride 250 mL IVPB  1,250 mg IntraVENous Q12H    sodium chloride (NS) flush 5-40 mL  5-40 mL IntraVENous Q8H    sodium chloride (NS) flush 5-40 mL  5-40 mL IntraVENous PRN    enoxaparin (LOVENOX) injection 40 mg  40 mg SubCUTAneous Q24H    acetaminophen (TYLENOL) tablet 650 mg  650 mg Oral Q6H PRN    ondansetron (ZOFRAN) injection 4 mg  4 mg IntraVENous Q6H PRN    pregabalin (LYRICA) capsule 225 mg  225 mg Oral BID    LORazepam (ATIVAN) tablet 1 mg  1 mg Oral Q8H PRN    HYDROcodone-acetaminophen (NORCO) 5-325 mg per tablet 2 Tab  2 Tab Oral Q4H PRN    carvedilol (COREG) tablet 25 mg  25 mg Oral BID WITH MEALS    DULoxetine (CYMBALTA) capsule 60 mg  60 mg Oral DAILY    methylphenidate HCl (RITALIN) tablet 10 mg  10 mg Oral BID    polyethylene glycol (MIRALAX) packet 17 g  17 g Oral QID PRN    tiZANidine (ZANAFLEX) tablet 2 mg  2 mg Oral DAILY PRN    buPROPion XL (WELLBUTRIN XL) tablet 450 mg  450 mg Oral 7am    calcium-vitamin D (OS-KRISTINE) 500 mg-200 unit tablet  1 Tab Oral DAILY    lisinopril (PRINIVIL, ZESTRIL) tablet 40 mg  40 mg Oral DAILY    zolpidem (AMBIEN) tablet 10 mg  10 mg Oral QHS PRN      SCHEDULED MEDICATIONS:   Current Facility-Administered Medications   Medication Dose Route Frequency    Vancomycin Trough- Please collect sample at 0130 on 1/16, 30 minutes prior to 0200 dose  1 Each Other ONCE    methadone (DOLOPHINE) tablet 10 mg  10 mg Oral BID    piperacillin-tazobactam (ZOSYN) 3.375 g in 0.9% sodium chloride (MBP/ADV) 100 mL  3.375 g IntraVENous Q8H    busPIRone (BUSPAR) tablet 5 mg  5 mg Oral BID    vancomycin (VANCOCIN) 1,250 mg in 0.9% sodium chloride 250 mL IVPB  1,250 mg IntraVENous Q12H    sodium chloride (NS) flush 5-40 mL  5-40 mL IntraVENous Q8H    enoxaparin (LOVENOX) injection 40 mg 40 mg SubCUTAneous Q24H    pregabalin (LYRICA) capsule 225 mg  225 mg Oral BID    carvedilol (COREG) tablet 25 mg  25 mg Oral BID WITH MEALS    DULoxetine (CYMBALTA) capsule 60 mg  60 mg Oral DAILY    methylphenidate HCl (RITALIN) tablet 10 mg  10 mg Oral BID    buPROPion XL (WELLBUTRIN XL) tablet 450 mg  450 mg Oral 7am    calcium-vitamin D (OS-KRISTINE) 500 mg-200 unit tablet  1 Tab Oral DAILY    lisinopril (PRINIVIL, ZESTRIL) tablet 40 mg  40 mg Oral DAILY            ASSESSMENT & PLAN     The patient, Tati Campbell, is a 77 y.o.  male who presents at this time for treatment of the following diagnoses: (reviewed/updated 1/15/2019)  Patient Active Hospital Problem List:   Agitation    Assessment: Evening agitation likely due to benzodiazapine withdrawal and concomitant axis 2 traits. Patient calm and cooperative on interview, reporting stable depression, and on typical regimen of antidepressant and anxiolytic., He is not displaying any signs or symptoms of psychosis, mood dysregulation or delirium.     Patient is also taking stimulant and opiate pain medication, which confer a significant risk when combined with benzodiazepines. Patient counseled on the risk and benefit of such a regimen, and states that he is open to starting an alternative medication.         Plan:  - CONTINUE Wellbutrin  mg QDAY for MDD  - CONTINUE Cymbalta 60 mg QDAY for MDD, pain adjunct  - CONTINUE Ativan 1 mg Q8H PRN anxiety  - CONTINUE Buspar 5 mg BID for anxiety  - New prescription for Buspar sent electronically to patient's pharmacy to continue upon discharge  - This patient does not require inpatient hospitalization at this time  - Patient can leave the hospital AMA without further psychiatric assessment  - For acute agitation can give Haldol  5 mg PO/IM Q4H PRN  - Patient can follow up with outpatient psych upon discharge       Psychiatry will sign off, please re-consult as needed.        Signed By:   Reece Tyler MD  1/15/2019

## 2019-01-15 NOTE — PROGRESS NOTES
Patient lives alone. Per PT recommendation patient would benefit from a rollator walker. CM to follow-up tomorrow for DME needs.     [address] [facility]  [phone]

## 2019-01-15 NOTE — OP NOTES
[facility]  OPERATIVE REPORT    Indra Ruth  MR#: [id number]  : 1950  ACCOUNT #: [de-identified]   DATE OF SERVICE: 2019    PREOPERATIVE DIAGNOSIS:  Left lateral thigh abscess. POSTOPERATIVE DIAGNOSIS:  Left lateral thigh abscess. PROCEDURE PERFORMED:  Drain left lateral thigh abscess. SURGEON:  Leatha Fitzgerald. Carmen Douglas MD    ANESTHESIA:  General via laryngeal mask airway. ESTIMATED BLOOD LOSS:  Approximately 10 mL. CRYSTALLOID:  450 mL. SPECIMENS REMOVED:  Abscess contents for aerobic and anaerobic culture and sensitivity. DRAINS:  None. COMPLICATIONS:  None. IMPLANTS:  None. INDICATIONS FOR SURGERY:  The patient is a 44-year-old man with a history and physical examination and ultrasound consistent with a left lateral thigh abscess. The patient is brought to the operating room at this time for drainage of the abscess. The risks of the procedure including but not limited to bleeding, further infection and the need for further surgery were discussed in detail with the patient. Mr. Vance Toribio understood and wished to proceed. PROCEDURE IN DETAIL:  After consent was obtained, the patient was brought to the operating room where he was placed in the supine position on the operating room table. Following the induction of an adequate level of general anesthesia via the laryngeal mask airway, the left lateral thigh was prepped with Betadine and draped as a sterile field. It should be noted that the left lateral thigh was edematous and indurated. Furthermore, there was a fluctuant subcutaneous mass consistent with an abscess. Local anesthetic was infiltrated and an incision over the abscess was opened sharply. Subcutaneous bleeders were carefully cauterized. The incision was carried down to the abscess which was readily identified and opened. Purulent material was evacuated. This was sampled and submitted for aerobic and anaerobic culture and sensitivity. Loculations were broken up. There was no evidence of necrotizing fasciitis. Necrotic skin and subcutaneous tissues were sharply debrided back to viable-appearing tissue. The wound was made hemostatic with the Bovie. Following this, the wound was irrigated copiously with saline, inspected and found to be hemostatic. Additional local anesthetic was infiltrated and the wound packed with saline-soaked Madhu. Dry dressings were applied. The patient was awakened from his general anesthetic and the laryngeal mask airway removed. He was transferred to the stretcher and brought to the recovery room in stable condition having tolerated the procedure well. At the conclusion of the procedure, all sponge counts, instrument counts, and needle counts were reported as correct x2.       Ziggy Gonsales MD       [facility] / LN  D: 01/14/2019 21:04     T: 01/14/2019 21:29  JOB #: [id number]  CC: Mallory Moscoso MD  CC: Ha Tyler DO  CC: Herrera Archer MD

## 2019-01-15 NOTE — PROGRESS NOTES
Problem: Self Care Deficits Care Plan (Adult)  Goal: *Acute Goals and Plan of Care (Insert Text)  Occupational Therapy Goals  Initiated 1/15/2019  1. Patient will perform grooming with supervision/set-up in standing >3 minutes within 7 day(s). 2.  Patient will perform lower body dressing with modified independence within 7 day(s). 3.  Patient will perform toilet transfers with modified independence within 7 day(s). 4.  Patient will perform all aspects of toileting with modified independence within 7 day(s). 5.  Patient will participate in upper extremity therapeutic exercise/activities with independence for 5 minutes within 7 day(s). 6.  Patient will utilize energy conservation techniques during functional activities with verbal cues within 7 day(s). Occupational Therapy EVALUATION  Patient: Charles Chicas (89 y.o. male)  Date: 1/15/2019  Primary Diagnosis: Cellulitis  Procedure(s) (LRB):  INCISION AND DRAINAGE LEFT THIGH ABSCESS (Left) 1 Day Post-Op   Precautions:        ASSESSMENT :  Cleared by RN to see pt for therapy session. Based on the objective data described below, the patient presents with back pain, decreased LE strength, decreased endurance and balance, impaired functional mobility and activity tolerance following admission for LLE cellulitis. At baseline pt lives alone and is mod I with ADLs and functional mobility with cane. Reports chronic back pain due to stenosis as well as B knee pain. Over the past few days has had over 10 falls, reports increasing difficulty with performing household IADLs and bathing/bathroom transfers due to LE weakness and fatigue. Received supine in bed, agreeable to participate. Transferred to sitting EOB with mod I, good sitting balance. Stood to DTT and ambulated to bathroom, mildly unsteady but no LOB. Transferred to toilet with CGA using RW and grabbar.  Stood again and ambulated to sink, completed hair brushing in standing with instruction on safe use of RW during standing ADL. Pt reported neck pain and knee pain with mobility. During standing grooming ADL noted pt's legs shaky and he reported legs felt fatigued. Transferred back to [facility] in bed with mod I. Pt interested in rollator to assist with functional mobility due to LE weakness and decreased endurance, as well as increased assistance at home. He will benefit from skilled intervention to address the above impairments. Recommend HH at discharge. Patients rehabilitation potential is considered to be Good  Factors which may influence rehabilitation potential include:   [x]             None noted  []             Mental ability/status  []             Medical condition  []             Home/family situation and support systems  []             Safety awareness  []             Pain tolerance/management  []             Other:      PLAN :  Recommendations and Planned Interventions:  [x]               Self Care Training                  [x]        Therapeutic Activities  [x]               Functional Mobility Training    []        Cognitive Retraining  [x]               Therapeutic Exercises           [x]        Endurance Activities  [x]               Balance Training                   []        Neuromuscular Re-Education  []               Visual/Perceptual Training     [x]   Home Safety Training  [x]               Patient Education                 [x]        Family Training/Education  []               Other (comment):    Frequency/Duration: Patient will be followed by occupational therapy 2 times a week to address goals. Discharge Recommendations: Home Health and increased assistance  Further Equipment Recommendations for Discharge: TBD     SUBJECTIVE:   Patient stated I was falling a lot.     OBJECTIVE DATA SUMMARY:   HISTORY:   Past Medical History:   Diagnosis Date    Abscess 1/14/2019    Anemia NEC     Asthma     Chronic pain     Due to spinal stenosis    Constipation     HTN (hypertension)     Hypertension     Hypogonadism male     Other ill-defined conditions(799.89)     spinal stenosis    Psychiatric disorder     major depressive disorder    Sleep trouble     Spinal stenosis     Cervical and Lumbar    Vertebral compression fracture (HCC)      Past Surgical History:   Procedure Laterality Date    HX BACK SURGERY      HX CATARACT REMOVAL Bilateral     HX CERVICAL LAMINECTOMY      HX TONSILLECTOMY         Prior Level of Function/Environment/Context: At baseline pt lives alone and is mod I with ADLs and functional mobility with cane. Reports chronic back pain due to stenosis as well as B knee pain. Home Situation  Home Environment: Apartment  # Steps to Enter: 0  One/Two Story Residence: One story  Living Alone: Yes  Support Systems: Child(halima)  Patient Expects to be Discharged to[de-identified] Apartment  Current DME Used/Available at Home: Cane, straight, Shower chair  Tub or Shower Type: Tub/Shower combination    Hand dominance: Right    EXAMINATION OF PERFORMANCE DEFICITS:  Cognitive/Behavioral Status:  Neurologic State: Alert  Orientation Level: Oriented X4  Cognition: Follows commands  Perception: Appears intact  Perseveration: No perseveration noted  Safety/Judgement: Awareness of environment; Fall prevention;Home safety      Hearing: Auditory  Auditory Impairment: None    Vision/Perceptual:            Acuity: Able to read clock/calendar on wall without difficulty; Able to read employee name badge without difficulty         Range of Motion:  AROM: Generally decreased, functional  PROM: Generally decreased, functional        Strength:  Strength: Generally decreased, functional          Coordination:  Coordination: Within functional limits  Fine Motor Skills-Upper: Left Intact; Right Intact    Gross Motor Skills-Upper: Left Intact; Right Intact    Tone & Sensation:  Tone: Normal  Sensation: Impaired(B hand and feet numbness at times)          Balance:  Sitting: Intact  Standing: Impaired; With support(RW)  Standing - Static: Good  Standing - Dynamic : Fair    Functional Mobility and Transfers for ADLs:  Bed Mobility:  Supine to Sit: Modified independent  Sit to Supine: Modified independent    Transfers:  Sit to Stand: Stand-by assistance; Additional time; Adaptive equipment(RW)  Stand to Sit: Stand-by assistance  Toilet Transfer : Contact guard assistance; Additional time; Adaptive equipment(RW)    ADL Assessment:  Feeding: Independent    Oral Facial Hygiene/Grooming: Contact guard assistance;Setup(standing)    Bathing: Adaptive equipment; Additional time;Minimum assistance    Upper Body Dressing: Independent    Lower Body Dressing: Contact guard assistance    Toileting: Supervision          ADL Intervention and task modifications:  Feeding  Drink to Mouth: Independent    Grooming  Brushing/Combing Hair: Contact guard assistance(standing at sink)    Lower Body Dressing Assistance  Socks: Supervision/set-up  Leg Crossed Method Used: Yes  Position Performed: Seated edge of bed         Cognitive Retraining  Safety/Judgement: Awareness of environment; Fall prevention;Home safety    Functional Measure:  Barthel Index:    Bathin  Bladder: 10  Bowels: 10  Groomin  Dressin  Feeding: 10  Mobility: 5  Stairs: 0  Toilet Use: 5  Transfer (Bed to Chair and Back): 10  Total: 60        The Barthel ADL Index: Guidelines  1. The index should be used as a record of what a patient does, not as a record of what a patient could do. 2. The main aim is to establish degree of independence from any help, physical or verbal, however minor and for whatever reason. 3. The need for supervision renders the patient not independent. 4. A patient's performance should be established using the best available evidence. Asking the patient, friends/relatives and nurses are the usual sources, but direct observation and common sense are also important. However direct testing is not needed.   5. Usually the patient's performance over the preceding 24-48 hours is important, but occasionally longer periods will be relevant. 6. Middle categories imply that the patient supplies over 50 per cent of the effort. 7. Use of aids to be independent is allowed. Michelle Lema., Barthel, D.W. ([id number]). Functional evaluation: the Barthel Index. [facility] (14)2. Wilmer Nickolas rosalio CHAD Kaplan, Tello Cecy., [facility], [address] [place] [address] (1999). Measuring the change indisability after inpatient rehabilitation; comparison of the responsiveness of the Barthel Index and Functional [place] Measure. Journal of Neurology, Neurosurgery, and Psychiatry, 66(4), 936-253. JOSEPH Briceño, OLIVIA Wu, & Connor Raman MROBERT. (2004.) Assessment of post-stroke quality of life in cost-effectiveness studies: The usefulness of the Barthel Index and the EuroQoL-5D. Quality of Life Research, 15, 859-49       Occupational Therapy Evaluation Charge Determination   History Examination Decision-Making   LOW Complexity : Brief history review  MEDIUM Complexity : 3-5 performance deficits relating to physical, cognitive , or psychosocial skils that result in activity limitations and / or participation restrictions MEDIUM Complexity : Patient may present with comorbidities that affect occupational performnce. Miniml to moderate modification of tasks or assistance (eg, physical or verbal ) with assesment(s) is necessary to enable patient to complete evaluation       Based on the above components, the patient evaluation is determined to be of the following complexity level: LOW   Pain:  Pain Scale 1: Numeric (0 - 10)  Pain Intensity 1: 0              Activity Tolerance:   Good  Please refer to the flowsheet for vital signs taken during this treatment.   After treatment:   [] Patient left in no apparent distress sitting up in chair  [x] Patient left in no apparent distress in bed  [x] Call bell left within reach  [x] Nursing notified  [x] Caregiver present  [] Bed alarm activated    COMMUNICATION/EDUCATION:   The patients plan of care was discussed with: Physical Therapist and Registered Nurse. [x] Home safety education was provided and the patient/caregiver indicated understanding. [x] Patient/family have participated as able in goal setting and plan of care. [x] Patient/family agree to work toward stated goals and plan of care. [] Patient understands intent and goals of therapy, but is neutral about his/her participation. [] Patient is unable to participate in goal setting and plan of care. This patients plan of care is appropriate for delegation to [facility].     Thank you for this referral.  Omaira Diaz OT  Time Calculation: 32 mins

## 2019-01-15 NOTE — PROGRESS NOTES
Problem: Mobility Impaired (Adult and Pediatric)  Goal: *Acute Goals and Plan of Care (Insert Text)  Physical Therapy Goals  Initiated 1/14/2019  1. Patient will move from supine to sit and sit to supine  in bed with modified independence within 7 day(s). 2.  Patient will transfer from bed to chair and chair to bed with supervision/set-up using the least restrictive device within 7 day(s). 3.  Patient will perform sit to stand with supervision/set-up within 7 day(s). 4.  Patient will ambulate with contact guard assist for 200 feet with the least restrictive device within 7 day(s). physical Therapy TREATMENT  Patient: Aamir Vick (18 y.o. male)  Date: 1/15/2019  Diagnosis: Cellulitis Cellulitis  Procedure(s) (LRB):  INCISION AND DRAINAGE LEFT THIGH ABSCESS (Left) 1 Day Post-Op  Precautions:    Chart, physical therapy assessment, plan of care and goals were reviewed. ASSESSMENT:  Patient received supine in bed. Patient reports feeling okay after drainage of left thigh abscess. Patient performed bed mobility with modified independence. Patient stood to walker with stand-by assistance. Patient ambulated 125 feet with walker and contact guard assistance; no chair follow needed this date. Patient educated on taking his time when turning to improve safety. Patient aware of need to avoid flexing forward during ambulation and able to correct on his own today. Patient with no loss of balance during ambulation. Patient would benefit from home health if agreeable and rollator walker due to limited endurance and BLE weakness. Patient is also interested in having home health nursing for a few hours a day to assist with ADLs as well.      Progression toward goals:  [x]    Improving appropriately and progressing toward goals  []    Improving slowly and progressing toward goals  []    Not making progress toward goals and plan of care will be adjusted     PLAN:  Patient continues to benefit from skilled intervention to address the above impairments. Continue treatment per established plan of care. Discharge Recommendations:  Home Health; patient reports that he is interested in home health nursing for a few hours per day as well  Further Equipment Recommendations for Discharge:  Rollator walker     SUBJECTIVE:   Patient stated I'm feeling okay today.     OBJECTIVE DATA SUMMARY:   Critical Behavior:  Neurologic State: Alert  Orientation Level: Oriented X4  Cognition: Follows commands  Safety/Judgement: Awareness of environment, Fall prevention, Home safety     Functional Mobility Training:  Bed Mobility:  Supine to Sit: Modified independent  Sit to Supine: Modified independent  Scooting: Modified independent     Transfers:  Sit to Stand: Stand-by assistance; Additional time  Stand to Sit: Stand-by assistance        Balance:  Sitting: Intact  Standing: Impaired; With support  Standing - Static: Good  Standing - Dynamic : Fair     Ambulation/Gait Training:  Distance (ft): 125 Feet (ft)  Assistive Device: Gait belt;Walker, rolling  Ambulation - Level of Assistance: Contact guard assistance  Gait Abnormalities: Decreased step clearance  Base of Support: Narrowed  Speed/Renea: Pace decreased (<100 feet/min)  Step Length: Right shortened;Left shortened      Pain:  Pain Scale 1: Numeric (0 - 10)  Pain Intensity 1: 0     Activity Tolerance:   Fair  Please refer to the flowsheet for vital signs taken during this treatment.   After treatment:   []    Patient left in no apparent distress sitting up in chair  [x]    Patient left in no apparent distress in bed  [x]    Call bell left within reach  [x]    Nursing notified  []    Caregiver present  [x]    Bed alarm activated    COMMUNICATION/COLLABORATION:   The patients plan of care was discussed with: Registered Nurse    Noah Urbina, PT   Time Calculation: 15 mins

## 2019-01-15 NOTE — PROGRESS NOTES
Hospitalist Progress Note    Subjective:   Daily Progress Note: 1/15/2019 11:45 AM    Less pain in leg,  Nurses reports some bleeding from wound site.     Current Facility-Administered Medications   Medication Dose Route Frequency    Vancomycin Trough- Please collect sample at 0130 on 1/16, 30 minutes prior to 0200 dose  1 Each Other ONCE    methadone (DOLOPHINE) tablet 10 mg  10 mg Oral BID    haloperidol lactate (HALDOL) injection 5 mg  5 mg IntraMUSCular Q4H PRN    piperacillin-tazobactam (ZOSYN) 3.375 g in 0.9% sodium chloride (MBP/ADV) 100 mL  3.375 g IntraVENous Q8H    busPIRone (BUSPAR) tablet 5 mg  5 mg Oral BID    vancomycin (VANCOCIN) 1,250 mg in 0.9% sodium chloride 250 mL IVPB  1,250 mg IntraVENous Q12H    0.9% sodium chloride infusion  75 mL/hr IntraVENous CONTINUOUS    sodium chloride (NS) flush 5-40 mL  5-40 mL IntraVENous Q8H    sodium chloride (NS) flush 5-40 mL  5-40 mL IntraVENous PRN    enoxaparin (LOVENOX) injection 40 mg  40 mg SubCUTAneous Q24H    acetaminophen (TYLENOL) tablet 650 mg  650 mg Oral Q6H PRN    ondansetron (ZOFRAN) injection 4 mg  4 mg IntraVENous Q6H PRN    pregabalin (LYRICA) capsule 225 mg  225 mg Oral BID    LORazepam (ATIVAN) tablet 1 mg  1 mg Oral Q8H PRN    HYDROcodone-acetaminophen (NORCO) 5-325 mg per tablet 2 Tab  2 Tab Oral Q4H PRN    carvedilol (COREG) tablet 25 mg  25 mg Oral BID WITH MEALS    DULoxetine (CYMBALTA) capsule 60 mg  60 mg Oral DAILY    methylphenidate HCl (RITALIN) tablet 10 mg  10 mg Oral BID    polyethylene glycol (MIRALAX) packet 17 g  17 g Oral QID PRN    tiZANidine (ZANAFLEX) tablet 2 mg  2 mg Oral DAILY PRN    buPROPion XL (WELLBUTRIN XL) tablet 450 mg  450 mg Oral 7am    calcium-vitamin D (OS-KRISTINE) 500 mg-200 unit tablet  1 Tab Oral DAILY    lisinopril (PRINIVIL, ZESTRIL) tablet 40 mg  40 mg Oral DAILY    zolpidem (AMBIEN) tablet 10 mg  10 mg Oral QHS PRN        Review of Systems  A comprehensive review of systems was negative except for that written in the HPI. Objective:     Visit Vitals  /74   Pulse 80   Temp 98.2 °F (36.8 °C)   Resp 18   Ht 6' 7\" (2.007 m)   Wt 99.8 kg (220 lb)   SpO2 97%   BMI 24.78 kg/m²      O2 Device: Room air    Temp (24hrs), Av.2 °F (36.8 °C), Min:97.6 °F (36.4 °C), Max:98.9 °F (37.2 °C)      01/15 0701 - 01/15 1900  In: -   Out: 400 [Urine:400]  1901 - 01/15 0700  In: 0068 [P.O.:240; I.V.:3235]  Out: 1170 [Urine:1150]    Visit Vitals  /74   Pulse 80   Temp 98.2 °F (36.8 °C)   Resp 18   Ht 6' 7\" (2.007 m)   Wt 99.8 kg (220 lb)   SpO2 97%   BMI 24.78 kg/m²     General:  Alert, cooperative, no distress, appears stated age. Head:  Normocephalic, without obvious abnormality, atraumatic. Eyes:  Conjunctivae/corneas clear. PERRL, EOMs intact. Fundi benign   Ears:  Normal TMs and external ear canals both ears. Nose: Nares normal. Septum midline. Mucosa normal. No drainage or sinus tenderness. Throat: Lips, mucosa, and tongue normal. Teeth and gums normal.   Neck: Supple, symmetrical, trachea midline, no adenopathy, thyroid: no enlargement/tenderness/nodules, no carotid bruit and no JVD. Back:   Symmetric, no curvature. ROM normal. No CVA tenderness. Lungs:   Clear to auscultation bilaterally. Chest wall:  No tenderness or deformity. Heart:  Regular rate and rhythm, S1, S2 normal, no murmur, click, rub or gallop. Abdomen:   Soft, non-tender. Bowel sounds normal. No masses,  No organomegaly. Genitalia:  Normal male without lesion, discharge or tenderness. Rectal:  Normal tone, normal prostate, no masses or tenderness  Guaiac negative stool. Extremities: Extremities normal, atraumatic, no cyanosis or edema. Pulses: 2+ and symmetric all extremities. Skin: Skin color, texture, turgor normal. No rashes or lesions   Lymph nodes: Cervical, supraclavicular, and axillary nodes normal.   Neurologic: CNII-XII intact. Normal strength, sensation and reflexes throughout. Additional comments: wound culture ngtd    Data Review    Recent Results (from the past 24 hour(s))   CULTURE, WOUND W GRAM STAIN    Collection Time: 01/14/19  9:15 PM   Result Value Ref Range    Special Requests: NO SPECIAL REQUESTS      GRAM STAIN NO DEFINITE ORGANISM SEEN      Culture result: PENDING    CREATININE    Collection Time: 01/15/19  3:08 AM   Result Value Ref Range    Creatinine 0.77 0.70 - 1.30 MG/DL    GFR est AA >60 >60 ml/min/1.73m2    GFR est non-AA >60 >60 ml/min/1.73m2         Assessment/Plan:     Principal Problem:    Cellulitis (1/13/2019)    Active Problems:    Abscess (1/14/2019)    now with abscess formation - sp surgical I and d. Cont with local care. No wound vac at this time over conerns that it may cause excessive bleeding. Monitor hh. Cont vanc and zosyn and fu wound culture. Cont inpt wound care.     djd back and unstable gait - ptot     Agitation overnight - better today. May be related to leg abscess    Hx of asthma/anemia/htn - cont coreg, prinivil.  duonebs prn.             Care Plan discussed with: Patient/Family    Total time spent with patient: 30 minutes.     Signed By: Dallin Marquez DO     January 15, 2019

## 2019-01-15 NOTE — PERIOP NOTES
TRANSFER - IN REPORT:    Verbal report received from Bimal Ramos, Count includes the [facility] on [employer]  being received from PCU 4 for ordered procedure      Report consisted of patients Situation, Background, Assessment and   Recommendations(SBAR). Information from the following report(s) SBAR, Kardex, Intake/Output and MAR was reviewed with the receiving nurse. Opportunity for questions and clarification was provided. Assessment completed upon patients arrival to unit and care assumed.

## 2019-01-15 NOTE — ANESTHESIA PREPROCEDURE EVALUATION
Anesthetic History   No history of anesthetic complications            Review of Systems / Medical History  Patient summary reviewed and pertinent labs reviewed    Pulmonary            Asthma        Neuro/Psych         Psychiatric history     Cardiovascular    Hypertension              Exercise tolerance: <4 METS     GI/Hepatic/Renal     GERD           Endo/Other  Within defined limits           Other Findings              Physical Exam    Airway  Mallampati: III  TM Distance: 4 - 6 cm  Neck ROM: decreased range of motion        Cardiovascular    Rhythm: regular  Rate: normal         Dental    Dentition: Upper dentition intact and Lower dentition intact     Pulmonary  Breath sounds clear to auscultation               Abdominal  GI exam deferred       Other Findings            Anesthetic Plan    ASA: 3  Anesthesia type: general          Induction: Intravenous  Anesthetic plan and risks discussed with: Patient

## 2019-01-15 NOTE — PERIOP NOTES
TRANSFER - OUT REPORT:    Verbal report given to Imelda Jacobo RN on [employer]  being transferred to PCU 4 for routine post - op       Report consisted of patients Situation, Background, Assessment and   Recommendations(SBAR). Information from the following report(s) SBAR, Kardex, OR Summary, Intake/Output and MAR was reviewed with the receiving nurse. Opportunity for questions and clarification was provided.       Patient transported with:   Registered Nurse

## 2019-01-15 NOTE — WOUND CARE
Wound consult for wound VAC post I&D. Removed the tape wound pack intact, bleeding. Placed a surgi cell over it to for hemostat. Informed DR Kate Rosas and Kika Comment his RN. Placed folded stephanie for pressure and applied stephanie and medi pore tape. Recommendation: To call DR Arianna Mcnulty to reevaluate the wound if it needed any further action. Not advisable to place wound VAC on this wound as it is bleeding now.

## 2019-01-15 NOTE — PROGRESS NOTES
TRANSFER - IN REPORT:    [facility] for routine post - op      Report consisted of patients Situation, Background, Assessment and   Recommendations(SBAR). Information from the following report(s) SBAR, Kardex, OR Summary, Procedure Summary, MAR and Recent Results was reviewed with the receiving nurse. Opportunity for questions and clarification was provided. Assessment completed upon patients arrival to unit and care assumed.

## 2019-01-15 NOTE — PROGRESS NOTES
Mr. Rajwinder Guerrero tells me that his left leg is feeling better today. Tm 99.2 HR: 80 BP: 149/74 Resp Rate: 18 97% sat on room air. Intake/Output Summary (Last 24 hours) at 1/15/2019 1232  Last data filed at 1/15/2019 0751  Gross per 24 hour   Intake 690 ml   Output 970 ml   Net -280 ml   Exam: Cor: RRR. Lungs: Bilateral breath sounds. Clear to auscultation. Abd: Soft. Non tender. LLE: Lateral thigh wound is clean and beginning to granulate. Edema and induration improved. Labs:   Recent Results (from the past 12 hour(s))   CREATININE    Collection Time: 01/15/19  3:08 AM   Result Value Ref Range    Creatinine 0.77 0.70 - 1.30 MG/DL    GFR est AA >60 >60 ml/min/1.73m2    GFR est non-AA >60 >60 ml/min/1.73m2   Wound repacked with saline soaked stephanie. Reviewed operative findings with Mr. Rajwinder Guerrero today. Continue local wound care. Hopefully can have wound vac placed at some point. Diet as tolerated. Saline lock IVF. IV abx - Zosyn and Vancomycin as ordered. OR cultures pending. Plans per Dr. Estela Hahn. Following.

## 2019-01-15 NOTE — PROGRESS NOTES
Date of Surgery Update:  Kennon Mortimer was seen and examined. History and physical has been reviewed. The patient has been examined. There have been no significant clinical changes since the completion of the originally dated History and Physical.  Patient identified by surgeon; surgical site was confirmed by patient and surgeon. Signed By: Vanna Shrestha MD     January 14, 2019 8:13 PM         Please note from the office and include the additional information below:    Past Medical History  Past Medical History:   Diagnosis Date    Abscess 1/14/2019    Anemia NEC     Asthma     Chronic pain     Due to spinal stenosis    Constipation     HTN (hypertension)     Hypertension     Hypogonadism male     Other ill-defined conditions(799.89)     spinal stenosis    Psychiatric disorder     major depressive disorder    Sleep trouble     Spinal stenosis     Cervical and Lumbar    Vertebral compression fracture (HCC)         Past Surgical History  Past Surgical History:   Procedure Laterality Date    HX BACK SURGERY      HX CATARACT REMOVAL Bilateral     HX CERVICAL LAMINECTOMY      HX TONSILLECTOMY          Social History  The patient Kennon Mortimer  reports that  has never smoked. he has never used smokeless tobacco. He reports that he does not drink alcohol or use drugs.      Family History  Family History   Problem Relation Age of Onset    Heart Disease Father     Hypertension Father     Stroke Father

## 2019-01-16 LAB
ANION GAP SERPL CALC-SCNC: 6 MMOL/L (ref 5–15)
BUN SERPL-MCNC: 16 MG/DL (ref 6–20)
BUN/CREAT SERPL: 20 (ref 12–20)
CALCIUM SERPL-MCNC: 8.4 MG/DL (ref 8.5–10.1)
CHLORIDE SERPL-SCNC: 103 MMOL/L (ref 97–108)
CO2 SERPL-SCNC: 30 MMOL/L (ref 21–32)
CREAT SERPL-MCNC: 0.81 MG/DL (ref 0.7–1.3)
CREAT SERPL-MCNC: 0.87 MG/DL (ref 0.7–1.3)
DATE LAST DOSE: ABNORMAL
ERYTHROCYTE [DISTWIDTH] IN BLOOD BY AUTOMATED COUNT: 14.1 % (ref 11.5–14.5)
GLUCOSE SERPL-MCNC: 80 MG/DL (ref 65–100)
HCT VFR BLD AUTO: 33.8 % (ref 36.6–50.3)
HGB BLD-MCNC: 11.1 G/DL (ref 12.1–17)
MCH RBC QN AUTO: 30.4 PG (ref 26–34)
MCHC RBC AUTO-ENTMCNC: 32.8 G/DL (ref 30–36.5)
MCV RBC AUTO: 92.6 FL (ref 80–99)
NRBC # BLD: 0 K/UL (ref 0–0.01)
NRBC BLD-RTO: 0 PER 100 WBC
PLATELET # BLD AUTO: 301 K/UL (ref 150–400)
PMV BLD AUTO: 10.3 FL (ref 8.9–12.9)
POTASSIUM SERPL-SCNC: 3.7 MMOL/L (ref 3.5–5.1)
RBC # BLD AUTO: 3.65 M/UL (ref 4.1–5.7)
REPORTED DOSE,DOSE: ABNORMAL UNITS
REPORTED DOSE/TIME,TMG: ABNORMAL
SODIUM SERPL-SCNC: 139 MMOL/L (ref 136–145)
VANCOMYCIN SERPL-MCNC: 27.7 UG/ML
VANCOMYCIN TROUGH SERPL-MCNC: 11.8 UG/ML (ref 5–10)
WBC # BLD AUTO: 11.7 K/UL (ref 4.1–11.1)

## 2019-01-16 PROCEDURE — 74011250636 HC RX REV CODE- 250/636: Performed by: NEUROMUSCULOSKELETAL MEDICINE & OMM

## 2019-01-16 PROCEDURE — 99218 HC RM OBSERVATION: CPT

## 2019-01-16 PROCEDURE — 74011250637 HC RX REV CODE- 250/637: Performed by: PSYCHIATRY & NEUROLOGY

## 2019-01-16 PROCEDURE — 96366 THER/PROPH/DIAG IV INF ADDON: CPT

## 2019-01-16 PROCEDURE — 36415 COLL VENOUS BLD VENIPUNCTURE: CPT

## 2019-01-16 PROCEDURE — 96376 TX/PRO/DX INJ SAME DRUG ADON: CPT

## 2019-01-16 PROCEDURE — 74011000258 HC RX REV CODE- 258: Performed by: NEUROMUSCULOSKELETAL MEDICINE & OMM

## 2019-01-16 PROCEDURE — 96372 THER/PROPH/DIAG INJ SC/IM: CPT

## 2019-01-16 PROCEDURE — G0378 HOSPITAL OBSERVATION PER HR: HCPCS

## 2019-01-16 PROCEDURE — 96375 TX/PRO/DX INJ NEW DRUG ADDON: CPT

## 2019-01-16 PROCEDURE — 74011250637 HC RX REV CODE- 250/637: Performed by: NEUROMUSCULOSKELETAL MEDICINE & OMM

## 2019-01-16 PROCEDURE — 85027 COMPLETE CBC AUTOMATED: CPT

## 2019-01-16 PROCEDURE — 80202 ASSAY OF VANCOMYCIN: CPT

## 2019-01-16 PROCEDURE — 97602 WOUND(S) CARE NON-SELECTIVE: CPT

## 2019-01-16 PROCEDURE — 74011250636 HC RX REV CODE- 250/636: Performed by: HOSPITALIST

## 2019-01-16 PROCEDURE — 80048 BASIC METABOLIC PNL TOTAL CA: CPT

## 2019-01-16 RX ORDER — METHYLPHENIDATE HYDROCHLORIDE 5 MG/1
10 TABLET ORAL DAILY
Status: DISCONTINUED | OUTPATIENT
Start: 2019-01-17 | End: 2019-01-24 | Stop reason: HOSPADM

## 2019-01-16 RX ADMIN — HYDROCODONE BITARTRATE AND ACETAMINOPHEN 2 TABLET: 5; 325 TABLET ORAL at 00:40

## 2019-01-16 RX ADMIN — HYDROCODONE BITARTRATE AND ACETAMINOPHEN 2 TABLET: 5; 325 TABLET ORAL at 18:34

## 2019-01-16 RX ADMIN — BUSPIRONE HYDROCHLORIDE 5 MG: 5 TABLET ORAL at 09:44

## 2019-01-16 RX ADMIN — ONDANSETRON 4 MG: 2 SOLUTION INTRAMUSCULAR; INTRAVENOUS at 22:00

## 2019-01-16 RX ADMIN — PREGABALIN 225 MG: 75 CAPSULE ORAL at 09:44

## 2019-01-16 RX ADMIN — LORAZEPAM 1 MG: 1 TABLET ORAL at 19:55

## 2019-01-16 RX ADMIN — VANCOMYCIN HYDROCHLORIDE 1250 MG: 1 INJECTION, POWDER, LYOPHILIZED, FOR SOLUTION INTRAVENOUS at 11:18

## 2019-01-16 RX ADMIN — DULOXETINE HYDROCHLORIDE 60 MG: 30 CAPSULE, DELAYED RELEASE ORAL at 09:44

## 2019-01-16 RX ADMIN — Medication 10 ML: at 22:00

## 2019-01-16 RX ADMIN — LISINOPRIL 40 MG: 20 TABLET ORAL at 09:44

## 2019-01-16 RX ADMIN — CARVEDILOL 25 MG: 12.5 TABLET, FILM COATED ORAL at 08:02

## 2019-01-16 RX ADMIN — METHADONE HYDROCHLORIDE 10 MG: 10 TABLET ORAL at 22:00

## 2019-01-16 RX ADMIN — HYDROCODONE BITARTRATE AND ACETAMINOPHEN 2 TABLET: 5; 325 TABLET ORAL at 11:10

## 2019-01-16 RX ADMIN — BUPROPION HYDROCHLORIDE 450 MG: 150 TABLET, EXTENDED RELEASE ORAL at 08:02

## 2019-01-16 RX ADMIN — HYDROCODONE BITARTRATE AND ACETAMINOPHEN 2 TABLET: 5; 325 TABLET ORAL at 06:12

## 2019-01-16 RX ADMIN — PIPERACILLIN SODIUM AND TAZOBACTAM SODIUM 3.38 G: 3; .375 INJECTION, POWDER, LYOPHILIZED, FOR SOLUTION INTRAVENOUS at 14:05

## 2019-01-16 RX ADMIN — METHADONE HYDROCHLORIDE 10 MG: 10 TABLET ORAL at 09:44

## 2019-01-16 RX ADMIN — ONDANSETRON 4 MG: 2 SOLUTION INTRAMUSCULAR; INTRAVENOUS at 11:39

## 2019-01-16 RX ADMIN — ENOXAPARIN SODIUM 40 MG: 40 INJECTION, SOLUTION INTRAVENOUS; SUBCUTANEOUS at 09:44

## 2019-01-16 RX ADMIN — PREGABALIN 225 MG: 75 CAPSULE ORAL at 18:34

## 2019-01-16 RX ADMIN — METHYLPHENIDATE HYDROCHLORIDE 5 MG: 5 TABLET ORAL at 11:40

## 2019-01-16 RX ADMIN — BUSPIRONE HYDROCHLORIDE 5 MG: 5 TABLET ORAL at 18:35

## 2019-01-16 RX ADMIN — Medication 10 ML: at 06:10

## 2019-01-16 RX ADMIN — PIPERACILLIN SODIUM AND TAZOBACTAM SODIUM 3.38 G: 3; .375 INJECTION, POWDER, LYOPHILIZED, FOR SOLUTION INTRAVENOUS at 21:59

## 2019-01-16 RX ADMIN — PIPERACILLIN SODIUM AND TAZOBACTAM SODIUM 3.38 G: 3; .375 INJECTION, POWDER, LYOPHILIZED, FOR SOLUTION INTRAVENOUS at 06:09

## 2019-01-16 RX ADMIN — CARVEDILOL 25 MG: 12.5 TABLET, FILM COATED ORAL at 18:35

## 2019-01-16 RX ADMIN — CALCIUM CARBONATE-VITAMIN D TAB 500 MG-200 UNIT 1 TABLET: 500-200 TAB at 09:44

## 2019-01-16 NOTE — WOUND CARE
Wound care revisited the wound today. Removed the packing and clean the wound with NS. Applied a wet stephanie in the wound rt now and secured it with stephanie dressing. Ordered wound VAC waiting for the wound VAC get here. As soon as the wound VAC arrives will place hin on the VAC.

## 2019-01-16 NOTE — PROGRESS NOTES
RN and wound care are in with patient CM will follow-up for initial assessment. 1415: Per RN patient has a wound Vac at this time. CM consult BS  for wound vac care at home. Per [facility] must  have a caregiver that is willing to learn wound care and assist.  Need specific wound care orders to be in connect care. CM to confirm living arrangement, working on discharge planning for wound vac.  application for the wound vac to be submitted for approval.     CM to follow-up with patient tomorrow in reference to discharge planning.     Maninder Campos [facility] CM

## 2019-01-16 NOTE — PROGRESS NOTES
Request from nursing and Tan Sepulveda. Patient had concerns regarding his apartment. He states he has been there 10 -or  11 years. He lives alone. He states  His apartment is in disarray. He left a pot of beans on the stove ( he cut the stove off) but concerned about the smell if left out. He said it is the [facility]- [facility]. Talked to the Answering Service. Left just my contact # for a call back in the am.     No one has a key to his apartment. Asked about family. He has two son- they are not locally - he indicates they cannot help him. He indicates he has no friends locally to help him. He was going to [facility] until recently. When he changed to Dual Plan ( Medicare Advantage and Medicaid)- Changed do to needing Dental Care before he can proceed with surgery. He indicates he was referred to [facility] for care and they were going to charge him $ 4,000.00. He did not have the funds. He states he talked to the  at this insurance company  Joe Olivera- Did not have her # will need to call the Service Coordination line  [phone]. To contact her. He indicates he was told he could have some one come in to help him. Discussed possible option. Home care under [address] ( wound care needs will impact whether a safe plan could be made). Discussed Medicaid Personal Care.     MsMakenna Inez Li will follow-up with him tomorrow and care team.

## 2019-01-16 NOTE — WOUND CARE
Wound VAC applied to Lt Thigh post  I&D wound. Wound cleansed with NS dried gently with 4x4. Placed a small piece of adaptic to the base of the wound and placed a piece of white foam applied draped the foam and trac pad connected and connected to wound VAC at 125 mmhg continuous. VAC has a good seal and patient tolerated the procedure well. Will change the VAC in 72 hours.

## 2019-01-16 NOTE — FACE TO FACE
Home Care Face to Face Encounter    Patients Name: Joanna Rucker    YOB: 1950    Primary Diagnosis: leg abscess, djd of back    Date of Face to Face:   1/16/2019                                  Face to Face Encounter findings are related to primary reason for home care:   yes. 1. I certify that the patient needs intermittent care as follows: ptot / rollator for leg abscess and djd of back    2. I certify that this patient is homebound, that is: 1) patient requires the use of a rollator walker device, special transportation, or assistance of another to leave the home; or 2) patient's condition makes leaving the home medically contraindicated; and 3) patient has a normal inability to leave the home and leaving the home requires considerable and taxing effort. Patient may leave the home for infrequent and short duration for medical reasons, and occasional absences for non-medical reasons. Homebound status is due to the following functional limitations: Patient's ambulation limited secondary to severe pain and requires the use of an assistive device and the assistance of a caregiver for safe completion. Patient with strength and ROM deficits limiting ambulation endurance requiring the use of an assistive device and the assistance of a caregiver. Patient deemed temporarily homebound secondary to increased risk for infection when leaving home and going out into the community. 3. I certify that this patient is under my care and that I, or a nurse practitioner or  [id number], or clinical nurse specialist, or certified nurse midwife, working with me, had a Face-to-Face Encounter that meets the physician Face-to-Face Encounter requirements.   The following are the clinical findings from the [address] [place] [address] encounter that support the need for skilled services and is a summary of the encounter: difficulty ambulating 2/2 leg abscess and djd of back    See attached progess note      Gera DUMONT [address] [facility], DO  1/16/2019

## 2019-01-16 NOTE — PROGRESS NOTES
Bedside verbal report given to King agrawal RN. Pt resting in bed, watching TV. Call bell remains next to pt.

## 2019-01-16 NOTE — PROGRESS NOTES
Initial Nutrition Assessment:    INTERVENTIONS/RECOMMENDATIONS:   ·  diet as tolerated  ·  high marline high protein for nutrients and wound healing  · Supplements: Commercial supplement  ·  Ensure Enlive lunch trays  ·  Vit C 500 mg/day  ·     ASSESSMENT:   Pt admitted with cellulitis, abscess L leg, s/p I&D, reports less pain. No wound vac yet, concern that it might increase bleeding at this point. Tolerating diet with no concerns. .  Hx notable for constipation, HTN, dyslipidemia. Diet Order: Regular  % Eaten:  No data found. Pertinent Medications: [x]Reviewed []Other  Pertinent Labs: [x]Reviewed []Other  Food Allergies: []None []Other   Last BM:    [x]Active     []Hyperactive  []Hypoactive       [] Absent BS  Skin:    [] Intact   [] Incision  [x] Breakdown  [] Other:    Anthropometrics:   Height: 6' 7\" (200.7 cm) Weight: 99.8 kg (220 lb)   IBW (%IBW): 99.8 kg (220 lb) (100 %) UBW (%UBW):   (  %)   Last Weight Metrics:  Weight Loss Metrics 1/16/2019 6/28/2018 9/8/2017 1/31/2017 6/21/2016 12/22/2015 3/10/2015   Today's Wt 220 lb 222 lb 223 lb 223 lb 225 lb 231 lb 227 lb   BMI 24.78 kg/m2 25.01 kg/m2 25.12 kg/m2 25.12 kg/m2 25.34 kg/m2 26.01 kg/m2 25.56 kg/m2       BMI: Body mass index is 24.78 kg/m². This BMI is indicative of:   []Underweight    [x]Normal    []Overweight    [] Obesity   [] Extreme Obesity (BMI>40)     Estimated Nutrition Needs (Based on):   2500 Kcals/day(2205 x 1.2 AF) , 93.75 g(1 g/kg) Protein  Carbohydrate:  At Least 130 g/day  Fluids: 2500 mL/day (1ml/kcal)    NUTRITION DIAGNOSES:   Problem:  Altered nutrition-related lab values      Etiology: related to poor diet, poor compliance     Signs/Symptoms: as evidenced by labs, wound      NUTRITION INTERVENTIONS:      Supplements: Commercial supplement              GOAL:   PO intake > 75% most meals follow up 4-6 days    LEARNING NEEDS (Diet, Food/Nutrient-Drug Interaction):    [x] None Identified   [] Identified and Education Provided/Documented   [] Identified and Pt declined/was not appropriate     Cultureal, Confucianist, OR Ethnic Dietary Needs:    [x] None Identified   [] Identified and Addressed     [x] Interdisciplinary Care Plan Reviewed/Documented    [x] Discharge Planning:   Healthful diet with supplements    MONITORING /EVALUATION:   Behavioral-Environmental Outcomes: Readiness to change  Food/Nutrient Intake Outcomes:  Total energy intake, Oral fluids amount  Physical Signs/Symptoms Outcomes: Electrolyte and renal profile    NUTRITION RISK:    [x] Patient At Nutritional Risk mild   [] Patient Not At Nutritional Risk    PT SEEN FOR:    []  MD Consult: []Calorie Count      []Diabetic Diet Education        []Diet Education     []Electrolyte Management     [x]General Nutrition Management and Supplements     []Management of Tube Feeding     []TPN Recommendations    [x]  RN Referral:  []MST score >=2     []Enteral/Parenteral Nutrition PTA     []Pregnant: Gestational DM or Multigestation     []Pressure Ulcer/Wound Care needs        []  Low BMI  []  STEVE Sanz, PhD, [address]   Pager [phone]

## 2019-01-16 NOTE — PROGRESS NOTES
Problem: Pressure Injury - Risk of  Goal: *Prevention of pressure injury  Document Romel Scale and appropriate interventions in the flowsheet. Outcome: Progressing Towards Goal  Pressure Injury Interventions:  Sensory Interventions: Assess changes in LOC    Moisture Interventions: Apply protective barrier, creams and emollients    Activity Interventions: PT/OT evaluation    Mobility Interventions: HOB 30 degrees or less    Nutrition Interventions: Offer support with meals,snacks and hydration    Friction and Shear Interventions: Apply protective barrier, creams and emollients               Problem: Falls - Risk of  Goal: *Absence of Falls  Document Luther Fall Risk and appropriate interventions in the flowsheet.   Outcome: Progressing Towards Goal  Fall Risk Interventions:  Mobility Interventions: Bed/chair exit alarm    Mentation Interventions: Adequate sleep, hydration, pain control    Medication Interventions: Patient to call before getting OOB    Elimination Interventions: Patient to call for help with toileting needs    History of Falls Interventions: Bed/chair exit alarm, Consult care management for discharge planning, Door open when patient unattended

## 2019-01-16 NOTE — PROGRESS NOTES
[facility] Dosing Services: Antimicrobial Stewardship Daily Doc    Consult for antibiotic dosing of vancomycin by Dr. Yamilex Parra  Indication: SSTI w/abscess  Day of Therapy 3    Ht Readings from Last 1 Encounters:   01/16/19 200.7 cm (79\")        Wt Readings from Last 1 Encounters:   01/16/19 99.8 kg (220 lb)        Vancomycin therapy:  Current maintenance dose: 1250 (mg) every 12 hours. Dose calculated to approximate a therapeutic trough of ~15 mcg/mL. Last random level 11.8 mcg/ml, unable to assess this value as it is not clear if the prior dose was given at 1400 or 1900 yesterday  Plan for level / Adjustment in Therapy:afebrile, WBC slightly decreased, SCr overall slightly decreased; will collect a random level & reassess  Dose administration notes:   Doses not given as scheduled      Other Antimicrobial   (not dosed by pharmacist) Zosyn 3.375G IV Q8H (day 2 of 5)   Cultures 1/14 Wound: possible GPC in thio broth  1/14 Anaerobic: N/A   Serum Creatinine Lab Results   Component Value Date/Time    Creatinine 0.87 01/16/2019 06:22 AM    Creatinine 0.81 01/16/2019 06:22 AM    Creatinine (POC) 0.8 04/28/2017 12:48 PM         Creatinine Clearance Estimated Creatinine Clearance: 107.7 mL/min (based on SCr of 0.87 mg/dL).      Temp Temp: 98.7 °F (37.1 °C)       WBC Lab Results   Component Value Date/Time    WBC 11.7 (H) 01/16/2019 06:22 AM        H/H Lab Results   Component Value Date/Time    HGB 11.1 (L) 01/16/2019 06:22 AM        Platelets    Lab Results   Component Value Date/Time    PLATELET 191 47/64/4032 06:22 AM            Pharmacist   Renate Snellen, PHARMD, BCPS  Contact: [phone]

## 2019-01-16 NOTE — PROGRESS NOTES
The documentation for this period 2611-6801 is being entered following the guidelines as defined in the [address] downtime policy by [place].

## 2019-01-17 LAB — CREAT SERPL-MCNC: 0.4 MG/DL (ref 0.7–1.3)

## 2019-01-17 PROCEDURE — 74011000258 HC RX REV CODE- 258: Performed by: NEUROMUSCULOSKELETAL MEDICINE & OMM

## 2019-01-17 PROCEDURE — 82565 ASSAY OF CREATININE: CPT

## 2019-01-17 PROCEDURE — 99218 HC RM OBSERVATION: CPT

## 2019-01-17 PROCEDURE — 96372 THER/PROPH/DIAG INJ SC/IM: CPT

## 2019-01-17 PROCEDURE — 96376 TX/PRO/DX INJ SAME DRUG ADON: CPT

## 2019-01-17 PROCEDURE — 74011250636 HC RX REV CODE- 250/636: Performed by: NEUROMUSCULOSKELETAL MEDICINE & OMM

## 2019-01-17 PROCEDURE — 74011250637 HC RX REV CODE- 250/637: Performed by: NEUROMUSCULOSKELETAL MEDICINE & OMM

## 2019-01-17 PROCEDURE — 96366 THER/PROPH/DIAG IV INF ADDON: CPT

## 2019-01-17 PROCEDURE — 74011250636 HC RX REV CODE- 250/636: Performed by: HOSPITALIST

## 2019-01-17 PROCEDURE — 36415 COLL VENOUS BLD VENIPUNCTURE: CPT

## 2019-01-17 PROCEDURE — G0378 HOSPITAL OBSERVATION PER HR: HCPCS

## 2019-01-17 PROCEDURE — 97606 NEG PRS WND THER DME>50 SQCM: CPT

## 2019-01-17 PROCEDURE — 74011250637 HC RX REV CODE- 250/637: Performed by: PSYCHIATRY & NEUROLOGY

## 2019-01-17 RX ORDER — CLINDAMYCIN HYDROCHLORIDE 150 MG/1
300 CAPSULE ORAL EVERY 6 HOURS
Status: COMPLETED | OUTPATIENT
Start: 2019-01-17 | End: 2019-01-22

## 2019-01-17 RX ADMIN — BUSPIRONE HYDROCHLORIDE 5 MG: 5 TABLET ORAL at 17:17

## 2019-01-17 RX ADMIN — CARVEDILOL 25 MG: 12.5 TABLET, FILM COATED ORAL at 08:51

## 2019-01-17 RX ADMIN — Medication 10 ML: at 13:12

## 2019-01-17 RX ADMIN — LISINOPRIL 40 MG: 20 TABLET ORAL at 08:51

## 2019-01-17 RX ADMIN — PIPERACILLIN SODIUM AND TAZOBACTAM SODIUM 3.38 G: 3; .375 INJECTION, POWDER, LYOPHILIZED, FOR SOLUTION INTRAVENOUS at 04:46

## 2019-01-17 RX ADMIN — CARVEDILOL 25 MG: 12.5 TABLET, FILM COATED ORAL at 16:25

## 2019-01-17 RX ADMIN — LORAZEPAM 1 MG: 1 TABLET ORAL at 17:18

## 2019-01-17 RX ADMIN — Medication 10 ML: at 04:46

## 2019-01-17 RX ADMIN — HYDROCODONE BITARTRATE AND ACETAMINOPHEN 2 TABLET: 5; 325 TABLET ORAL at 11:55

## 2019-01-17 RX ADMIN — PREGABALIN 225 MG: 75 CAPSULE ORAL at 17:17

## 2019-01-17 RX ADMIN — CLINDAMYCIN HYDROCHLORIDE 300 MG: 150 CAPSULE ORAL at 23:34

## 2019-01-17 RX ADMIN — BUSPIRONE HYDROCHLORIDE 5 MG: 5 TABLET ORAL at 08:51

## 2019-01-17 RX ADMIN — METHYLPHENIDATE HYDROCHLORIDE 10 MG: 5 TABLET ORAL at 08:51

## 2019-01-17 RX ADMIN — ZOLPIDEM TARTRATE 10 MG: 5 TABLET ORAL at 00:03

## 2019-01-17 RX ADMIN — PREGABALIN 225 MG: 75 CAPSULE ORAL at 08:52

## 2019-01-17 RX ADMIN — ONDANSETRON 4 MG: 2 SOLUTION INTRAMUSCULAR; INTRAVENOUS at 21:58

## 2019-01-17 RX ADMIN — Medication 1 CAPSULE: at 11:49

## 2019-01-17 RX ADMIN — CLINDAMYCIN HYDROCHLORIDE 300 MG: 150 CAPSULE ORAL at 13:11

## 2019-01-17 RX ADMIN — HYDROCODONE BITARTRATE AND ACETAMINOPHEN 2 TABLET: 5; 325 TABLET ORAL at 04:46

## 2019-01-17 RX ADMIN — VANCOMYCIN HYDROCHLORIDE 1000 MG: 1 INJECTION, POWDER, LYOPHILIZED, FOR SOLUTION INTRAVENOUS at 11:50

## 2019-01-17 RX ADMIN — CALCIUM CARBONATE-VITAMIN D TAB 500 MG-200 UNIT 1 TABLET: 500-200 TAB at 08:51

## 2019-01-17 RX ADMIN — ENOXAPARIN SODIUM 40 MG: 40 INJECTION, SOLUTION INTRAVENOUS; SUBCUTANEOUS at 08:52

## 2019-01-17 RX ADMIN — ONDANSETRON 4 MG: 2 SOLUTION INTRAMUSCULAR; INTRAVENOUS at 14:18

## 2019-01-17 RX ADMIN — METHADONE HYDROCHLORIDE 10 MG: 10 TABLET ORAL at 08:51

## 2019-01-17 RX ADMIN — HYDROCODONE BITARTRATE AND ACETAMINOPHEN 2 TABLET: 5; 325 TABLET ORAL at 16:21

## 2019-01-17 RX ADMIN — METHADONE HYDROCHLORIDE 10 MG: 10 TABLET ORAL at 20:43

## 2019-01-17 RX ADMIN — LORAZEPAM 1 MG: 1 TABLET ORAL at 06:43

## 2019-01-17 RX ADMIN — Medication 10 ML: at 21:58

## 2019-01-17 RX ADMIN — ZOLPIDEM TARTRATE 10 MG: 5 TABLET ORAL at 21:58

## 2019-01-17 RX ADMIN — TIZANIDINE 2 MG: 4 TABLET ORAL at 04:46

## 2019-01-17 RX ADMIN — DULOXETINE HYDROCHLORIDE 60 MG: 30 CAPSULE, DELAYED RELEASE ORAL at 08:51

## 2019-01-17 RX ADMIN — Medication 10 ML: at 14:19

## 2019-01-17 RX ADMIN — VANCOMYCIN HYDROCHLORIDE 1000 MG: 1 INJECTION, POWDER, LYOPHILIZED, FOR SOLUTION INTRAVENOUS at 00:03

## 2019-01-17 RX ADMIN — CLINDAMYCIN HYDROCHLORIDE 300 MG: 150 CAPSULE ORAL at 17:18

## 2019-01-17 RX ADMIN — BUPROPION HYDROCHLORIDE 450 MG: 150 TABLET, EXTENDED RELEASE ORAL at 08:51

## 2019-01-17 RX ADMIN — Medication 10 ML: at 20:43

## 2019-01-17 RX ADMIN — HYDROCODONE BITARTRATE AND ACETAMINOPHEN 2 TABLET: 5; 325 TABLET ORAL at 20:43

## 2019-01-17 NOTE — PROGRESS NOTES
Problem: Falls - Risk of  Goal: *Absence of Falls  Document Luther Fall Risk and appropriate interventions in the flowsheet.   Outcome: Progressing Towards Goal  Fall Risk Interventions:  Mobility Interventions: PT Consult for mobility concerns    Mentation Interventions: Familiar objects from home    Medication Interventions: Patient to call before getting OOB    Elimination Interventions: Urinal in reach    History of Falls Interventions: Door open when patient unattended

## 2019-01-17 NOTE — PROGRESS NOTES
Physical therapy:     Patient received supine in bed and cleared for ambulation with wound vac. Patient declining physical therapy secondary to increased fatigue and pain. RN aware. Will attempt as able.      Cordelia Martinez, PT

## 2019-01-17 NOTE — PROGRESS NOTES
Hospitalist Progress Note    Subjective:   Daily Progress Note: 1/17/2019 12:01 PM    No reports of pain or chills. Tolerating wound vac without discomfort.   Appears agreeable to snf aslong as it's not long term    Current Facility-Administered Medications   Medication Dose Route Frequency    lactobac ac& pc-s.therm-b.anim (DRE Q/RISAQUAD)  1 Cap Oral DAILY    methylphenidate HCl (RITALIN) tablet 10 mg  10 mg Oral DAILY    vancomycin (VANCOCIN) 1,000 mg in 0.9% sodium chloride (MBP/ADV) 250 mL  1,000 mg IntraVENous Q12H    methadone (DOLOPHINE) tablet 10 mg  10 mg Oral BID    haloperidol lactate (HALDOL) injection 5 mg  5 mg IntraMUSCular Q4H PRN    piperacillin-tazobactam (ZOSYN) 3.375 g in 0.9% sodium chloride (MBP/ADV) 100 mL  3.375 g IntraVENous Q8H    busPIRone (BUSPAR) tablet 5 mg  5 mg Oral BID    sodium chloride (NS) flush 5-40 mL  5-40 mL IntraVENous Q8H    sodium chloride (NS) flush 5-40 mL  5-40 mL IntraVENous PRN    enoxaparin (LOVENOX) injection 40 mg  40 mg SubCUTAneous Q24H    acetaminophen (TYLENOL) tablet 650 mg  650 mg Oral Q6H PRN    ondansetron (ZOFRAN) injection 4 mg  4 mg IntraVENous Q6H PRN    pregabalin (LYRICA) capsule 225 mg  225 mg Oral BID    LORazepam (ATIVAN) tablet 1 mg  1 mg Oral Q8H PRN    HYDROcodone-acetaminophen (NORCO) 5-325 mg per tablet 2 Tab  2 Tab Oral Q4H PRN    carvedilol (COREG) tablet 25 mg  25 mg Oral BID WITH MEALS    DULoxetine (CYMBALTA) capsule 60 mg  60 mg Oral DAILY    polyethylene glycol (MIRALAX) packet 17 g  17 g Oral QID PRN    tiZANidine (ZANAFLEX) tablet 2 mg  2 mg Oral DAILY PRN    buPROPion XL (WELLBUTRIN XL) tablet 450 mg  450 mg Oral 7am    calcium-vitamin D (OS-KRISTINE) 500 mg-200 unit tablet  1 Tab Oral DAILY    lisinopril (PRINIVIL, ZESTRIL) tablet 40 mg  40 mg Oral DAILY    zolpidem (AMBIEN) tablet 10 mg  10 mg Oral QHS PRN        Review of Systems  A comprehensive review of systems was negative except for that written in the HPI.    Objective:     Visit Vitals  /89   Pulse 90   Temp 97.7 °F (36.5 °C)   Resp 18   Ht 6' 7\" (2.007 m)   Wt 99.8 kg (220 lb)   SpO2 98%   BMI 24.78 kg/m²      O2 Device: Room air    Temp (24hrs), Av.4 °F (36.9 °C), Min:97.7 °F (36.5 °C), Max:99.1 °F (37.3 °C)       07 - 1900  In: -   Out: 103 [QJPJQ:069]  01/15 1901 -  07  In: 1 [P.O.:960; I.V.:1450]  Out: 975 [Urine:950; Drains:25]    Visit Vitals  /89   Pulse 90   Temp 97.7 °F (36.5 °C)   Resp 18   Ht 6' 7\" (2.007 m)   Wt 99.8 kg (220 lb)   SpO2 98%   BMI 24.78 kg/m²     General:  Alert, cooperative, no distress, appears stated age. Head:  Normocephalic, without obvious abnormality, atraumatic. Eyes:  Conjunctivae/corneas clear. PERRL, EOMs intact. Fundi benign   Ears:  Normal TMs and external ear canals both ears. Nose: Nares normal. Septum midline. Mucosa normal. No drainage or sinus tenderness. Throat: Lips, mucosa, and tongue normal. Teeth and gums normal.   Neck: Supple, symmetrical, trachea midline, no adenopathy, thyroid: no enlargement/tenderness/nodules, no carotid bruit and no JVD. Back:   Symmetric, no curvature. ROM normal. No CVA tenderness. Lungs:   Clear to auscultation bilaterally. Chest wall:  No tenderness or deformity. Heart:  Regular rate and rhythm, S1, S2 normal, no murmur, click, rub or gallop. Extremities: Extremities normal, atraumatic, no cyanosis or edema. Pulses: 2+ and symmetric all extremities. Skin: Skin color, texture, turgor normal. No rashes or lesions   Lymph nodes: Cervical, supraclavicular, and axillary nodes normal.   Neurologic: CNII-XII intact. Normal strength, sensation and reflexes throughout. Additional comments:I reviewed the patient's new clinical lab test results. crt stable.  cultures negative thus far    Data Review    Recent Results (from the past 24 hour(s))   VANCOMYCIN, RANDOM    Collection Time: 19  8:01 PM   Result Value Ref Range    Vancomycin, random 27.7 UG/ML   CREATININE    Collection Time: 01/17/19  1:42 AM   Result Value Ref Range    Creatinine 0.40 (L) 0.70 - 1.30 MG/DL    GFR est AA >60 >60 ml/min/1.73m2    GFR est non-AA >60 >60 ml/min/1.73m2         Assessment/Plan:     Principal Problem:    Cellulitis (1/13/2019)    Active Problems:    Abscess (1/14/2019)    left thigh abscess - dc iv abx. swith to po clinda for 7 days. Cont wound vac. It appears snf would be ideal for ptot and wound care. djd back and unstable gait - ptot     Agitation overnight - better today. May be related to leg abscess     Hx of asthma/anemia/htn - cont coreg, prinivil.  duonebs prn.             Care Plan discussed with: Patient/Family    Total time spent with patient/care team: 20 minutes.     Signed By: Kameron Burrows DO     January 17, 2019

## 2019-01-17 NOTE — PROGRESS NOTES
It is noted that patient has not slept in nearly 24 hours by the end od my shift today. He verbalizes concerns about losing his apartment and not  Having assistance at home. He states he came to the hospital for c/o knee pain and not abcess in thigh.  and MD notified. Patient encouraged to not take schedueled Ritalin because of insomnia but he is afraid of withdrawls. . Then he asked repeatedly for dose and then wanted on 5 mg of the 10 mg ordered. Patient dislodged PIV x 2 with activity. Restarted x3       Awake all shift. Ambulates to BR with assist of one. Wound vac placed by wound care nurse and patient instructed in aspects of care.

## 2019-01-17 NOTE — PROGRESS NOTES
CM contact De Cece Amador 251 answering services answered. Per danie hours of operation is 9-5. But may be busy that is why they answer left number and message for [facility] to call back. 1452:  HH unable to accept referral for discharge wound care patient needs a  caregiver to learn/assist with wound care. waiting a return call from [facility]/Management. Need to confirm if patient truly homebound? no current PCP establishment to sign HH orders/Plan of care and if wound vac is not approved, will need wound care and will truly need a caregiver to learn / assist. Are some of the concerns from [facility]. CM to follow-up with updates. Inform patient of previous conversation of the Rollator walker. DME referral sent to [address] [employer] [facility]. Nannette Foster will contact patient for walker once approve. Patient state on average he spends 60-80 dollars on lyft to get to doctors appointments, medication pick-up and grocery store. Discuss possibility of having his medication delivered to him at home. Patient wanted to know if his pain medications will also be delivered too. CM to confirm mail order pharmacies. [address] [place] [address] [place]  [phone], [address], Anya Mayer [phone], and [facility] [phone].    1500:  Dr Barton  PCP)-- not accepting new patients and not accepting this new/current insurance. Patient state he is out of all of his current medication except for his pain medication by Dr. Gina Mario Pain management specialist. States he had an appointment 1/15/2019 was not able to attend due to hospital admission. States he called and reschedule his own appointment. Patient state his phychiatric doctor Dr. Courtney Cordova at [facility] has retired has not seen him for over 6 month. Patient is on Methadone states Dr. Helena Lopez writes his prescription and takes it daily.     [address] [place] [address]  [phone]

## 2019-01-17 NOTE — PROGRESS NOTES
0710hrs . Eura Yi Bedside and Verbal shift change report given to Danielle) by [facility] nurse). Report included the following information SBAR, Kardex, Intake/Output, MAR, Recent Results and Med Rec Status.      1530hrs Wound Vac was beeping with an alert on \"Blockage alert\", but seal was intact. Unclamped and clamped tubings, tubing was checked, reset was done. Still with the alert, Hari Nguyen was called and notified. Nursing supervisor was notified and came to check on the vacuum. Reset was done. Alert stopped. Will continue to monitor. 1830hrs Wound vac was still on \"Blockage alert\" and beeping. Removed wound vac as per Carlitos's order if it persist to have problem with blockage. Cleaned with normal saline, applied wet to dry dressing (gauze soaked with normal saline), Covered with ABD and wrapped in Madhu gauze.     1910hrs . Eura Yi Bedside and Verbal shift change report given to Spring Benitez) by [facility] nurse).  Report included the following information SBAR, Kardex, Intake/Output, MAR, Recent Results and Med Rec Status.

## 2019-01-17 NOTE — PROGRESS NOTES
Problem: Pressure Injury - Risk of  Goal: *Prevention of pressure injury  Document Romel Scale and appropriate interventions in the flowsheet. Outcome: Progressing Towards Goal  Pressure Injury Interventions:  Sensory Interventions: Assess changes in LOC, Minimize linen layers, Monitor skin under medical devices, Maintain/enhance activity level    Moisture Interventions: Absorbent underpads    Activity Interventions: Increase time out of bed, Pressure redistribution bed/mattress(bed type), PT/OT evaluation    Mobility Interventions: Float heels, Pressure redistribution bed/mattress (bed type), PT/OT evaluation, Turn and reposition approx. every two hours(pillow and wedges)    Nutrition Interventions: Offer support with meals,snacks and hydration    Friction and Shear Interventions: Apply protective barrier, creams and emollients, Foam dressings/transparent film/skin sealants, Minimize layers               Problem: Falls - Risk of  Goal: *Absence of Falls  Document Luther Fall Risk and appropriate interventions in the flowsheet.   Outcome: Progressing Towards Goal  Fall Risk Interventions:  Mobility Interventions: Bed/chair exit alarm, Communicate number of staff needed for ambulation/transfer, Patient to call before getting OOB, PT Consult for mobility concerns, OT consult for ADLs, Utilize walker, cane, or other assistive device    Mentation Interventions: Adequate sleep, hydration, pain control, Bed/chair exit alarm, Door open when patient unattended, Evaluate medications/consider consulting pharmacy, More frequent rounding, Reorient patient    Medication Interventions: Bed/chair exit alarm, Evaluate medications/consider consulting pharmacy, Patient to call before getting OOB, Teach patient to arise slowly    Elimination Interventions: Call light in reach, Bed/chair exit alarm, Urinal in reach, Toilet paper/wipes in reach, Patient to call for help with toileting needs    History of Falls Interventions: Door open when patient unattended, Evaluate medications/consider consulting pharmacy, Investigate reason for fall, Room close to nurse's station

## 2019-01-17 NOTE — PROGRESS NOTES
Mr. Batsheva Raya has no complaints today. Tm 99.1 HR: 90 BP: 168/89 Resp Rate: 18 98% sat on room air. Intake/Output Summary (Last 24 hours) at 1/17/2019 1214  Last data filed at 1/17/2019 1106  Gross per 24 hour   Intake 350 ml   Output 325 ml   Net 25 ml   Exam: Cor: RRR. Lungs: Bilateral breath sounds. Clear to auscultation. Abd: Soft. Non tender. LLE: Wound vac in place. Holding a seal.             Serosanguinous fluid in canister. Edema, erythema, induration largely resolved. Labs:   Recent Results (from the past 12 hour(s))   CREATININE    Collection Time: 01/17/19  1:42 AM   Result Value Ref Range    Creatinine 0.40 (L) 0.70 - 1.30 MG/DL    GFR est AA >60 >60 ml/min/1.73m2    GFR est non-AA >60 >60 ml/min/1.73m2   Continue wound vac therapy. Clindamycin 300mg every 6 hours. Diet as tolerated. Pain medication and anti-emetics as needed. Discharge planning in progress. Can discharge to home when ready. Will see in 7-10 days after discharge for a wound check. Plans per Dr. Yamilex Parra. Following.

## 2019-01-17 NOTE — PROGRESS NOTES
Continue to follow for discharge planning. Discussed in rounds today. Ms. Maya Acosta met with patient twice today. As indicated we call the [facility] main office twice today and still no returned call back. We contacted the insurance company for . Left message with Aubrie Castañeda. Patient talked this her but her VM list her as the   [phone] . Awaiting call back for someone to be assigned. Working on finding a [address] [place] [address]. [address] cannot accept case without a caregiver. We call [facility] - they cannot accept out of network. Dr. Yasmine Perez and Wound Care Nurse helped to complete the [employer]/Wound Vac Application. We called    [phone]  them to confirm the correct fax #.   [phone]  They will review the application and call us back for additional information or approval.  He will follow for just the Wound Care needs. The other options for wound care is to have patient go to the [facility]. Discussed with Dr. Yasmine Perez and if needed he will write these orders. To discuss with patient-       Working on finding him a PCP in this area who will accept his insurance and easy for him to get to. He has not seen his Mental Health Provider in a while- We are working on finding him a provider within network. Please see Ms. Dickens notes. UAI to request for Home Care/Medicaid Personal Care was completed today and sent electronic to [facility]. It takes 24- 48 hours for the forms to be processed. Will work on finding a personal care agency. Ms. Maya Acosta sent request for [employer] today also.      [facility] MSW RN   [phone]

## 2019-01-17 NOTE — PROGRESS NOTES
1950) Bedside and Verbal shift change report given to EVELIN Olguin (oncoming nurse) by Susan Saunders RN (offgoing nurse). Report included the following information SBAR, Kardex, Procedure Summary, Intake/Output, MAR, Accordion, Recent Results and Med Rec Status. [id number] [id number]) Call received from Eulalio in the pharmacy that the dosage of vancomycin needs to be adjusted/lowered. Information updated on the [facility] per pharmacy.

## 2019-01-18 LAB
BACTERIA SPEC CULT: ABNORMAL
BACTERIA SPEC CULT: NORMAL
GRAM STN SPEC: ABNORMAL
SERVICE CMNT-IMP: ABNORMAL
SERVICE CMNT-IMP: NORMAL

## 2019-01-18 PROCEDURE — 74011250637 HC RX REV CODE- 250/637: Performed by: HOSPITALIST

## 2019-01-18 PROCEDURE — 74011250637 HC RX REV CODE- 250/637: Performed by: PSYCHIATRY & NEUROLOGY

## 2019-01-18 PROCEDURE — 97602 WOUND(S) CARE NON-SELECTIVE: CPT

## 2019-01-18 PROCEDURE — 74011250637 HC RX REV CODE- 250/637: Performed by: NEUROMUSCULOSKELETAL MEDICINE & OMM

## 2019-01-18 PROCEDURE — 97116 GAIT TRAINING THERAPY: CPT | Performed by: PHYSICAL THERAPIST

## 2019-01-18 PROCEDURE — 97606 NEG PRS WND THER DME>50 SQCM: CPT

## 2019-01-18 PROCEDURE — 96376 TX/PRO/DX INJ SAME DRUG ADON: CPT

## 2019-01-18 PROCEDURE — 74011000250 HC RX REV CODE- 250: Performed by: NEUROMUSCULOSKELETAL MEDICINE & OMM

## 2019-01-18 PROCEDURE — 74011250636 HC RX REV CODE- 250/636: Performed by: HOSPITALIST

## 2019-01-18 PROCEDURE — 99218 HC RM OBSERVATION: CPT

## 2019-01-18 PROCEDURE — 96372 THER/PROPH/DIAG INJ SC/IM: CPT

## 2019-01-18 PROCEDURE — G0378 HOSPITAL OBSERVATION PER HR: HCPCS

## 2019-01-18 RX ADMIN — LISINOPRIL 40 MG: 20 TABLET ORAL at 08:21

## 2019-01-18 RX ADMIN — POLYETHYLENE GLYCOL 3350 17 G: 17 POWDER, FOR SOLUTION ORAL at 14:39

## 2019-01-18 RX ADMIN — POLYETHYLENE GLYCOL 3350 17 G: 17 POWDER, FOR SOLUTION ORAL at 02:42

## 2019-01-18 RX ADMIN — CARVEDILOL 25 MG: 12.5 TABLET, FILM COATED ORAL at 17:25

## 2019-01-18 RX ADMIN — METHADONE HYDROCHLORIDE 10 MG: 10 TABLET ORAL at 08:20

## 2019-01-18 RX ADMIN — CARVEDILOL 25 MG: 12.5 TABLET, FILM COATED ORAL at 08:20

## 2019-01-18 RX ADMIN — CLINDAMYCIN HYDROCHLORIDE 300 MG: 150 CAPSULE ORAL at 05:10

## 2019-01-18 RX ADMIN — HYDROCODONE BITARTRATE AND ACETAMINOPHEN 2 TABLET: 5; 325 TABLET ORAL at 18:34

## 2019-01-18 RX ADMIN — HYDROCODONE BITARTRATE AND ACETAMINOPHEN 2 TABLET: 5; 325 TABLET ORAL at 08:20

## 2019-01-18 RX ADMIN — Medication 10 ML: at 20:12

## 2019-01-18 RX ADMIN — ZOLPIDEM TARTRATE 10 MG: 5 TABLET ORAL at 23:30

## 2019-01-18 RX ADMIN — BUSPIRONE HYDROCHLORIDE 5 MG: 5 TABLET ORAL at 17:25

## 2019-01-18 RX ADMIN — Medication 10 ML: at 05:10

## 2019-01-18 RX ADMIN — LORAZEPAM 1 MG: 1 TABLET ORAL at 02:38

## 2019-01-18 RX ADMIN — BUPROPION HYDROCHLORIDE 450 MG: 150 TABLET, EXTENDED RELEASE ORAL at 08:20

## 2019-01-18 RX ADMIN — TIZANIDINE 2 MG: 4 TABLET ORAL at 04:01

## 2019-01-18 RX ADMIN — CLINDAMYCIN HYDROCHLORIDE 300 MG: 150 CAPSULE ORAL at 23:30

## 2019-01-18 RX ADMIN — PREGABALIN 225 MG: 75 CAPSULE ORAL at 17:25

## 2019-01-18 RX ADMIN — ONDANSETRON 4 MG: 2 SOLUTION INTRAMUSCULAR; INTRAVENOUS at 05:10

## 2019-01-18 RX ADMIN — HYDROCODONE BITARTRATE AND ACETAMINOPHEN 2 TABLET: 5; 325 TABLET ORAL at 14:29

## 2019-01-18 RX ADMIN — Medication 1 CAPSULE: at 08:21

## 2019-01-18 RX ADMIN — METHADONE HYDROCHLORIDE 10 MG: 10 TABLET ORAL at 20:12

## 2019-01-18 RX ADMIN — BUSPIRONE HYDROCHLORIDE 5 MG: 5 TABLET ORAL at 08:21

## 2019-01-18 RX ADMIN — METHYLPHENIDATE HYDROCHLORIDE 10 MG: 5 TABLET ORAL at 08:21

## 2019-01-18 RX ADMIN — CALCIUM CARBONATE-VITAMIN D TAB 500 MG-200 UNIT 1 TABLET: 500-200 TAB at 08:21

## 2019-01-18 RX ADMIN — HYDROCODONE BITARTRATE AND ACETAMINOPHEN 2 TABLET: 5; 325 TABLET ORAL at 23:30

## 2019-01-18 RX ADMIN — LORAZEPAM 1 MG: 1 TABLET ORAL at 20:12

## 2019-01-18 RX ADMIN — CLINDAMYCIN HYDROCHLORIDE 300 MG: 150 CAPSULE ORAL at 13:15

## 2019-01-18 RX ADMIN — HYDROCODONE BITARTRATE AND ACETAMINOPHEN 2 TABLET: 5; 325 TABLET ORAL at 02:38

## 2019-01-18 RX ADMIN — DULOXETINE HYDROCHLORIDE 60 MG: 30 CAPSULE, DELAYED RELEASE ORAL at 08:21

## 2019-01-18 RX ADMIN — PREGABALIN 225 MG: 75 CAPSULE ORAL at 08:20

## 2019-01-18 RX ADMIN — Medication 10 ML: at 14:29

## 2019-01-18 RX ADMIN — CLINDAMYCIN HYDROCHLORIDE 300 MG: 150 CAPSULE ORAL at 17:25

## 2019-01-18 RX ADMIN — LORAZEPAM 1 MG: 1 TABLET ORAL at 10:20

## 2019-01-18 RX ADMIN — ENOXAPARIN SODIUM 40 MG: 40 INJECTION, SOLUTION INTRAVENOUS; SUBCUTANEOUS at 08:21

## 2019-01-18 RX ADMIN — ACETAMINOPHEN 650 MG: 325 TABLET, FILM COATED ORAL at 04:01

## 2019-01-18 NOTE — PROGRESS NOTES
CM call patient patient insurance [address] [facility] -[phone]. Miya Unger is not patient . CM left a voice mail for RN establish a  for patient in the community through his insurance company. 0945: CM call insurance receive a list of dentist providers in-network for patient dental work a copy will be provider for patient to follow-up outpatient. Per representative if patient dental work is medically necessity it is covered at a 100%. 1000: PCP appointment scheduled at [facility] for patient with nurse practitioner Rina Jauregui 2/8/2019 @3:30pm.    1015: Surgery for [place] schedule for 1/31/2019 at 1:00 pm. Patient needs to arrive 15 min early. 1030: Dr. Reilly Yarbrough appointment schedule for 4/1/2019 @ 8:30. Patient needs to arrive 10 minute early if late Dr. Douglas Medrano not see you. If patient nocall/noshow patient will not be able to re-schedule at his office in the future. 1200: Referral sent in [address] for [facility] facility for possible admission for wound care for patient. CM discuss SNF inpatient/OBS regulation with patient cost, coverage his portion from day  for traditional Medicare patient has Medicare [address] it will depend on his plan. 1430: CM inform patient have not heard from [employer] in reference to his apartment  and the pot of beans here has on the stove. Patient ask if we have a cleaning services that would go out and clean for him. Inform patient I will provide a list of cleaning services that are able to assist him with that and for him to call and set it up. Patient ask if it would be over 100 dollars inform patient he would have to discuss that with agencies himself. Inform patient UAI is done and submitted waiting on wheter he is denied or approve. Patient state he should meet personal care. CM inform patient if denied we need another plan.  Patient state he has to meet criteria he needs help at home he cannot take care of himself at home any longer. CM inform patient to discuss this with family (ex-wife 2 sons). Patient state that is not an option we are not on good terms. CM inform your other options would be an assisted living. Patient state it scares him to go to old people home. Option one: Personal aid: waiting on wheter patient has met criteria for assistance at home. Option two: Speak with family. Patient states not an option not close with family (ex-wife, 2 sons, zero friends). Option three: assisted living states he does not want to go to an old people home. Inform patient of PCP, Surgery, Psych, and dentist appointments and options. Patient state he has not seeing Dr. Tez Akbar [phone] (personal cell no longer practice and no office). In 6 month had refill till this month. Patient state he would like his appointment with Dr. Prisca sanchezcel saw the MD once did not like the doctor states he will schedule his own appointment. Patient insurance has limited in network facilities and providers patient is aware. Patient states he gets 16 dollars in food stamps and ex-wife willing to give him 1000.00 to go towards his dental work/cost.    Patient refuse PT/OT CM inform patient if he wants DME and assistance at home he needs to work with PT/OT when they come for assessment. KEON and TEVIN notification sign patient chart.         [address] [place] [address]  [phone] General

## 2019-01-18 NOTE — PROGRESS NOTES
Patient treatment plan discuss. Patient wound and wound vac discussion. Patient wound vac possible discontinued. Per RN patient was asking for a high dose of methadone within 1 hour  Patient labs and treatment plan discuss and discuss pain medication. Maninder Campos [facility] CM.

## 2019-01-18 NOTE — PROGRESS NOTES
Hospitalist Progress Note    Subjective:   Daily Progress Note: 1/18/2019 9:43 AM    Wound vac stopped overnight. To be replaced. . Reports lack of appetite    Current Facility-Administered Medications   Medication Dose Route Frequency    lactobac ac& pc-s.therm-b.anim (DRE Q/RISAQUAD)  1 Cap Oral DAILY    clindamycin (CLEOCIN) capsule 300 mg  300 mg Oral Q6H    methylphenidate HCl (RITALIN) tablet 10 mg  10 mg Oral DAILY    methadone (DOLOPHINE) tablet 10 mg  10 mg Oral BID    haloperidol lactate (HALDOL) injection 5 mg  5 mg IntraMUSCular Q4H PRN    busPIRone (BUSPAR) tablet 5 mg  5 mg Oral BID    sodium chloride (NS) flush 5-40 mL  5-40 mL IntraVENous Q8H    sodium chloride (NS) flush 5-40 mL  5-40 mL IntraVENous PRN    enoxaparin (LOVENOX) injection 40 mg  40 mg SubCUTAneous Q24H    acetaminophen (TYLENOL) tablet 650 mg  650 mg Oral Q6H PRN    ondansetron (ZOFRAN) injection 4 mg  4 mg IntraVENous Q6H PRN    pregabalin (LYRICA) capsule 225 mg  225 mg Oral BID    LORazepam (ATIVAN) tablet 1 mg  1 mg Oral Q8H PRN    HYDROcodone-acetaminophen (NORCO) 5-325 mg per tablet 2 Tab  2 Tab Oral Q4H PRN    carvedilol (COREG) tablet 25 mg  25 mg Oral BID WITH MEALS    DULoxetine (CYMBALTA) capsule 60 mg  60 mg Oral DAILY    polyethylene glycol (MIRALAX) packet 17 g  17 g Oral QID PRN    tiZANidine (ZANAFLEX) tablet 2 mg  2 mg Oral DAILY PRN    buPROPion XL (WELLBUTRIN XL) tablet 450 mg  450 mg Oral 7am    calcium-vitamin D (OS-KRISTINE) 500 mg-200 unit tablet  1 Tab Oral DAILY    lisinopril (PRINIVIL, ZESTRIL) tablet 40 mg  40 mg Oral DAILY    zolpidem (AMBIEN) tablet 10 mg  10 mg Oral QHS PRN        Review of Systems  A comprehensive review of systems was negative except for that written in the HPI.     Objective:     Visit Vitals  /86 (BP 1 Location: Left arm, BP Patient Position: At rest)   Pulse 86   Temp 99.4 °F (37.4 °C)   Resp 20   Ht 6' 7\" (2.007 m)   Wt 99.8 kg (220 lb)   SpO2 99%   BMI 24.78 kg/m²      O2 Device: Room air    Temp (24hrs), Av.5 °F (37.5 °C), Min:98.8 °F (37.1 °C), Max:100.3 °F (37.9 °C)      No intake/output data recorded.  1901 -  0700  In: 350 [I.V.:350]  Out: 36 [Urine:900; Drains:25]    Visit Vitals  /86 (BP 1 Location: Left arm, BP Patient Position: At rest)   Pulse 86   Temp 99.4 °F (37.4 °C)   Resp 20   Ht 6' 7\" (2.007 m)   Wt 99.8 kg (220 lb)   SpO2 99%   BMI 24.78 kg/m²     General:  Alert, cooperative, no distress, appears stated age. Head:  Normocephalic, without obvious abnormality, atraumatic. Eyes:  Conjunctivae/corneas clear. PERRL, EOMs intact. Fundi benign. Ears:  Normal TMs and external ear canals both ears. Nose: Nares normal. Septum midline. Mucosa normal. No drainage or sinus tenderness. Throat: Lips, mucosa, and tongue normal. Teeth and gums normal.   Neck: Supple, symmetrical, trachea midline, no adenopathy, thyroid: no enlargement/tenderness/nodules, no carotid bruit and no JVD. Back:   Symmetric, no curvature. ROM normal. No CVA tenderness. Lungs:   Clear to auscultation bilaterally. Chest wall:  No tenderness or deformity. Heart:  Regular rate and rhythm, S1, S2 normal, no murmur, click, rub or gallop. Extremities: Extremities normal, atraumatic, no cyanosis or edema. Pulses: 2+ and symmetric all extremities. Skin: Skin color, texture, turgor normal. No rashes or lesions. Lymph nodes: Cervical, supraclavicular, and axillary nodes normal.   Neurologic: CNII-XII intact. Normal strength, sensation and reflexes throughout. Additional comments:None    Data Review    No results found for this or any previous visit (from the past 24 hour(s)). Assessment/Plan:     Principal Problem:    Cellulitis (2019)    Active Problems:    Abscess (2019)    left thigh abscess - dc iv abx.  complete po clinda for 7 days. Cont wound vac.   It appears snf would be ideal for ptot and wound care.     djd back and unstable gait - ptot       Hx of asthma/anemia/htn - cont coreg, prinivil.  dumyah prn. Care Plan discussed with: Patient/Family    Total time spent with patient: 20 minutes.     Signed By: Cristi Cid DO     January 18, 2019

## 2019-01-18 NOTE — WOUND CARE
Wound care revisited the wound today. The wound VAC discontinued last night due to constant beeping says blockage. This morning removed the wet to dry dressing cleaned the wound with NS and dried gently. Wound measured 12 x 4 x 1.8 Placed the drape and placed black foam in the wound and applied the drape. Connected the trac pad to the wound VAC to 125 mmhg with a good suction. Patient tolerated the procedure well. Will change the VAC in 72 hours on Monday.     Israel Nation RN, [facility]

## 2019-01-18 NOTE — PROGRESS NOTES
1935: Bedside and Verbal shift change report given to Say2me (oncoming nurse) by Yulissa Ramsey (offgoing nurse). Report included the following information SBAR. Patient sleeping during report. 1950: Spoke with Pharmacy about Creatinine ordered for Vancomycin monitoring. Patient is not receiving Vancomycin currently. Pharmacy says the lab can be discontinued.

## 2019-01-18 NOTE — PROGRESS NOTES
Problem: Mobility Impaired (Adult and Pediatric)  Goal: *Acute Goals and Plan of Care (Insert Text)  Physical Therapy Goals  Initiated 1/14/2019  1. Patient will move from supine to sit and sit to supine  in bed with modified independence within 7 day(s). 2.  Patient will transfer from bed to chair and chair to bed with supervision/set-up using the least restrictive device within 7 day(s). 3.  Patient will perform sit to stand with supervision/set-up within 7 day(s). 4.  Patient will ambulate with contact guard assist for 200 feet with the least restrictive device within 7 day(s). physical Therapy TREATMENT  Patient: Jeanie Dasilva (76 y.o. male)  Date: 1/18/2019  Diagnosis: Cellulitis Cellulitis  Procedure(s) (LRB):  INCISION AND DRAINAGE LEFT THIGH ABSCESS (Left) 4 Days Post-Op  Precautions:    Chart, physical therapy assessment, plan of care and goals were reviewed. ASSESSMENT:  Patient received supine in bed and agreeable to ambulation with increased encouragement. Patient performed bed mobility with modified independence and transfers with stand-by assistance to walker. Patient ambulated 50 feet with walker and contact guard assistance. Patient with decreased weight bearing through left LE secondary to pain. Patient instructed to perform exercises in bed as able throughout the day. Patient with decreased activity tolerance and reported falls at home; would benefit from rollator walker for safety. Progression toward goals:  [x]    Improving appropriately and progressing toward goals  []    Improving slowly and progressing toward goals  []    Not making progress toward goals and plan of care will be adjusted     PLAN:  Patient continues to benefit from skilled intervention to address the above impairments. Continue treatment per established plan of care.   Discharge Recommendations:  Home Health  Further Equipment Recommendations for Discharge:  rollator walker     SUBJECTIVE:   Patient stated I'm worried about my bills if I have to a facility.     OBJECTIVE DATA SUMMARY:   Critical Behavior:  Neurologic State: Alert, Irritable  Orientation Level: Oriented X4  Cognition: Appropriate decision making, Appropriate for age attention/concentration, Appropriate safety awareness, Follows commands  Safety/Judgement: Awareness of environment, Fall prevention, Home safety     Functional Mobility Training:  Bed Mobility:  Supine to Sit: Modified independent  Sit to Supine: Modified independent  Scooting: Modified independent    Transfers:  Sit to Stand: Stand-by assistance  Stand to Sit: Stand-by assistance     Balance:  Sitting: Intact  Standing: Impaired; With support  Standing - Static: Good  Standing - Dynamic : Fair  Ambulation/Gait Training:  Distance (ft): 50 Feet (ft)  Assistive Device: Gait belt;Walker, rolling  Ambulation - Level of Assistance: Contact guard assistance  Gait Abnormalities: Decreased step clearance  Base of Support: Narrowed  Speed/Renea: Pace decreased (<100 feet/min)  Step Length: Right shortened;Left shortened    Pain:  Pain Scale 1: Numeric (0 - 10)  Pain Intensity 1: 0  Pain Location 1: Leg  Pain Orientation 1: Upper  Pain Description 1: Aching  Pain Intervention(s) 1: Medication (see MAR)     Activity Tolerance:   Fair  Please refer to the flowsheet for vital signs taken during this treatment.   After treatment:   []    Patient left in no apparent distress sitting up in chair  [x]    Patient left in no apparent distress in bed  [x]    Call bell left within reach  [x]    Nursing notified  []    Caregiver present  []    Bed alarm activated    COMMUNICATION/COLLABORATION:   The patients plan of care was discussed with: Registered Nurse    Cordelia Martinez, PT   Time Calculation: 20 mins

## 2019-01-18 NOTE — PROGRESS NOTES
Problem: Falls - Risk of  Goal: *Absence of Falls  Document Luther Fall Risk and appropriate interventions in the flowsheet.   Outcome: Progressing Towards Goal  Fall Risk Interventions:  Mobility Interventions: Bed/chair exit alarm    Mentation Interventions: Adequate sleep, hydration, pain control    Medication Interventions: Bed/chair exit alarm    Elimination Interventions: Call light in reach    History of Falls Interventions: Door open when patient unattended

## 2019-01-18 NOTE — PROGRESS NOTES
Physical therapy:     Patient received supine in bed. Patient declining ambulation this AM. Patient educated on importance of mobility. Will return later this afternoon for second attempt.      Edgar Beatty, PT

## 2019-01-19 PROCEDURE — 99218 HC RM OBSERVATION: CPT

## 2019-01-19 PROCEDURE — 74011250636 HC RX REV CODE- 250/636: Performed by: HOSPITALIST

## 2019-01-19 PROCEDURE — 74011250637 HC RX REV CODE- 250/637: Performed by: PSYCHIATRY & NEUROLOGY

## 2019-01-19 PROCEDURE — 96372 THER/PROPH/DIAG INJ SC/IM: CPT

## 2019-01-19 PROCEDURE — G0378 HOSPITAL OBSERVATION PER HR: HCPCS

## 2019-01-19 PROCEDURE — 74011250637 HC RX REV CODE- 250/637: Performed by: NEUROMUSCULOSKELETAL MEDICINE & OMM

## 2019-01-19 RX ADMIN — Medication 1 CAPSULE: at 08:39

## 2019-01-19 RX ADMIN — CLINDAMYCIN HYDROCHLORIDE 300 MG: 150 CAPSULE ORAL at 23:24

## 2019-01-19 RX ADMIN — METHYLPHENIDATE HYDROCHLORIDE 10 MG: 5 TABLET ORAL at 08:38

## 2019-01-19 RX ADMIN — PREGABALIN 225 MG: 75 CAPSULE ORAL at 17:02

## 2019-01-19 RX ADMIN — Medication 10 ML: at 20:52

## 2019-01-19 RX ADMIN — CLINDAMYCIN HYDROCHLORIDE 300 MG: 150 CAPSULE ORAL at 05:09

## 2019-01-19 RX ADMIN — LORAZEPAM 1 MG: 1 TABLET ORAL at 15:22

## 2019-01-19 RX ADMIN — Medication 10 ML: at 05:09

## 2019-01-19 RX ADMIN — PREGABALIN 225 MG: 75 CAPSULE ORAL at 08:37

## 2019-01-19 RX ADMIN — CARVEDILOL 25 MG: 12.5 TABLET, FILM COATED ORAL at 08:38

## 2019-01-19 RX ADMIN — LORAZEPAM 1 MG: 1 TABLET ORAL at 07:45

## 2019-01-19 RX ADMIN — LISINOPRIL 40 MG: 20 TABLET ORAL at 08:38

## 2019-01-19 RX ADMIN — CLINDAMYCIN HYDROCHLORIDE 300 MG: 150 CAPSULE ORAL at 11:53

## 2019-01-19 RX ADMIN — ZOLPIDEM TARTRATE 10 MG: 5 TABLET ORAL at 23:24

## 2019-01-19 RX ADMIN — BUPROPION HYDROCHLORIDE 450 MG: 150 TABLET, EXTENDED RELEASE ORAL at 08:38

## 2019-01-19 RX ADMIN — METHADONE HYDROCHLORIDE 10 MG: 10 TABLET ORAL at 20:52

## 2019-01-19 RX ADMIN — DULOXETINE HYDROCHLORIDE 60 MG: 30 CAPSULE, DELAYED RELEASE ORAL at 08:38

## 2019-01-19 RX ADMIN — Medication 10 ML: at 13:22

## 2019-01-19 RX ADMIN — BUSPIRONE HYDROCHLORIDE 5 MG: 5 TABLET ORAL at 17:02

## 2019-01-19 RX ADMIN — BUSPIRONE HYDROCHLORIDE 5 MG: 5 TABLET ORAL at 08:39

## 2019-01-19 RX ADMIN — HYDROCODONE BITARTRATE AND ACETAMINOPHEN 2 TABLET: 5; 325 TABLET ORAL at 11:53

## 2019-01-19 RX ADMIN — LORAZEPAM 1 MG: 1 TABLET ORAL at 23:24

## 2019-01-19 RX ADMIN — CALCIUM CARBONATE-VITAMIN D TAB 500 MG-200 UNIT 1 TABLET: 500-200 TAB at 08:38

## 2019-01-19 RX ADMIN — ENOXAPARIN SODIUM 40 MG: 40 INJECTION, SOLUTION INTRAVENOUS; SUBCUTANEOUS at 08:37

## 2019-01-19 RX ADMIN — CARVEDILOL 25 MG: 12.5 TABLET, FILM COATED ORAL at 17:02

## 2019-01-19 RX ADMIN — HYDROCODONE BITARTRATE AND ACETAMINOPHEN 2 TABLET: 5; 325 TABLET ORAL at 20:52

## 2019-01-19 RX ADMIN — HYDROCODONE BITARTRATE AND ACETAMINOPHEN 2 TABLET: 5; 325 TABLET ORAL at 17:02

## 2019-01-19 RX ADMIN — METHADONE HYDROCHLORIDE 10 MG: 10 TABLET ORAL at 08:38

## 2019-01-19 RX ADMIN — TIZANIDINE 2 MG: 4 TABLET ORAL at 23:25

## 2019-01-19 RX ADMIN — CLINDAMYCIN HYDROCHLORIDE 300 MG: 150 CAPSULE ORAL at 17:02

## 2019-01-19 NOTE — PROGRESS NOTES
0710hrs . Cherie Childers Bedside and Verbal shift change report given to Michael Heard (oncoming nurse) by Caro Ravi (offgoing nurse). Report included the following information SBAR, Kardex, Intake/Output, MAR, Recent Results and Med Rec Status.      1910hrs . Cherie Childers Bedside and Verbal shift change report given to Tamica (oncoming nurse) by [facility] nurse).  Report included the following information SBAR, Kardex, Intake/Output, MAR, Recent Results and Med Rec Status.

## 2019-01-19 NOTE — PROGRESS NOTES
Bedside shift change report given to me (oncoming nurse) by Leonidas Akbar (offgoing nurse). Report included the following information SBAR, Kardex, Intake/Output, MAR and Recent Results.

## 2019-01-19 NOTE — PROGRESS NOTES
Hospitalist Progress Note    Subjective:   Daily Progress Note: 1/19/2019 10:33 AM    Reports mild left thigh pain from wound vac. Slept well last evening    Current Facility-Administered Medications   Medication Dose Route Frequency    lactobac ac& pc-s.therm-b.anim (DRE Q/RISAQUAD)  1 Cap Oral DAILY    clindamycin (CLEOCIN) capsule 300 mg  300 mg Oral Q6H    methylphenidate HCl (RITALIN) tablet 10 mg  10 mg Oral DAILY    methadone (DOLOPHINE) tablet 10 mg  10 mg Oral BID    haloperidol lactate (HALDOL) injection 5 mg  5 mg IntraMUSCular Q4H PRN    busPIRone (BUSPAR) tablet 5 mg  5 mg Oral BID    sodium chloride (NS) flush 5-40 mL  5-40 mL IntraVENous Q8H    sodium chloride (NS) flush 5-40 mL  5-40 mL IntraVENous PRN    enoxaparin (LOVENOX) injection 40 mg  40 mg SubCUTAneous Q24H    acetaminophen (TYLENOL) tablet 650 mg  650 mg Oral Q6H PRN    ondansetron (ZOFRAN) injection 4 mg  4 mg IntraVENous Q6H PRN    pregabalin (LYRICA) capsule 225 mg  225 mg Oral BID    LORazepam (ATIVAN) tablet 1 mg  1 mg Oral Q8H PRN    HYDROcodone-acetaminophen (NORCO) 5-325 mg per tablet 2 Tab  2 Tab Oral Q4H PRN    carvedilol (COREG) tablet 25 mg  25 mg Oral BID WITH MEALS    DULoxetine (CYMBALTA) capsule 60 mg  60 mg Oral DAILY    polyethylene glycol (MIRALAX) packet 17 g  17 g Oral QID PRN    tiZANidine (ZANAFLEX) tablet 2 mg  2 mg Oral DAILY PRN    buPROPion XL (WELLBUTRIN XL) tablet 450 mg  450 mg Oral 7am    calcium-vitamin D (OS-KRISTINE) 500 mg-200 unit tablet  1 Tab Oral DAILY    lisinopril (PRINIVIL, ZESTRIL) tablet 40 mg  40 mg Oral DAILY    zolpidem (AMBIEN) tablet 10 mg  10 mg Oral QHS PRN        Review of Systems  A comprehensive review of systems was negative except for that written in the HPI.     Objective:     Visit Vitals  /77 (BP 1 Location: Left arm, BP Patient Position: At rest;Supine)   Pulse 88   Temp 99.4 °F (37.4 °C)   Resp 18   Ht 6' 7\" (2.007 m)   Wt 99.8 kg (220 lb)   SpO2 96% BMI 24.78 kg/m²      O2 Device: Room air    Temp (24hrs), Av.2 °F (37.3 °C), Min:98.7 °F (37.1 °C), Max:99.5 °F (37.5 °C)      No intake/output data recorded.  1901 -  0700  In: -   Out: 600 [Urine:600]    Visit Vitals  /77 (BP 1 Location: Left arm, BP Patient Position: At rest;Supine)   Pulse 88   Temp 99.4 °F (37.4 °C)   Resp 18   Ht 6' 7\" (2.007 m)   Wt 99.8 kg (220 lb)   SpO2 96%   BMI 24.78 kg/m²     General:  Alert, cooperative, no distress, appears stated age. Head:  Normocephalic, without obvious abnormality, atraumatic. Eyes:  Conjunctivae/corneas clear. PERRL, EOMs intact. Fundi benign   Ears:  Normal TMs and external ear canals both ears. Nose: Nares normal. Septum midline. Mucosa normal. No drainage or sinus tenderness. Throat: Lips, mucosa, and tongue normal. Teeth and gums normal.   Neck: Supple, symmetrical, trachea midline, no adenopathy, thyroid: no enlargement/tenderness/nodules, no carotid bruit and no JVD. Back:   Symmetric, no curvature. ROM normal. No CVA tenderness. Lungs:   Clear to auscultation bilaterally. Chest wall:  No tenderness or deformity. Heart:  Regular rate and rhythm, S1, S2 normal, no murmur, click, rub or gallop. Extremities: Extremities normal, atraumatic, no cyanosis or edema. Pulses: 2+ and symmetric all extremities. Skin: Skin color, texture, turgor normal. No rashes or lesions   Lymph nodes: Cervical, supraclavicular, and axillary nodes normal.   Neurologic: CNII-XII intact. Normal strength, sensation and reflexes throughout. Additional comments:None    Data Review    No results found for this or any previous visit (from the past 24 hour(s)). Assessment/Plan:     Principal Problem:    Cellulitis (2019)    Active Problems:    Abscess (2019)     left thigh abscess - dc iv abx.  complete po clinda for 7 days.  Cont wound vac.  snf would be ideal for ptot and wound care. Cm following.   Cont inpt ptot.     djd back and unstable gait - ptot        Hx of asthma/anemia/htn - cont coreg, prinivil.  duonebs prn.                Care Plan discussed with: Patient/Family    Total time spent with patient: 20 minutes.     Signed By: Victor Hugo Sanders DO     January 19, 2019

## 2019-01-19 NOTE — PROGRESS NOTES
Problem: Pressure Injury - Risk of  Goal: *Prevention of pressure injury  Document Romel Scale and appropriate interventions in the flowsheet. Outcome: Progressing Towards Goal  Pressure Injury Interventions:  Sensory Interventions: Assess changes in LOC    Moisture Interventions: Absorbent underpads    Activity Interventions: PT/OT evaluation    Mobility Interventions: PT/OT evaluation    Nutrition Interventions: Document food/fluid/supplement intake    Friction and Shear Interventions: Apply protective barrier, creams and emollients               Problem: Falls - Risk of  Goal: *Absence of Falls  Document Luther Fall Risk and appropriate interventions in the flowsheet.   Outcome: Progressing Towards Goal  Fall Risk Interventions:  Mobility Interventions: Bed/chair exit alarm    Mentation Interventions: Adequate sleep, hydration, pain control    Medication Interventions: Bed/chair exit alarm    Elimination Interventions: Call light in reach    History of Falls Interventions: Door open when patient unattended

## 2019-01-19 NOTE — PROGRESS NOTES
Spiritual Care Partner Volunteer visited patient in Med and surg on January 17, 2019. Documented by:  KATHY Jasso 62 Paging Service [phone]

## 2019-01-19 NOTE — PROGRESS NOTES
Problem: Pressure Injury - Risk of  Goal: *Prevention of pressure injury  Document Romel Scale and appropriate interventions in the flowsheet. Outcome: Progressing Towards Goal  Pressure Injury Interventions:  Sensory Interventions: Assess changes in LOC, Avoid rigorous massage over bony prominences, Check visual cues for pain, Minimize linen layers, Maintain/enhance activity level, Keep linens dry and wrinkle-free, Pressure redistribution bed/mattress (bed type)    Moisture Interventions: Absorbent underpads, Apply protective barrier, creams and emollients, Maintain skin hydration (lotion/cream), Minimize layers, Moisture barrier, Offer toileting Q_hr    Activity Interventions: Increase time out of bed, Pressure redistribution bed/mattress(bed type), PT/OT evaluation    Mobility Interventions: HOB 30 degrees or less, Pressure redistribution bed/mattress (bed type), PT/OT evaluation    Nutrition Interventions: Offer support with meals,snacks and hydration    Friction and Shear Interventions: Apply protective barrier, creams and emollients               Problem: Falls - Risk of  Goal: *Absence of Falls  Document Luther Fall Risk and appropriate interventions in the flowsheet.   Outcome: Progressing Towards Goal  Fall Risk Interventions:  Mobility Interventions: Communicate number of staff needed for ambulation/transfer, OT consult for ADLs, Patient to call before getting OOB, PT Consult for mobility concerns, Utilize walker, cane, or other assistive device    Mentation Interventions: Adequate sleep, hydration, pain control, Evaluate medications/consider consulting pharmacy, More frequent rounding, Toileting rounds, Update white board    Medication Interventions: Evaluate medications/consider consulting pharmacy, Patient to call before getting OOB, Teach patient to arise slowly    Elimination Interventions: Call light in reach, Elevated toilet seat, Toileting schedule/hourly rounds, Urinal in reach, Toilet paper/wipes in reach, Patient to call for help with toileting needs    History of Falls Interventions: Door open when patient unattended, Evaluate medications/consider consulting pharmacy, Investigate reason for fall

## 2019-01-20 PROCEDURE — 96372 THER/PROPH/DIAG INJ SC/IM: CPT

## 2019-01-20 PROCEDURE — 96376 TX/PRO/DX INJ SAME DRUG ADON: CPT

## 2019-01-20 PROCEDURE — 74011250636 HC RX REV CODE- 250/636: Performed by: HOSPITALIST

## 2019-01-20 PROCEDURE — G0378 HOSPITAL OBSERVATION PER HR: HCPCS

## 2019-01-20 PROCEDURE — 74011250637 HC RX REV CODE- 250/637: Performed by: NEUROMUSCULOSKELETAL MEDICINE & OMM

## 2019-01-20 PROCEDURE — 99218 HC RM OBSERVATION: CPT

## 2019-01-20 PROCEDURE — 74011250637 HC RX REV CODE- 250/637: Performed by: PSYCHIATRY & NEUROLOGY

## 2019-01-20 RX ADMIN — Medication 10 ML: at 16:27

## 2019-01-20 RX ADMIN — Medication 1 CAPSULE: at 08:25

## 2019-01-20 RX ADMIN — HYDROCODONE BITARTRATE AND ACETAMINOPHEN 2 TABLET: 5; 325 TABLET ORAL at 20:26

## 2019-01-20 RX ADMIN — CALCIUM CARBONATE-VITAMIN D TAB 500 MG-200 UNIT 1 TABLET: 500-200 TAB at 08:25

## 2019-01-20 RX ADMIN — BUSPIRONE HYDROCHLORIDE 5 MG: 5 TABLET ORAL at 17:38

## 2019-01-20 RX ADMIN — Medication 10 ML: at 20:27

## 2019-01-20 RX ADMIN — TIZANIDINE 2 MG: 4 TABLET ORAL at 08:21

## 2019-01-20 RX ADMIN — CARVEDILOL 25 MG: 12.5 TABLET, FILM COATED ORAL at 08:25

## 2019-01-20 RX ADMIN — PREGABALIN 225 MG: 75 CAPSULE ORAL at 17:38

## 2019-01-20 RX ADMIN — CLINDAMYCIN HYDROCHLORIDE 300 MG: 150 CAPSULE ORAL at 23:00

## 2019-01-20 RX ADMIN — DULOXETINE HYDROCHLORIDE 60 MG: 30 CAPSULE, DELAYED RELEASE ORAL at 08:24

## 2019-01-20 RX ADMIN — LORAZEPAM 1 MG: 1 TABLET ORAL at 22:58

## 2019-01-20 RX ADMIN — LORAZEPAM 1 MG: 1 TABLET ORAL at 10:07

## 2019-01-20 RX ADMIN — ONDANSETRON 4 MG: 2 SOLUTION INTRAMUSCULAR; INTRAVENOUS at 06:36

## 2019-01-20 RX ADMIN — ENOXAPARIN SODIUM 40 MG: 40 INJECTION, SOLUTION INTRAVENOUS; SUBCUTANEOUS at 08:24

## 2019-01-20 RX ADMIN — HYDROCODONE BITARTRATE AND ACETAMINOPHEN 2 TABLET: 5; 325 TABLET ORAL at 05:02

## 2019-01-20 RX ADMIN — Medication 10 ML: at 05:02

## 2019-01-20 RX ADMIN — BUSPIRONE HYDROCHLORIDE 5 MG: 5 TABLET ORAL at 08:25

## 2019-01-20 RX ADMIN — METHADONE HYDROCHLORIDE 10 MG: 10 TABLET ORAL at 08:25

## 2019-01-20 RX ADMIN — PREGABALIN 225 MG: 75 CAPSULE ORAL at 08:25

## 2019-01-20 RX ADMIN — HYDROCODONE BITARTRATE AND ACETAMINOPHEN 2 TABLET: 5; 325 TABLET ORAL at 16:40

## 2019-01-20 RX ADMIN — CLINDAMYCIN HYDROCHLORIDE 300 MG: 150 CAPSULE ORAL at 12:21

## 2019-01-20 RX ADMIN — CLINDAMYCIN HYDROCHLORIDE 300 MG: 150 CAPSULE ORAL at 17:38

## 2019-01-20 RX ADMIN — METHYLPHENIDATE HYDROCHLORIDE 10 MG: 5 TABLET ORAL at 08:25

## 2019-01-20 RX ADMIN — ZOLPIDEM TARTRATE 10 MG: 5 TABLET ORAL at 20:26

## 2019-01-20 RX ADMIN — CARVEDILOL 25 MG: 12.5 TABLET, FILM COATED ORAL at 16:27

## 2019-01-20 RX ADMIN — CLINDAMYCIN HYDROCHLORIDE 300 MG: 150 CAPSULE ORAL at 05:02

## 2019-01-20 RX ADMIN — BUPROPION HYDROCHLORIDE 450 MG: 150 TABLET, EXTENDED RELEASE ORAL at 08:25

## 2019-01-20 RX ADMIN — METHADONE HYDROCHLORIDE 10 MG: 10 TABLET ORAL at 20:26

## 2019-01-20 RX ADMIN — HYDROCODONE BITARTRATE AND ACETAMINOPHEN 2 TABLET: 5; 325 TABLET ORAL at 12:21

## 2019-01-20 RX ADMIN — LISINOPRIL 40 MG: 20 TABLET ORAL at 08:24

## 2019-01-20 NOTE — PROGRESS NOTES
0710hrs . Jagruti Christianson Bedside and Verbal shift change report given to Danielle) by Tamica (offgoing nurse). Report included the following information SBAR, Kardex, Intake/Output, MAR, Recent Results and Med Rec Status.      1910hrs . Jagruti Christianson Bedside and Verbal shift change report given to Tamica (oncoming nurse) by [facility] nurse).  Report included the following information SBAR, Kardex, Intake/Output, MAR, Recent Results and Med Rec Status.

## 2019-01-20 NOTE — PROGRESS NOTES
Spiritual Care Partner Volunteer visited patient in Med Surg on January 18, 2016.   Documented on January 20th by:    Socorro Gann, SHRUTHI, [facility], Chaplain BONNIE HUANG [facility] Paging Service  [phone]

## 2019-01-20 NOTE — PROGRESS NOTES
Hospitalist Progress Note    Subjective:   Daily Progress Note: 1/20/2019 12:01 PM    No reports of pain or fever. Tolerating wound vac. Current Facility-Administered Medications   Medication Dose Route Frequency    lactobac ac& pc-s.therm-b.anim (DRE Q/RISAQUAD)  1 Cap Oral DAILY    clindamycin (CLEOCIN) capsule 300 mg  300 mg Oral Q6H    methylphenidate HCl (RITALIN) tablet 10 mg  10 mg Oral DAILY    methadone (DOLOPHINE) tablet 10 mg  10 mg Oral BID    haloperidol lactate (HALDOL) injection 5 mg  5 mg IntraMUSCular Q4H PRN    busPIRone (BUSPAR) tablet 5 mg  5 mg Oral BID    sodium chloride (NS) flush 5-40 mL  5-40 mL IntraVENous Q8H    sodium chloride (NS) flush 5-40 mL  5-40 mL IntraVENous PRN    enoxaparin (LOVENOX) injection 40 mg  40 mg SubCUTAneous Q24H    acetaminophen (TYLENOL) tablet 650 mg  650 mg Oral Q6H PRN    ondansetron (ZOFRAN) injection 4 mg  4 mg IntraVENous Q6H PRN    pregabalin (LYRICA) capsule 225 mg  225 mg Oral BID    LORazepam (ATIVAN) tablet 1 mg  1 mg Oral Q8H PRN    HYDROcodone-acetaminophen (NORCO) 5-325 mg per tablet 2 Tab  2 Tab Oral Q4H PRN    carvedilol (COREG) tablet 25 mg  25 mg Oral BID WITH MEALS    DULoxetine (CYMBALTA) capsule 60 mg  60 mg Oral DAILY    polyethylene glycol (MIRALAX) packet 17 g  17 g Oral QID PRN    tiZANidine (ZANAFLEX) tablet 2 mg  2 mg Oral DAILY PRN    buPROPion XL (WELLBUTRIN XL) tablet 450 mg  450 mg Oral 7am    calcium-vitamin D (OS-KRISTINE) 500 mg-200 unit tablet  1 Tab Oral DAILY    lisinopril (PRINIVIL, ZESTRIL) tablet 40 mg  40 mg Oral DAILY    zolpidem (AMBIEN) tablet 10 mg  10 mg Oral QHS PRN        Review of Systems  A comprehensive review of systems was negative except for that written in the HPI.     Objective:     Visit Vitals  /81 (BP 1 Location: Left arm, BP Patient Position: At rest;Supine)   Pulse 89   Temp 98.3 °F (36.8 °C)   Resp 18   Ht 6' 7\" (2.007 m)   Wt 99.8 kg (220 lb)   SpO2 98%   BMI 24.78 kg/m² O2 Device: Room air    Temp (24hrs), Av.5 °F (36.9 °C), Min:98.3 °F (36.8 °C), Max:98.8 °F (37.1 °C)      No intake/output data recorded.  1901 -  0700  In: -   Out: 650 [Urine:650]    Visit Vitals  /81 (BP 1 Location: Left arm, BP Patient Position: At rest;Supine)   Pulse 89   Temp 98.3 °F (36.8 °C)   Resp 18   Ht 6' 7\" (2.007 m)   Wt 99.8 kg (220 lb)   SpO2 98%   BMI 24.78 kg/m²     General:  Alert, cooperative, no distress, appears stated age. Head:  Normocephalic, without obvious abnormality, atraumatic. Eyes:  Conjunctivae/corneas clear. PERRL, EOMs intact. Fundi benign   Ears:  Normal TMs and external ear canals both ears. Nose: Nares normal. Septum midline. Mucosa normal. No drainage or sinus tenderness. Throat: Lips, mucosa, and tongue normal. Teeth and gums normal.   Neck: Supple, symmetrical, trachea midline, no adenopathy, thyroid: no enlargement/tenderness/nodules, no carotid bruit and no JVD. Back:   Symmetric, no curvature. ROM normal. No CVA tenderness. Lungs:   Clear to auscultation bilaterally. Chest wall:  No tenderness or deformity. Heart:  Regular rate and rhythm, S1, S2 normal, no murmur, click, rub or gallop. Abdomen:   Soft, non-tender. Bowel sounds normal. No masses,  No organomegaly. Genitalia:  Normal male without lesion, discharge or tenderness. Rectal:  Normal tone, normal prostate, no masses or tenderness  Guaiac negative stool. Extremities: Extremities normal, atraumatic, no cyanosis or edema. Pulses: 2+ and symmetric all extremities. Skin: Skin color, texture, turgor normal. No rashes or lesions   Lymph nodes: Cervical, supraclavicular, and axillary nodes normal.   Neurologic: CNII-XII intact. Normal strength, sensation and reflexes throughout. Data Review    No results found for this or any previous visit (from the past 24 hour(s)).       Assessment/Plan:     Principal Problem:    Cellulitis (2019)    Active Problems: Abscess (1/14/2019)     left thigh abscess -finish 7 days of anne-marie. Cont wound vac. Waiting on dispo.     djd back and unstable gait - ptot        Hx of asthma/anemia/htn - cont coreg, prinivil.  duonebs prn.                   Care Plan discussed with: Patient/Family    Total time spent with patient: 20 minutes.     Signed By: Vanda Florez DO     January 20, 2019

## 2019-01-20 NOTE — PROGRESS NOTES
Problem: Pressure Injury - Risk of  Goal: *Prevention of pressure injury  Document Romel Scale and appropriate interventions in the flowsheet. Outcome: Progressing Towards Goal  Pressure Injury Interventions:  Sensory Interventions: Assess changes in LOC    Moisture Interventions: Maintain skin hydration (lotion/cream)    Activity Interventions: PT/OT evaluation    Mobility Interventions: HOB 30 degrees or less    Nutrition Interventions: Document food/fluid/supplement intake    Friction and Shear Interventions: Foam dressings/transparent film/skin sealants, HOB 30 degrees or less               Problem: Falls - Risk of  Goal: *Absence of Falls  Document Luther Fall Risk and appropriate interventions in the flowsheet.   Outcome: Progressing Towards Goal  Fall Risk Interventions:  Mobility Interventions: Communicate number of staff needed for ambulation/transfer    Mentation Interventions: Adequate sleep, hydration, pain control    Medication Interventions: Evaluate medications/consider consulting pharmacy    Elimination Interventions: Call light in reach    History of Falls Interventions: Evaluate medications/consider consulting pharmacy

## 2019-01-20 NOTE — PROGRESS NOTES
Problem: Pressure Injury - Risk of  Goal: *Prevention of pressure injury  Document Romel Scale and appropriate interventions in the flowsheet. Outcome: Progressing Towards Goal  Pressure Injury Interventions:  Sensory Interventions: Assess changes in LOC, Monitor skin under medical devices, Pressure redistribution bed/mattress (bed type)    Moisture Interventions: Apply protective barrier, creams and emollients, Absorbent underpads, Minimize layers, Moisture barrier    Activity Interventions: Increase time out of bed, Pressure redistribution bed/mattress(bed type)    Mobility Interventions: Pressure redistribution bed/mattress (bed type)    Nutrition Interventions: Offer support with meals,snacks and hydration    Friction and Shear Interventions: HOB 30 degrees or less, Minimize layers               Problem: Falls - Risk of  Goal: *Absence of Falls  Document Luther Fall Risk and appropriate interventions in the flowsheet. Outcome: Progressing Towards Goal  Fall Risk Interventions:  Mobility Interventions: Communicate number of staff needed for ambulation/transfer, Utilize walker, cane, or other assistive device    Mentation Interventions: Evaluate medications/consider consulting pharmacy    Medication Interventions: Evaluate medications/consider consulting pharmacy    Elimination Interventions: Patient to call for help with toileting needs, Urinal in reach, Call light in reach    History of Falls Interventions:  Investigate reason for fall, Door open when patient unattended, Evaluate medications/consider consulting pharmacy

## 2019-01-21 PROCEDURE — 97530 THERAPEUTIC ACTIVITIES: CPT

## 2019-01-21 PROCEDURE — 74011250637 HC RX REV CODE- 250/637: Performed by: NEUROMUSCULOSKELETAL MEDICINE & OMM

## 2019-01-21 PROCEDURE — 74011250636 HC RX REV CODE- 250/636: Performed by: HOSPITALIST

## 2019-01-21 PROCEDURE — 97535 SELF CARE MNGMENT TRAINING: CPT

## 2019-01-21 PROCEDURE — 99218 HC RM OBSERVATION: CPT

## 2019-01-21 PROCEDURE — 97116 GAIT TRAINING THERAPY: CPT | Performed by: PHYSICAL THERAPIST

## 2019-01-21 PROCEDURE — 97602 WOUND(S) CARE NON-SELECTIVE: CPT

## 2019-01-21 PROCEDURE — 96372 THER/PROPH/DIAG INJ SC/IM: CPT

## 2019-01-21 PROCEDURE — 74011250637 HC RX REV CODE- 250/637: Performed by: PSYCHIATRY & NEUROLOGY

## 2019-01-21 PROCEDURE — G0378 HOSPITAL OBSERVATION PER HR: HCPCS

## 2019-01-21 RX ADMIN — BUPROPION HYDROCHLORIDE 450 MG: 150 TABLET, EXTENDED RELEASE ORAL at 09:07

## 2019-01-21 RX ADMIN — LORAZEPAM 1 MG: 1 TABLET ORAL at 18:21

## 2019-01-21 RX ADMIN — PREGABALIN 225 MG: 75 CAPSULE ORAL at 09:06

## 2019-01-21 RX ADMIN — POLYETHYLENE GLYCOL 3350 17 G: 17 POWDER, FOR SOLUTION ORAL at 10:19

## 2019-01-21 RX ADMIN — METHYLPHENIDATE HYDROCHLORIDE 10 MG: 5 TABLET ORAL at 09:06

## 2019-01-21 RX ADMIN — CARVEDILOL 25 MG: 12.5 TABLET, FILM COATED ORAL at 09:06

## 2019-01-21 RX ADMIN — CLINDAMYCIN HYDROCHLORIDE 300 MG: 150 CAPSULE ORAL at 05:18

## 2019-01-21 RX ADMIN — ENOXAPARIN SODIUM 40 MG: 40 INJECTION, SOLUTION INTRAVENOUS; SUBCUTANEOUS at 09:08

## 2019-01-21 RX ADMIN — TIZANIDINE 2 MG: 4 TABLET ORAL at 09:07

## 2019-01-21 RX ADMIN — DULOXETINE HYDROCHLORIDE 60 MG: 30 CAPSULE, DELAYED RELEASE ORAL at 09:07

## 2019-01-21 RX ADMIN — PREGABALIN 225 MG: 75 CAPSULE ORAL at 18:20

## 2019-01-21 RX ADMIN — METHADONE HYDROCHLORIDE 10 MG: 10 TABLET ORAL at 21:51

## 2019-01-21 RX ADMIN — HYDROCODONE BITARTRATE AND ACETAMINOPHEN 2 TABLET: 5; 325 TABLET ORAL at 15:33

## 2019-01-21 RX ADMIN — HYDROCODONE BITARTRATE AND ACETAMINOPHEN 2 TABLET: 5; 325 TABLET ORAL at 11:18

## 2019-01-21 RX ADMIN — BUSPIRONE HYDROCHLORIDE 5 MG: 5 TABLET ORAL at 09:06

## 2019-01-21 RX ADMIN — HYDROCODONE BITARTRATE AND ACETAMINOPHEN 2 TABLET: 5; 325 TABLET ORAL at 03:46

## 2019-01-21 RX ADMIN — HYDROCODONE BITARTRATE AND ACETAMINOPHEN 2 TABLET: 5; 325 TABLET ORAL at 19:51

## 2019-01-21 RX ADMIN — CALCIUM CARBONATE-VITAMIN D TAB 500 MG-200 UNIT 1 TABLET: 500-200 TAB at 09:07

## 2019-01-21 RX ADMIN — Medication 10 ML: at 21:51

## 2019-01-21 RX ADMIN — Medication 10 ML: at 13:34

## 2019-01-21 RX ADMIN — LORAZEPAM 1 MG: 1 TABLET ORAL at 10:19

## 2019-01-21 RX ADMIN — CARVEDILOL 25 MG: 12.5 TABLET, FILM COATED ORAL at 16:54

## 2019-01-21 RX ADMIN — LISINOPRIL 40 MG: 20 TABLET ORAL at 09:06

## 2019-01-21 RX ADMIN — METHADONE HYDROCHLORIDE 10 MG: 10 TABLET ORAL at 09:06

## 2019-01-21 RX ADMIN — BUSPIRONE HYDROCHLORIDE 5 MG: 5 TABLET ORAL at 18:21

## 2019-01-21 RX ADMIN — Medication 10 ML: at 05:19

## 2019-01-21 RX ADMIN — Medication 1 CAPSULE: at 09:06

## 2019-01-21 RX ADMIN — CLINDAMYCIN HYDROCHLORIDE 300 MG: 150 CAPSULE ORAL at 18:21

## 2019-01-21 RX ADMIN — CLINDAMYCIN HYDROCHLORIDE 300 MG: 150 CAPSULE ORAL at 13:34

## 2019-01-21 NOTE — PROGRESS NOTES
Bedside shift change report given to [address] [facility],[address] [address] (oncoming nurse) by me (offgoing nurse). Report included the following information SBAR, Kardex, Intake/Output, MAR and Recent Results.

## 2019-01-21 NOTE — PROGRESS NOTES
Bedside shift change report given to me (oncoming nurse) by Brandon Dorsey (offgoing nurse). Report included the following information SBAR, Kardex, Intake/Output, MAR and Recent Results.

## 2019-01-21 NOTE — PROGRESS NOTES
3636) Bedside shift change report given to Kaden Cohen RN (oncoming nurse) by Giuseppe Ventura RN (offgoing nurse). Report included the following information SBAR, Kardex, MAR, Accordion and Recent Results. 1120) physical therapy with pt.   1214)wound care with Peter Reed RN  1230) pt request cleocin after lunch  1536) Velvet with care manager. 1930) Bedside shift change report given to Damaris Dickerson RN (oncoming nurse) by Kaden Cohen RN (offgoing nurse). Report included the following information SBAR, Kardex, Intake/Output and MAR.

## 2019-01-21 NOTE — WOUND CARE
Wound care  Revisited the wound and changed the wound VAC. Wound VAC removed the wound dressing wound starated granulating there is slight slough over the facia.  Wound otherwise looks clean

## 2019-01-21 NOTE — PROGRESS NOTES
Continue to follow for discharge planning. Discussed with MD and Team.  Talked briefly with patient and will follow-up again this afternoon. Trying to clarify safe discharge. Wound Care Nurse to look at wound today and assess for continue wound care-options at this time. Observed with PT today. Cely Rittermehnazmarizol Vazquez and they will review updates and call the Insurance company- ? If insurance approval. They will call me back     Called [employer]  They had additional question  Ext [phone]. Due to Medicare requirements. Also had questions about fail treatment options and whether the insurance company will provide coverage. This will take longer for an Authorization. Working on calling some other [address] [employer] [facility] to see if they will accept. It looks like our option will be Home with Daily wound care in the [place]. Darlene VENEGAS RN   [phone]      Addendum: 2:30PM   Met with patient today. The Representative from [facility] also met with patient. Left information for him to review. He is also aware this will depend on approval from the insurance co.    Patient indicates he cannot make any decisions until is he assured his pain medications are given to him. We discussed again the process. He is aware the hospitalist will not be able to provide prescriptions for all of these medications especially the Methadone. They may consider a few days of his other medications until he is able to get into the specialist office. He kept saying he just has to call and go into the office to get them filled. He finally agreed to have us make and appointment and call the office to determine how they will decide to dispense the medications. The office was closed today. Still unable to reach the . It is always request to leave a message from the person who answered the phone. Today is a holiday.  Will call one of the sister's facilities to see if they can pass a message to her. The # of the  from the insurance co patient said is working with him. We called her and left messages no calls back yet. He had another call he gave me today of a person who he said had made appointments for him prior to coming in the hospital. .  Ms. Michelle Eason  [phone]. She is the  for [employer] not the Medicaid. She said she is the one that has been helping him with making appointments. All of them were cancel since he has been in the hospital.     To follow-up to see if we can reschedule these appointments. Dr. Antonio Pichardo  [phone] for [facility], Dr. Elena Witt or Lori Connell for PCP. - Pain Specialist  Dr. Erik Davis. [phone].

## 2019-01-21 NOTE — PROGRESS NOTES
Problem: Self Care Deficits Care Plan (Adult)  Goal: *Acute Goals and Plan of Care (Insert Text)  Occupational Therapy Goals  Initiated 1/15/2019; Reviewed 1/21/19 for weekly reassessment, continue all  1. Patient will perform grooming with supervision/set-up in standing >3 minutes within 7 day(s). ; 1/21- stood 3.5 mins with CGA  2. Patient will perform lower body dressing with modified independence within 7 day(s). 3.  Patient will perform toilet transfers with modified independence within 7 day(s). 4.  Patient will perform all aspects of toileting with modified independence within 7 day(s). 5.  Patient will participate in upper extremity therapeutic exercise/activities with independence for 5 minutes within 7 day(s). 6.  Patient will utilize energy conservation techniques during functional activities with verbal cues within 7 day(s). Occupational Therapy TREATMENT: WEEKLY REASSESSMENT  Patient: Carlee Anderson [de-identified]77 y.o. male)  Date: 1/21/2019  Diagnosis: Cellulitis Cellulitis  Procedure(s) (LRB):  INCISION AND DRAINAGE LEFT THIGH ABSCESS (Left) 7 Days Post-Op  Precautions:    Chart, occupational therapy assessment, plan of care, and goals were reviewed. ASSESSMENT:  Cleared by RN to see pt for therapy session. Pt received supine in bed, agreeable to participate. Transferred supine>sit mod I, good sitting balance and able to use leg crossover technique to don socks while seated EOB with supervision. Stood to [employer] with supervision and ambulated to sink. Stood approx 3.5 mins at sink while brushing teeth with CGA, brief periods of standing unsupported but majority of task with 1 UE support. Reported feeling fatigued and increased back and LE pain requiring several minute seated rest break before standing additional minute to complete task. No LOB observed during standing ADLs or mobility in room.   In sitting EOB pt participated in UE exercise and trunk exercise/stretching to increase strength and relieve back and neck pain. Returned to semisupine at end of session with mod I. Pt progressing towards goals, none met at this time, functional performance limited by decreased endurance, strength and back and LE pain. Di Roth He will benefit from continued OT to address the above deficits. Recommend HHOT at discharge. Progression toward goals:  [x]            Improving appropriately and progressing toward goals  []            Improving slowly and progressing toward goals  []            Not making progress toward goals and plan of care will be adjusted     PLAN:  Goals have been updated based on progression since last assessment. Patient continues to benefit from skilled intervention to address the above impairments. Continue to follow patient 2 times a week to address goals. Planned Interventions:  [x]                    Self Care Training                  [x]             Therapeutic Activities  [x]                    Functional Mobility Training    []             Cognitive Retraining  [x]                    Therapeutic Exercises           [x]             Endurance Activities  [x]                    Balance Training                   []             Neuromuscular Re-Education  []                    Visual/Perceptual Training     [x]        Home Safety Training  [x]                    Patient Education                 [x]             Family Training/Education  []                    Other (comment):  Discharge Recommendations: Home Health  Further Equipment Recommendations for Discharge: TBD     SUBJECTIVE:   Patient stated I do these stretches for my neck at home.     OBJECTIVE DATA SUMMARY:   Cognitive/Behavioral Status:  Neurologic State: Alert  Orientation Level: Oriented X4  Cognition: Follows commands  Perception: Appears intact  Perseveration: No perseveration noted  Safety/Judgement: Awareness of environment; Fall prevention;Home safety    Functional Mobility and Transfers for ADLs:  Bed Mobility:  Supine to Sit: Modified independent  Sit to Supine: Modified independent  Scooting: Modified independent    Transfers:  Sit to Stand: Modified independent           Balance:  Sitting: Intact  Standing: Impaired; With support  Standing - Static: Good  Standing - Dynamic : Fair    ADL Intervention:       Grooming  Grooming Assistance: Contact guard assistance(standing at sink, required seated rest break after 3.5 mins)  Washing Hands: Contact guard assistance  Brushing Teeth: Contact guard assistance(standing at sink)   Discussed energy conservation techniques to use during ADLs such as taking seated rest break and pacing activity. Also discussed importance of building standing tolerance for increased independence with standing ADLs. Lower Body Dressing Assistance  Socks: Supervision/set-up  Leg Crossed Method Used: Yes  Position Performed: Seated edge of bed     Therapeutic listening provided as pt discussed his chronic pain and anxiety following activity. Cognitive Retraining  Safety/Judgement: Awareness of environment; Fall prevention;Home safety    Therapeutic Exercises:   1x10 shoulder flex, 2x10 lat pulls, 1x5 B knee extension, 1x5 B side bending exercises to build strength and relieve back pain  Pain:  Pain Scale 1: Numeric (0 - 10)  Pain Intensity 1: 8  Pain Location 1: Shoulder;Back;Neck  Pain Orientation 1: Lower  Pain Description 1: Throbbing  Pain Intervention(s) 1: Medication (see MAR)  Activity Tolerance:   Good  Please refer to the flowsheet for vital signs taken during this treatment.   After treatment:   [] Patient left in no apparent distress sitting up in chair  [x] Patient left in no apparent distress in bed  [x] Call bell left within reach  [x] Nursing notified  [] Caregiver present  [x] Bed alarm activated    COMMUNICATION/COLLABORATION:   The patients plan of care was discussed with: Physical Therapist and Registered Nurse    Donnie Grider OT  Time Calculation: 48 mins

## 2019-01-21 NOTE — PROGRESS NOTES
Problem: Pressure Injury - Risk of  Goal: *Prevention of pressure injury  Document Romel Scale and appropriate interventions in the flowsheet. Outcome: Progressing Towards Goal  Pressure Injury Interventions:  Sensory Interventions: Assess changes in LOC    Moisture Interventions: Maintain skin hydration (lotion/cream)    Activity Interventions: Increase time out of bed    Mobility Interventions: HOB 30 degrees or less    Nutrition Interventions: Document food/fluid/supplement intake    Friction and Shear Interventions: HOB 30 degrees or less               Problem: Falls - Risk of  Goal: *Absence of Falls  Document Luther Fall Risk and appropriate interventions in the flowsheet.   Outcome: Progressing Towards Goal  Fall Risk Interventions:  Mobility Interventions: Communicate number of staff needed for ambulation/transfer    Mentation Interventions: Evaluate medications/consider consulting pharmacy    Medication Interventions: Evaluate medications/consider consulting pharmacy    Elimination Interventions: Call light in reach    History of Falls Interventions: Door open when patient unattended

## 2019-01-21 NOTE — PROGRESS NOTES
Hospitalist Progress Note    Subjective:   Daily Progress Note: 1/21/2019 9:42 AM    No reports of pain or chills. Current Facility-Administered Medications   Medication Dose Route Frequency    lactobac ac& pc-s.therm-b.anim (DRE Q/RISAQUAD)  1 Cap Oral DAILY    clindamycin (CLEOCIN) capsule 300 mg  300 mg Oral Q6H    methylphenidate HCl (RITALIN) tablet 10 mg  10 mg Oral DAILY    methadone (DOLOPHINE) tablet 10 mg  10 mg Oral BID    haloperidol lactate (HALDOL) injection 5 mg  5 mg IntraMUSCular Q4H PRN    busPIRone (BUSPAR) tablet 5 mg  5 mg Oral BID    sodium chloride (NS) flush 5-40 mL  5-40 mL IntraVENous Q8H    sodium chloride (NS) flush 5-40 mL  5-40 mL IntraVENous PRN    enoxaparin (LOVENOX) injection 40 mg  40 mg SubCUTAneous Q24H    acetaminophen (TYLENOL) tablet 650 mg  650 mg Oral Q6H PRN    ondansetron (ZOFRAN) injection 4 mg  4 mg IntraVENous Q6H PRN    pregabalin (LYRICA) capsule 225 mg  225 mg Oral BID    LORazepam (ATIVAN) tablet 1 mg  1 mg Oral Q8H PRN    HYDROcodone-acetaminophen (NORCO) 5-325 mg per tablet 2 Tab  2 Tab Oral Q4H PRN    carvedilol (COREG) tablet 25 mg  25 mg Oral BID WITH MEALS    DULoxetine (CYMBALTA) capsule 60 mg  60 mg Oral DAILY    polyethylene glycol (MIRALAX) packet 17 g  17 g Oral QID PRN    tiZANidine (ZANAFLEX) tablet 2 mg  2 mg Oral DAILY PRN    buPROPion XL (WELLBUTRIN XL) tablet 450 mg  450 mg Oral 7am    calcium-vitamin D (OS-KRISTINE) 500 mg-200 unit tablet  1 Tab Oral DAILY    lisinopril (PRINIVIL, ZESTRIL) tablet 40 mg  40 mg Oral DAILY    zolpidem (AMBIEN) tablet 10 mg  10 mg Oral QHS PRN        Review of Systems  A comprehensive review of systems was negative except for that written in the HPI.     Objective:     Visit Vitals  /85 (BP 1 Location: Left arm, BP Patient Position: At rest)   Pulse 95   Temp 97.9 °F (36.6 °C)   Resp 16   Ht 6' 7\" (2.007 m)   Wt 99.8 kg (220 lb)   SpO2 99%   BMI 24.78 kg/m²      O2 Device: Room air    Temp (24hrs), Av.3 °F (36.8 °C), Min:97.8 °F (36.6 °C), Max:98.9 °F (37.2 °C)      No intake/output data recorded.  1901 -  0700  In: -   Out: 675 [Urine:650; Drains:25]    Visit Vitals  /85 (BP 1 Location: Left arm, BP Patient Position: At rest)   Pulse 95   Temp 97.9 °F (36.6 °C)   Resp 16   Ht 6' 7\" (2.007 m)   Wt 99.8 kg (220 lb)   SpO2 99%   BMI 24.78 kg/m²     General:  Alert, cooperative, no distress, appears stated age. Head:  Normocephalic, without obvious abnormality, atraumatic. Eyes:  Conjunctivae/corneas clear. PERRL, EOMs intact. Fundi benign   Ears:  Normal TMs and external ear canals both ears. Nose: Nares normal. Septum midline. Mucosa normal. No drainage or sinus tenderness. Throat: Lips, mucosa, and tongue normal. Teeth and gums normal.   Neck: Supple, symmetrical, trachea midline, no adenopathy, thyroid: no enlargement/tenderness/nodules, no carotid bruit and no JVD. Back:   Symmetric, no curvature. ROM normal. No CVA tenderness. Lungs:   Clear to auscultation bilaterally. Chest wall:  No tenderness or deformity. Heart:  Regular rate and rhythm, S1, S2 normal, no murmur, click, rub or gallop. Extremities: Extremities normal, atraumatic, no cyanosis or edema. Pulses: 2+ and symmetric all extremities. Skin: Skin color, texture, turgor normal. No rashes or lesions   Lymph nodes: Cervical, supraclavicular, and axillary nodes normal.   Neurologic: CNII-XII intact. Normal strength, sensation and reflexes throughout. Additional comments:None    Data Review    No results found for this or any previous visit (from the past 24 hour(s)). Assessment/Plan:     Principal Problem:    Cellulitis (2019)    Active Problems:    Abscess (2019)    left thigh abscess -finish 7 days of anne-marie.   wound vac per wound care.     djd back and unstable gait - ptot        Hx of asthma/anemia/htn - cont coreg, prinivil.  duonebs prn.                   Care Plan discussed with: Patient/Family    Total time spent with patient: 20 minutes.     Signed By: Victor Hugo Sanders DO     January 21, 2019

## 2019-01-21 NOTE — PROGRESS NOTES
Problem: Mobility Impaired (Adult and Pediatric)  Goal: *Acute Goals and Plan of Care (Insert Text)  Physical Therapy Goals  Initiated 1/14/2019  1. Patient will move from supine to sit and sit to supine  in bed with modified independence within 7 day(s). Met 1/21/19  2. Patient will transfer from bed to chair and chair to bed with supervision/set-up using the least restrictive device within 7 day(s). Met 1/21/19  3. Patient will perform sit to stand with supervision/set-up within 7 day(s). Met 1/21/19  4. Patient will ambulate with contact guard assist for 200 feet with the least restrictive device within 7 day(s). Partially MET    Physical Therapy Goals  Revised 1/21/19  1. Patient will transfer from bed to chair and chair to bed with modified independence using the least restrictive device within 7 day(s). 2.  Patient will ambulate with modified independence for 200 feet with the least restrictive device within 7 day(s). physical Therapy TREATMENT  Patient: Bhumi Rodriguez (17 y.o. male)  Date: 1/21/2019  Diagnosis: Cellulitis Cellulitis  Procedure(s) (LRB):  INCISION AND DRAINAGE LEFT THIGH ABSCESS (Left) 7 Days Post-Op  Precautions:    Chart, physical therapy assessment, plan of care and goals were reviewed. ASSESSMENT:  Patient received supine in bed and agreeable to therapy. Patient performed bed mobility with modified independence. Patient stood to walker with modified independence. Patient ambulated 150 feet with walker and stand-by assistance. Patient with overall steady gait and no loss of balance. Patient continues to demonstrate limited endurance. Patient encouraged to continue supine and seated exercises for further strengthening. Recommend rollator walker and home health at discharge.      Progression toward goals:  [x]    Improving appropriately and progressing toward goals  []    Improving slowly and progressing toward goals  []    Not making progress toward goals and plan of care will be adjusted     PLAN:  Patient continues to benefit from skilled intervention to address the above impairments. Continue treatment per established plan of care. Discharge Recommendations:  Home Health  Further Equipment Recommendations for Discharge:  rollator     SUBJECTIVE:   Patient stated I'm ready to go home.     OBJECTIVE DATA SUMMARY:   Critical Behavior:  Neurologic State: Alert  Orientation Level: Oriented X4  Cognition: Appropriate decision making, Appropriate for age attention/concentration, Appropriate safety awareness, Follows commands  Safety/Judgement: Awareness of environment, Fall prevention, Home safety     Functional Mobility Training:  Bed Mobility:  Supine to Sit: Modified independent  Sit to Supine: Modified independent  Scooting: Modified independent     Transfers:  Sit to Stand: Modified independent  Stand to Sit: Modified independent     Balance:  Sitting: Intact  Standing: Impaired; With support  Standing - Static: Good  Standing - Dynamic : Fair     Ambulation/Gait Training:  Distance (ft): 150 Feet (ft)  Assistive Device: Gait belt;Walker, rolling  Ambulation - Level of Assistance: Stand-by assistance  Gait Abnormalities: Decreased step clearance  Base of Support: Narrowed  Speed/Renea: Pace decreased (<100 feet/min)  Step Length: Right shortened;Left shortened    Pain:  Pain Scale 1: Numeric (0 - 10)  Pain Intensity 1: 7  Pain Location 1: Shoulder;Back;Neck  Pain Orientation 1: Lower  Pain Description 1: Throbbing  Pain Intervention(s) 1: Medication (see MAR)     Activity Tolerance:   Good  Please refer to the flowsheet for vital signs taken during this treatment.   After treatment:   []    Patient left in no apparent distress sitting up in chair  [x]    Patient left in no apparent distress in bed  [x]    Call bell left within reach  [x]    Nursing notified  []    Caregiver present  []    Bed alarm activated    COMMUNICATION/COLLABORATION:   The patients plan of care was discussed with: Registered Nurse    Ioana Barajas, PT   Time Calculation: 15 mins clear

## 2019-01-22 PROCEDURE — 97116 GAIT TRAINING THERAPY: CPT | Performed by: PHYSICAL THERAPIST

## 2019-01-22 PROCEDURE — 99218 HC RM OBSERVATION: CPT

## 2019-01-22 PROCEDURE — 74011250636 HC RX REV CODE- 250/636: Performed by: HOSPITALIST

## 2019-01-22 PROCEDURE — 96372 THER/PROPH/DIAG INJ SC/IM: CPT

## 2019-01-22 PROCEDURE — G0378 HOSPITAL OBSERVATION PER HR: HCPCS

## 2019-01-22 PROCEDURE — 74011250637 HC RX REV CODE- 250/637: Performed by: NEUROMUSCULOSKELETAL MEDICINE & OMM

## 2019-01-22 PROCEDURE — 74011250637 HC RX REV CODE- 250/637: Performed by: PSYCHIATRY & NEUROLOGY

## 2019-01-22 RX ADMIN — ENOXAPARIN SODIUM 40 MG: 40 INJECTION, SOLUTION INTRAVENOUS; SUBCUTANEOUS at 09:20

## 2019-01-22 RX ADMIN — CLINDAMYCIN HYDROCHLORIDE 300 MG: 150 CAPSULE ORAL at 00:35

## 2019-01-22 RX ADMIN — METHADONE HYDROCHLORIDE 10 MG: 10 TABLET ORAL at 09:20

## 2019-01-22 RX ADMIN — Medication 1 CAPSULE: at 09:20

## 2019-01-22 RX ADMIN — LORAZEPAM 1 MG: 1 TABLET ORAL at 20:09

## 2019-01-22 RX ADMIN — BUSPIRONE HYDROCHLORIDE 5 MG: 5 TABLET ORAL at 17:45

## 2019-01-22 RX ADMIN — POLYETHYLENE GLYCOL 3350 17 G: 17 POWDER, FOR SOLUTION ORAL at 12:29

## 2019-01-22 RX ADMIN — PREGABALIN 225 MG: 75 CAPSULE ORAL at 17:46

## 2019-01-22 RX ADMIN — Medication 10 ML: at 12:33

## 2019-01-22 RX ADMIN — CLINDAMYCIN HYDROCHLORIDE 300 MG: 150 CAPSULE ORAL at 05:50

## 2019-01-22 RX ADMIN — ZOLPIDEM TARTRATE 10 MG: 5 TABLET ORAL at 00:34

## 2019-01-22 RX ADMIN — TIZANIDINE 2 MG: 4 TABLET ORAL at 09:19

## 2019-01-22 RX ADMIN — Medication 10 ML: at 05:50

## 2019-01-22 RX ADMIN — CARVEDILOL 25 MG: 12.5 TABLET, FILM COATED ORAL at 16:06

## 2019-01-22 RX ADMIN — HYDROCODONE BITARTRATE AND ACETAMINOPHEN 2 TABLET: 5; 325 TABLET ORAL at 16:06

## 2019-01-22 RX ADMIN — METHADONE HYDROCHLORIDE 10 MG: 10 TABLET ORAL at 21:04

## 2019-01-22 RX ADMIN — HYDROCODONE BITARTRATE AND ACETAMINOPHEN 2 TABLET: 5; 325 TABLET ORAL at 21:03

## 2019-01-22 RX ADMIN — Medication 10 ML: at 21:04

## 2019-01-22 RX ADMIN — CARVEDILOL 25 MG: 12.5 TABLET, FILM COATED ORAL at 09:20

## 2019-01-22 RX ADMIN — BUSPIRONE HYDROCHLORIDE 5 MG: 5 TABLET ORAL at 09:20

## 2019-01-22 RX ADMIN — BUPROPION HYDROCHLORIDE 450 MG: 150 TABLET, EXTENDED RELEASE ORAL at 09:20

## 2019-01-22 RX ADMIN — PREGABALIN 225 MG: 75 CAPSULE ORAL at 09:20

## 2019-01-22 RX ADMIN — HYDROCODONE BITARTRATE AND ACETAMINOPHEN 2 TABLET: 5; 325 TABLET ORAL at 00:23

## 2019-01-22 RX ADMIN — HYDROCODONE BITARTRATE AND ACETAMINOPHEN 2 TABLET: 5; 325 TABLET ORAL at 05:50

## 2019-01-22 RX ADMIN — METHYLPHENIDATE HYDROCHLORIDE 10 MG: 5 TABLET ORAL at 09:20

## 2019-01-22 RX ADMIN — CALCIUM CARBONATE-VITAMIN D TAB 500 MG-200 UNIT 1 TABLET: 500-200 TAB at 09:20

## 2019-01-22 RX ADMIN — DULOXETINE HYDROCHLORIDE 60 MG: 30 CAPSULE, DELAYED RELEASE ORAL at 09:20

## 2019-01-22 RX ADMIN — LORAZEPAM 1 MG: 1 TABLET ORAL at 12:14

## 2019-01-22 RX ADMIN — LISINOPRIL 40 MG: 20 TABLET ORAL at 09:20

## 2019-01-22 RX ADMIN — HYDROCODONE BITARTRATE AND ACETAMINOPHEN 2 TABLET: 5; 325 TABLET ORAL at 12:32

## 2019-01-22 NOTE — PROGRESS NOTES
Problem: Falls - Risk of  Goal: *Absence of Falls  Document Luther Fall Risk and appropriate interventions in the flowsheet.   Outcome: Progressing Towards Goal  Fall Risk Interventions:  Mobility Interventions: Patient to call before getting OOB    Mentation Interventions: Adequate sleep, hydration, pain control, Update white board    Medication Interventions: Teach patient to arise slowly    Elimination Interventions: Call light in reach, Urinal in reach, Patient to call for help with toileting needs    History of Falls Interventions: Door open when patient unattended

## 2019-01-22 NOTE — PROGRESS NOTES
Bedside shift change report given to EVELIN Echeverria (oncoming nurse) by Idris Florez (offgoing nurse). Report included the following information SBAR, Kardex, Intake/Output, MAR and Recent Results.

## 2019-01-22 NOTE — INTERDISCIPLINARY ROUNDS
IDR with Dr. Yamilex LEWIS), Burak Drummond (pharmacist), Tanisha Palacio (), Jaya Serrano (nurse manager), Rosalva Kenyon (infecton control), and Apoorva Block (RN) to discuss plan of care including wound care today

## 2019-01-22 NOTE — PROGRESS NOTES
Problem: Pressure Injury - Risk of  Goal: *Prevention of pressure injury  Document Romel Scale and appropriate interventions in the flowsheet. Outcome: Progressing Towards Goal  Pressure Injury Interventions:  Sensory Interventions: Assess changes in LOC    Moisture Interventions: Maintain skin hydration (lotion/cream)    Activity Interventions: Increase time out of bed    Mobility Interventions: HOB 30 degrees or less    Nutrition Interventions: Document food/fluid/supplement intake    Friction and Shear Interventions: HOB 30 degrees or less               Problem: Falls - Risk of  Goal: *Absence of Falls  Document Luther Fall Risk and appropriate interventions in the flowsheet.   Outcome: Progressing Towards Goal  Fall Risk Interventions:  Mobility Interventions: Patient to call before getting OOB    Mentation Interventions: Evaluate medications/consider consulting pharmacy    Medication Interventions: Evaluate medications/consider consulting pharmacy, Teach patient to arise slowly    Elimination Interventions: Call light in reach, Patient to call for help with toileting needs    History of Falls Interventions: Door open when patient unattended

## 2019-01-22 NOTE — PROGRESS NOTES
Follow Up Nutrition Assessment:     INTERVENTIONS/RECOMMENDATIONS: ·    diet as tolerated, will change order to high fiber to aid with constipation issues. ·  high marline high protein for nutrients and wound healing  · Supplements: Commercial supplement  ·  Ensure Enlive lunch trays  ·  Vit C 500 mg/day, zinc 15 mg/day for wound healing  ·  Push fluid intake for help with constipation.     ASSESSMENT:   MD consult noted. Pt concerned about constipation with use of supplements. Supplement contains a fair amount of fiber, and it pt is consuming sufficient fluids this should help with constipation. Please encourage pt to drink fluids with meals and in between meals. If pt not using supplement please discontinue. Pt admitted with cellulitis, abscess L leg, s/p I&D, reports less pain. Wound vac in place. Tolerating diet with no concerns. .  Hx notable for constipation, HTN, dyslipidemia.     Diet Order: Regular  % Eaten:  No data found. Pertinent Medications: [x]Reviewed []Other  Pertinent Labs: [x]Reviewed []Other  Food Allergies: []None []Other   Last BM:           [x]Active     []Hyperactive  []Hypoactive       [] Absent BS  Skin:     [] Intact              [] Incision           [x] Breakdown                [] Other:     Anthropometrics:   Height: 6' 7\" (200.7 cm)         Weight: 99.8 kg (220 lb)          IBW (%IBW): 99.8 kg (220 lb) (100 %)          UBW (%UBW):   (  %)   Last Weight Metrics:  Weight Loss Metrics 1/22/2019 6/28/2018 9/8/2017 1/31/2017 6/21/2016 12/22/2015 3/10/2015   Today's Wt 220 lb 222 lb 223 lb 223 lb 225 lb 231 lb 227 lb   BMI 24.78 kg/m2 25.01 kg/m2 25.12 kg/m2 25.12 kg/m2 25.34 kg/m2 26.01 kg/m2 25.56 kg/m2         BMI: Body mass index is 24.78 kg/m².              This BMI is indicative of:   []Underweight    [x]Normal    []Overweight    [] Obesity   [] Extreme Obesity (BMI>40)      Estimated Nutrition Needs (Based on):   2500 Kcals/day(2205 x 1.2 AF) , 93.75 g(1 g/kg) Protein  Carbohydrate: At Least 130 g/day  Fluids: 2500 mL/day (1ml/kcal)     NUTRITION DIAGNOSES:   Problem:  Altered nutrition-related lab values      Etiology: related to poor diet, poor compliance     Signs/Symptoms: as evidenced by labs, wound       NUTRITION INTERVENTIONS:      Supplements: Commercial supplement              GOAL:   PO intake > 75% most meals follow up 4-6 days. Previous goal met.     LEARNING NEEDS (Diet, Food/Nutrient-Drug Interaction):    [x] None Identified   [] Identified and Education Provided/Documented   [] Identified and Pt declined/was not appropriate     Cultureal, Jain, OR Ethnic Dietary Needs:    [x] None Identified   [] Identified and Addressed      [x] Interdisciplinary Care Plan Reviewed/Documented    [x] Discharge Planning:   Healthful diet with supplements     MONITORING /EVALUATION:   Behavioral-Environmental Outcomes: Readiness to change  Food/Nutrient Intake Outcomes:  Total energy intake, Oral fluids amount  Physical Signs/Symptoms Outcomes: Electrolyte and renal profile     NUTRITION RISK:    [x] Patient At Nutritional Risk     mild   [] Patient Not At Nutritional Risk     PT SEEN FOR:    [x]  MD Consult: []Calorie Count                                       []Diabetic Diet Education                                                                        []Diet Education                                      []Electrolyte Management                                      [x]General Nutrition Management and Supplements                                      []Management of Tube Feeding                                      []TPN Recommendations    [x]  RN Referral:             []MST score >=2                                      []Enteral/Parenteral Nutrition PTA                                      []Pregnant: Gestational DM or Multigestation                                      []Pressure Ulcer/Wound Care needs                                         []  Low BMI  [] STEVE Rosa, PhD, [address], [address] [place]   Pager [phone]

## 2019-01-22 NOTE — PROGRESS NOTES
Patient concern about apartment. CM made multiple phone call to apartment complex to speak to Ms. Blanca Morales unable to reach her by phone. CM drove to patient resident. Spoke with Ms. Recinos inform patient at [facility]. Concern about a pot of beans on the stove would like someone to enter the apartment and throw the beans away. Ms. Blanca Morales states she cannot enter his apartment and throw away anything without the patient being there. Inform her he does not have his keys patient state he left his keys in the house when EMS came. 1425: CM went to speak with patient. Patient wanted writer to inform MD to call old PCP to confirm of a medication he has been taken for 10 years to discuss how important that medication is for his diagnoses. Patient ask CM to inform Dr. Delon Bender he has Stenosis and was seeing by Dr. Kirill Sampson at [facility]. And specifically wants him to call to get information and advise on how to treat his Stenosis patient states he has a specific type of stenosis and not sure is Delon Bender is aware. CM gave patient dentist list for patient to contact and schedule visit once out of the hospital.    [phone]: CM call [facility] [phone] ext [phone] representative confirm re-fax received and  processing paperwork if any additional paperwork is needed they will reach to CM. Behavior health providers getting strict with missed appointments. CM called to re-schedule patient appointment. Per MD patient had a scheduled appointment a nocall/noshow. Unable to accept patient at this time.      [address]  [phone]

## 2019-01-22 NOTE — PROGRESS NOTES
Hospitalist Progress Note    Subjective:   Daily Progress Note: 2019 10:53 AM    No reports of fevers. Tolerating wound vac without pain. Current Facility-Administered Medications   Medication Dose Route Frequency    lactobac ac& pc-s.therm-b.anim (DRE Q/RISAQUAD)  1 Cap Oral DAILY    methylphenidate HCl (RITALIN) tablet 10 mg  10 mg Oral DAILY    methadone (DOLOPHINE) tablet 10 mg  10 mg Oral BID    haloperidol lactate (HALDOL) injection 5 mg  5 mg IntraMUSCular Q4H PRN    busPIRone (BUSPAR) tablet 5 mg  5 mg Oral BID    sodium chloride (NS) flush 5-40 mL  5-40 mL IntraVENous Q8H    sodium chloride (NS) flush 5-40 mL  5-40 mL IntraVENous PRN    enoxaparin (LOVENOX) injection 40 mg  40 mg SubCUTAneous Q24H    acetaminophen (TYLENOL) tablet 650 mg  650 mg Oral Q6H PRN    ondansetron (ZOFRAN) injection 4 mg  4 mg IntraVENous Q6H PRN    pregabalin (LYRICA) capsule 225 mg  225 mg Oral BID    LORazepam (ATIVAN) tablet 1 mg  1 mg Oral Q8H PRN    HYDROcodone-acetaminophen (NORCO) 5-325 mg per tablet 2 Tab  2 Tab Oral Q4H PRN    carvedilol (COREG) tablet 25 mg  25 mg Oral BID WITH MEALS    DULoxetine (CYMBALTA) capsule 60 mg  60 mg Oral DAILY    polyethylene glycol (MIRALAX) packet 17 g  17 g Oral QID PRN    tiZANidine (ZANAFLEX) tablet 2 mg  2 mg Oral DAILY PRN    buPROPion XL (WELLBUTRIN XL) tablet 450 mg  450 mg Oral 7am    calcium-vitamin D (OS-KRISTINE) 500 mg-200 unit tablet  1 Tab Oral DAILY    lisinopril (PRINIVIL, ZESTRIL) tablet 40 mg  40 mg Oral DAILY    zolpidem (AMBIEN) tablet 10 mg  10 mg Oral QHS PRN        Review of Systems  A comprehensive review of systems was negative except for that written in the HPI.     Objective:     Visit Vitals  /74 (BP 1 Location: Left arm, BP Patient Position: At rest;Supine)   Pulse 91   Temp 97.9 °F (36.6 °C)   Resp 18   Ht 6' 7\" (2.007 m)   Wt 99.8 kg (220 lb)   SpO2 99%   BMI 24.78 kg/m²      O2 Device: Room air    Temp (24hrs), Av.1 °F (36.7 °C), Min:97.9 °F (36.6 °C), Max:98.5 °F (36.9 °C)      No intake/output data recorded. 01/20 1901 - 01/22 0700  In: 360 [P.O.:360]  Out: 705 [Urine:700; Drains:5]    Visit Vitals  /74 (BP 1 Location: Left arm, BP Patient Position: At rest;Supine)   Pulse 91   Temp 97.9 °F (36.6 °C)   Resp 18   Ht 6' 7\" (2.007 m)   Wt 99.8 kg (220 lb)   SpO2 99%   BMI 24.78 kg/m²     General:  Alert, cooperative, no distress, appears stated age. Head:  Normocephalic, without obvious abnormality, atraumatic. Eyes:  Conjunctivae/corneas clear. PERRL, EOMs intact. Fundi benign   Ears:  Normal TMs and external ear canals both ears. Nose: Nares normal. Septum midline. Mucosa normal. No drainage or sinus tenderness. Throat: Lips, mucosa, and tongue normal. Teeth and gums normal.   Neck: Supple, symmetrical, trachea midline, no adenopathy, thyroid: no enlargement/tenderness/nodules, no carotid bruit and no JVD. Back:   Symmetric, no curvature. ROM normal. No CVA tenderness. Lungs:   Clear to auscultation bilaterally. Chest wall:  No tenderness or deformity. Heart:  Regular rate and rhythm, S1, S2 normal, no murmur, click, rub or gallop. Extremities: Extremities normal, atraumatic, no cyanosis or edema. Pulses: 2+ and symmetric all extremities. Skin: Skin color, texture, turgor normal. No rashes or lesions   Lymph nodes: Cervical, supraclavicular, and axillary nodes normal.   Neurologic: CNII-XII intact. Normal strength, sensation and reflexes throughout. Additional comments:None    Data Review    No results found for this or any previous visit (from the past 24 hour(s)).       Assessment/Plan:     Principal Problem:    Cellulitis (1/13/2019)    Active Problems:    Abscess (1/14/2019)    left thigh abscess -finished 7 days of anne-marie.  may come off of wound vac this week and then possibley home or snf.     djd back and unstable gait - ptot        Hx of asthma/anemia/htn - cont coreg, prinivil.  duonebs prn.             Care Plan discussed with: Patient/Family    Total time spent with patient: 20 minutes.     Signed By: Brett Hahn DO     January 22, 2019

## 2019-01-22 NOTE — PROGRESS NOTES
Problem: Mobility Impaired (Adult and Pediatric)  Goal: *Acute Goals and Plan of Care (Insert Text)  Physical Therapy Goals  Initiated 1/14/2019  1. Patient will move from supine to sit and sit to supine  in bed with modified independence within 7 day(s). Met 1/21/19  2. Patient will transfer from bed to chair and chair to bed with supervision/set-up using the least restrictive device within 7 day(s). Met 1/21/19  3. Patient will perform sit to stand with supervision/set-up within 7 day(s). Met 1/21/19  4. Patient will ambulate with contact guard assist for 200 feet with the least restrictive device within 7 day(s). Partially MET    Physical Therapy Goals  Revised 1/21/19  1. Patient will transfer from bed to chair and chair to bed with modified independence using the least restrictive device within 7 day(s). 2.  Patient will ambulate with modified independence for 200 feet with the least restrictive device within 7 day(s). physical Therapy TREATMENT  Patient: Marialuisa Gardiner (51 y.o. male)  Date: 1/22/2019  Diagnosis: Cellulitis Cellulitis  Procedure(s) (LRB):  INCISION AND DRAINAGE LEFT THIGH ABSCESS (Left) 8 Days Post-Op  Precautions:    Chart, physical therapy assessment, plan of care and goals were reviewed. ASSESSMENT:  Patient received supine in bed. Patient is modified independent for bed mobility and transfers. Patient able 180 feet with rolling walker and stand-by assistance. Patient demonstrates forward flexed posture when walking. Patient with overall steady gait and no loss of balance. Patient would benefit from rollator walker at home secondary to limited endurance. Progression toward goals:  [x]    Improving appropriately and progressing toward goals  []    Improving slowly and progressing toward goals  []    Not making progress toward goals and plan of care will be adjusted     PLAN:  Patient continues to benefit from skilled intervention to address the above impairments.   Continue treatment per established plan of care. Discharge Recommendations:  Home Health  Further Equipment Recommendations for Discharge:  Rollator walker     SUBJECTIVE:   Patient stated I'll give it a try.     OBJECTIVE DATA SUMMARY:   Critical Behavior:  Neurologic State: Alert  Orientation Level: Oriented X4  Cognition: Follows commands  Safety/Judgement: Awareness of environment, Fall prevention, Home safety     Functional Mobility Training:  Bed Mobility:  Supine to Sit: Modified independent  Sit to Supine: Modified independent  Scooting: Modified independent     Transfers:  Sit to Stand: Modified independent  Stand to Sit: Modified independent     Balance:  Sitting: Intact  Standing: Impaired; With support  Standing - Static: Good  Standing - Dynamic : Fair     Ambulation/Gait Training:  Distance (ft): 180 Feet (ft)  Assistive Device: Gait belt;Walker, rolling  Ambulation - Level of Assistance: Supervision  Gait Abnormalities: Decreased step clearance  Base of Support: Narrowed  Speed/Renea: Pace decreased (<100 feet/min)  Step Length: Right shortened;Left shortened    Pain:  Pain Scale 1: Numeric (0 - 10)  Pain Intensity 1: 7  Pain Location 1: Neck  Pain Orientation 1: Mid  Pain Description 1: Aching;Tightness  Pain Intervention(s) 1: Medication (see MAR)     Activity Tolerance:   Good  Please refer to the flowsheet for vital signs taken during this treatment.   After treatment:   []    Patient left in no apparent distress sitting up in chair  [x]    Patient left in no apparent distress in bed  [x]    Call bell left within reach  [x]    Nursing notified  []    Caregiver present  []    Bed alarm activated    COMMUNICATION/COLLABORATION:   The patients plan of care was discussed with: Registered Nurse    Gamaliel Aragon, PT   Time Calculation: 18 mins

## 2019-01-23 PROCEDURE — 74011250637 HC RX REV CODE- 250/637: Performed by: NEUROMUSCULOSKELETAL MEDICINE & OMM

## 2019-01-23 PROCEDURE — 96372 THER/PROPH/DIAG INJ SC/IM: CPT

## 2019-01-23 PROCEDURE — 74011250636 HC RX REV CODE- 250/636: Performed by: HOSPITALIST

## 2019-01-23 PROCEDURE — 74011250637 HC RX REV CODE- 250/637: Performed by: INTERNAL MEDICINE

## 2019-01-23 PROCEDURE — 97602 WOUND(S) CARE NON-SELECTIVE: CPT

## 2019-01-23 PROCEDURE — 99218 HC RM OBSERVATION: CPT

## 2019-01-23 PROCEDURE — 74011250637 HC RX REV CODE- 250/637: Performed by: PSYCHIATRY & NEUROLOGY

## 2019-01-23 PROCEDURE — G0378 HOSPITAL OBSERVATION PER HR: HCPCS

## 2019-01-23 RX ORDER — BUPROPION HYDROCHLORIDE 450 MG/1
450 TABLET, FILM COATED, EXTENDED RELEASE ORAL
Qty: 14 TAB | Refills: 1 | Status: SHIPPED | OUTPATIENT
Start: 2019-01-24

## 2019-01-23 RX ORDER — BUSPIRONE HYDROCHLORIDE 5 MG/1
5 TABLET ORAL 2 TIMES DAILY
Qty: 28 TAB | Refills: 1 | Status: SHIPPED | OUTPATIENT
Start: 2019-01-23 | End: 2019-08-12

## 2019-01-23 RX ORDER — DULOXETIN HYDROCHLORIDE 60 MG/1
60 CAPSULE, DELAYED RELEASE ORAL DAILY
Qty: 14 CAP | Refills: 1 | Status: SHIPPED | OUTPATIENT
Start: 2019-01-24 | End: 2022-01-12

## 2019-01-23 RX ORDER — LORAZEPAM 1 MG/1
1 TABLET ORAL
Qty: 21 TAB | Refills: 0 | Status: SHIPPED | OUTPATIENT
Start: 2019-01-23 | End: 2022-09-26 | Stop reason: ALTCHOICE

## 2019-01-23 RX ORDER — PANTOPRAZOLE SODIUM 40 MG/1
40 TABLET, DELAYED RELEASE ORAL
Status: DISCONTINUED | OUTPATIENT
Start: 2019-01-24 | End: 2019-01-24 | Stop reason: HOSPADM

## 2019-01-23 RX ORDER — CALCIUM CARBONATE 200(500)MG
400 TABLET,CHEWABLE ORAL
Status: DISCONTINUED | OUTPATIENT
Start: 2019-01-23 | End: 2019-01-24 | Stop reason: HOSPADM

## 2019-01-23 RX ADMIN — BUPROPION HYDROCHLORIDE 450 MG: 150 TABLET, EXTENDED RELEASE ORAL at 09:12

## 2019-01-23 RX ADMIN — DULOXETINE HYDROCHLORIDE 60 MG: 30 CAPSULE, DELAYED RELEASE ORAL at 09:13

## 2019-01-23 RX ADMIN — BUSPIRONE HYDROCHLORIDE 5 MG: 5 TABLET ORAL at 09:12

## 2019-01-23 RX ADMIN — PREGABALIN 225 MG: 75 CAPSULE ORAL at 09:13

## 2019-01-23 RX ADMIN — ZOLPIDEM TARTRATE 10 MG: 5 TABLET ORAL at 01:21

## 2019-01-23 RX ADMIN — Medication 1 CAPSULE: at 09:13

## 2019-01-23 RX ADMIN — CALCIUM CARBONATE (ANTACID) CHEW TAB 500 MG 400 MG: 500 CHEW TAB at 23:19

## 2019-01-23 RX ADMIN — CALCIUM CARBONATE-VITAMIN D TAB 500 MG-200 UNIT 1 TABLET: 500-200 TAB at 09:12

## 2019-01-23 RX ADMIN — PREGABALIN 225 MG: 75 CAPSULE ORAL at 18:11

## 2019-01-23 RX ADMIN — METHADONE HYDROCHLORIDE 10 MG: 10 TABLET ORAL at 09:13

## 2019-01-23 RX ADMIN — METHADONE HYDROCHLORIDE 10 MG: 10 TABLET ORAL at 20:22

## 2019-01-23 RX ADMIN — CARVEDILOL 25 MG: 12.5 TABLET, FILM COATED ORAL at 09:13

## 2019-01-23 RX ADMIN — PANTOPRAZOLE SODIUM 40 MG: 40 TABLET, DELAYED RELEASE ORAL at 23:18

## 2019-01-23 RX ADMIN — LORAZEPAM 1 MG: 1 TABLET ORAL at 22:54

## 2019-01-23 RX ADMIN — Medication 10 ML: at 13:22

## 2019-01-23 RX ADMIN — Medication 10 ML: at 20:23

## 2019-01-23 RX ADMIN — HYDROCODONE BITARTRATE AND ACETAMINOPHEN 2 TABLET: 5; 325 TABLET ORAL at 01:21

## 2019-01-23 RX ADMIN — ENOXAPARIN SODIUM 40 MG: 40 INJECTION, SOLUTION INTRAVENOUS; SUBCUTANEOUS at 09:13

## 2019-01-23 RX ADMIN — LORAZEPAM 1 MG: 1 TABLET ORAL at 13:22

## 2019-01-23 RX ADMIN — Medication 10 ML: at 05:05

## 2019-01-23 RX ADMIN — HYDROCODONE BITARTRATE AND ACETAMINOPHEN 2 TABLET: 5; 325 TABLET ORAL at 05:05

## 2019-01-23 RX ADMIN — LORAZEPAM 1 MG: 1 TABLET ORAL at 05:14

## 2019-01-23 RX ADMIN — LISINOPRIL 40 MG: 20 TABLET ORAL at 09:12

## 2019-01-23 RX ADMIN — POLYETHYLENE GLYCOL 3350 17 G: 17 POWDER, FOR SOLUTION ORAL at 11:04

## 2019-01-23 RX ADMIN — HYDROCODONE BITARTRATE AND ACETAMINOPHEN 2 TABLET: 5; 325 TABLET ORAL at 11:03

## 2019-01-23 RX ADMIN — TIZANIDINE 2 MG: 4 TABLET ORAL at 09:12

## 2019-01-23 RX ADMIN — BUSPIRONE HYDROCHLORIDE 5 MG: 5 TABLET ORAL at 18:11

## 2019-01-23 RX ADMIN — METHYLPHENIDATE HYDROCHLORIDE 10 MG: 5 TABLET ORAL at 09:12

## 2019-01-23 RX ADMIN — CARVEDILOL 25 MG: 12.5 TABLET, FILM COATED ORAL at 16:15

## 2019-01-23 RX ADMIN — HYDROCODONE BITARTRATE AND ACETAMINOPHEN 2 TABLET: 5; 325 TABLET ORAL at 20:22

## 2019-01-23 RX ADMIN — HYDROCODONE BITARTRATE AND ACETAMINOPHEN 2 TABLET: 5; 325 TABLET ORAL at 16:08

## 2019-01-23 NOTE — PROGRESS NOTES
1951: Bedside shift change report given to Temitope Valle (oncoming nurse) by Russ Muhammad (offgoing nurse). Report included the following information SBAR, Kardex, ED Summary, Intake/Output, MAR and Recent Results. 0725: Bedside shift change report given to Cristiana Whitney (oncoming nurse) by Temitope Valle (offgoing nurse). Report included the following information SBAR, Kardex, ED Summary, Intake/Output, MAR and Recent Results.

## 2019-01-23 NOTE — PROGRESS NOTES
0710hrs . [place] Bedside and Verbal shift change report given to Danielle) by Ad (offgoing nurse). Report included the following information SBAR, Kardex, Intake/Output, MAR, Recent Results and Med Rec Status.      1910hrs . [place] Bedside and Verbal shift change report given to [facility] nurse) by [facility] nurse).  Report included the following information SBAR, Kardex, Intake/Output, MAR, Recent Results and Med Rec Status.

## 2019-01-23 NOTE — BH NOTES
Brief Psychiatric Note:    Will provide pt a 2 week prescription with 1 refill for standing psychiatric medications at patient's request (Cymbalta, Wellbutrin, Buspar, Ativan).

## 2019-01-23 NOTE — PROGRESS NOTES
Discussed in round this am with MD and team.  Met with patient along with bedside nurse and wound care nurse. They delivered the Wound VAC from [facility] on last night. [phone]. Wound assessment today. To pull the vac off and recommendation for dressing changing 3-4 times a week. Will need specific orders for the OPIC from Dr. Rama Alatorre. To call his office his office. Office called along with Wound care Nurse. He will sign forms in the am.     Discussed options again. [address] [place] [address]. Received call from [facility] - received  Auth for transfer late yesterday. He does not want to go to [facility]- He is concerned about his apartment and paying his bills. Offer to talk to the  again. Offer to have letter fax to the companies to verify he was in the hospital and needed rehab. Discussed his Pain Medications again- We had call his provider and they agreed for him to be a walk-in doing their office hours. Patient is aware of the specific times. We offer to arrange transportation to his office at discharge. Discussed his Psych medications- Since he has no establish provider yet this is an issue. He had not seen a provider in over 6 months and no refills on his medications. We made multiple calls to  providers provided from the insurance company and still difficulty getting an appointment. The one provider who said yes the patient does not want to see this provider. Talked to Dr. Dora Collier and he agreed to provide two weeks prescriptions. This is their protocol for their patients on the third floor. Made another call back to the insurance company for additional information. Still no success at this time. She will send some more information. Discussed the Home Health and Medicaid Personal Care. At this time the plans is for Home with Medicaid Personal Care and Wound Care at the [facility]. He knows he will have to go there 3-4 times a week.  M-F at [facility] and [employer] (acquired by [employer] in 2014) at [facility]. Once orders signed will need to call to get specific dates and time. [facility] [place] called patient to deliver the Rollator. He added additional features to the order that will cost patient $ 75.00 out of pockets. Patient states he was told by the insurance co he should be able to get one without any out of pocket cost.   We called Shiva Welsh back and still told the same information. Patient states he was given a book that showed the type of walker he could have. This information is at his home. Still have not heard back from Ms. Mariel Ruth from [employer]. He has both Medicare and Medicaid through the same company that maybe causing some confusion about administration of benefits. Addendum:  3:30PM   Patient asked to see me again. We discussed the above again. We discussed SNF. He agreed to consider this option. Made call to [facility] with a conference call so he could get his questions answered. He agreed to go tomorrow short-term- the initial authorization is for  5 days- then the facility will obtain additional days-. He discussed his concerns about his apartment and bills. We agreed to provide transportation via [employer] from the facility-to his apartment and back when it gets closer to paying his bills. He does not have his keys but he states the  will let him in. They have done this for him In the past.    He said he did not have any clothes. We discussed a plan-to obtain a few items for him to take to the facility for a few days. Donations of some items to be ready in the am.     He is now in agreement for to go to [facility]. Attentive transportation for 11:00AM via [employer]. Fax information to [facility]  regarding Wound Care Orders now. [facility]. In the Am to finalize plans.      [address] MSW RN   [phone]

## 2019-01-23 NOTE — PROGRESS NOTES
0710) Bedside shift change report given to Tonja Olmedo RN (oncoming nurse) by Terrell Blevins RN (offgoing nurse). Report included the following information SBAR, Kardex, MAR, Accordion and Recent Results. 1125) Dr. Marylee Matin (MD), Sánchez Cortés (wound care RN), and [facility] (care manager) in room to assess wound. Pt stage 2 on buttocks healing. Lian April RN recommend every other day wound care on L thigh surgical site- adaptic and foam dressing  1930) Bedside shift change report given to Terrell Blevins RN (oncoming nurse) by Tonja Olmedo RN (offgoing nurse). Report included the following information SBAR, Kardex and MAR. Perico Núñez

## 2019-01-23 NOTE — WOUND CARE
Wound care: revisited the wound today removed the wound VAC wound granulated well no sign of infection edema from the wound edges gone  Wound looks healthy. Fascia is visible with a very small white slough adhere to the fascia. Small amount of drainage noted no odor,wound edges open. Wound clean with NS and applied a small piece of Aquacel ag to the wound over the fascia , applied a small amount of Carrasyn gel to the wound and covered with adaptic and secured with Mepilex border foam. Patient tolerated the procedure well. No complaints from the patient. Recommended wound care dressing change every other day after cleaning the wound with wound cleanser or NS.  Dry the wound gently and apply very small amount of Carrasyn gel to the wound and cover with Mepilex  Border foam .

## 2019-01-23 NOTE — PROGRESS NOTES
Counted patients home medications with Sheryl Lei RN. Count confirmed at   Lorazepam 17  Methadone 11  Norco   31    Counts compared to transfer in report by RAFI Swartz and noted to be correct. Medications placed in security bag and taken to pharmacy by Sheryl Lei RN.

## 2019-01-23 NOTE — PROGRESS NOTES
Hospitalist Progress Note    Subjective:   Daily Progress Note: 2019 10:21 AM    No reports of pain or chills. Tolerating wound vac today. Current Facility-Administered Medications   Medication Dose Route Frequency    lactobac ac& pc-s.therm-b.anim (DRE Q/RISAQUAD)  1 Cap Oral DAILY    methylphenidate HCl (RITALIN) tablet 10 mg  10 mg Oral DAILY    methadone (DOLOPHINE) tablet 10 mg  10 mg Oral BID    haloperidol lactate (HALDOL) injection 5 mg  5 mg IntraMUSCular Q4H PRN    busPIRone (BUSPAR) tablet 5 mg  5 mg Oral BID    sodium chloride (NS) flush 5-40 mL  5-40 mL IntraVENous Q8H    sodium chloride (NS) flush 5-40 mL  5-40 mL IntraVENous PRN    enoxaparin (LOVENOX) injection 40 mg  40 mg SubCUTAneous Q24H    acetaminophen (TYLENOL) tablet 650 mg  650 mg Oral Q6H PRN    ondansetron (ZOFRAN) injection 4 mg  4 mg IntraVENous Q6H PRN    pregabalin (LYRICA) capsule 225 mg  225 mg Oral BID    LORazepam (ATIVAN) tablet 1 mg  1 mg Oral Q8H PRN    HYDROcodone-acetaminophen (NORCO) 5-325 mg per tablet 2 Tab  2 Tab Oral Q4H PRN    carvedilol (COREG) tablet 25 mg  25 mg Oral BID WITH MEALS    DULoxetine (CYMBALTA) capsule 60 mg  60 mg Oral DAILY    polyethylene glycol (MIRALAX) packet 17 g  17 g Oral QID PRN    tiZANidine (ZANAFLEX) tablet 2 mg  2 mg Oral DAILY PRN    buPROPion XL (WELLBUTRIN XL) tablet 450 mg  450 mg Oral 7am    calcium-vitamin D (OS-KRISTINE) 500 mg-200 unit tablet  1 Tab Oral DAILY    lisinopril (PRINIVIL, ZESTRIL) tablet 40 mg  40 mg Oral DAILY    zolpidem (AMBIEN) tablet 10 mg  10 mg Oral QHS PRN        Review of Systems  A comprehensive review of systems was negative except for that written in the HPI.     Objective:     Visit Vitals  /70 (BP 1 Location: Left arm, BP Patient Position: At rest)   Pulse 97   Temp 98.1 °F (36.7 °C)   Resp 16   Ht 6' 7\" (2.007 m)   Wt 99.8 kg (220 lb)   SpO2 100%   BMI 24.78 kg/m²      O2 Device: Room air    Temp (24hrs), Av.4 °F (36.9 °C), Min:97.4 °F (36.3 °C), Max:99.4 °F (37.4 °C)      No intake/output data recorded. 01/21 1901 - 01/23 0700  In: 5 [P.O.:840]  Out: 1 [Urine:950; Drains:20]    Visit Vitals  /70 (BP 1 Location: Left arm, BP Patient Position: At rest)   Pulse 97   Temp 98.1 °F (36.7 °C)   Resp 16   Ht 6' 7\" (2.007 m)   Wt 99.8 kg (220 lb)   SpO2 100%   BMI 24.78 kg/m²     General:  Alert, cooperative, no distress, appears stated age. Head:  Normocephalic, without obvious abnormality, atraumatic. Eyes:  Conjunctivae/corneas clear. PERRL, EOMs intact. Fundi benign   Ears:  Normal TMs and external ear canals both ears. Nose: Nares normal. Septum midline. Mucosa normal. No drainage or sinus tenderness. Throat: Lips, mucosa, and tongue normal. Teeth and gums normal.   Neck: Supple, symmetrical, trachea midline, no adenopathy, thyroid: no enlargement/tenderness/nodules, no carotid bruit and no JVD. Back:   Symmetric, no curvature. ROM normal. No CVA tenderness. Lungs:   Clear to auscultation bilaterally. Chest wall:  No tenderness or deformity. Heart:  Regular rate and rhythm, S1, S2 normal, no murmur, click, rub or gallop. Abdomen:   Soft, non-tender. Bowel sounds normal. No masses,  No organomegaly. Genitalia:  Normal male without lesion, discharge or tenderness. Rectal:  Normal tone, normal prostate, no masses or tenderness  Guaiac negative stool. Extremities: Extremities normal, atraumatic, no cyanosis or edema. Pulses: 2+ and symmetric all extremities. Skin: Skin color, texture, turgor normal. No rashes or lesions   Lymph nodes: Cervical, supraclavicular, and axillary nodes normal.   Neurologic: CNII-XII intact. Normal strength, sensation and reflexes throughout. Additional comments:None    Data Review    No results found for this or any previous visit (from the past 24 hour(s)).       Assessment/Plan:     Principal Problem:    Cellulitis (1/13/2019)    Active Problems:    Abscess (1/14/2019)    left thigh abscess -finished 7 days of anne-marie.  pt to go home with fu with walk in pain clinic.     djd back and unstable gait - ptot        Hx of asthma/anemia/htn - cont coreg, prinivil.  dimitris prn. Care Plan discussed with: Patient/Family    Total time spent with patient: 20 minutes.     Signed By: Maxine Lamb DO     January 23, 2019

## 2019-01-24 VITALS
HEIGHT: 78 IN | OXYGEN SATURATION: 100 % | DIASTOLIC BLOOD PRESSURE: 71 MMHG | SYSTOLIC BLOOD PRESSURE: 144 MMHG | BODY MASS INDEX: 25.45 KG/M2 | HEART RATE: 96 BPM | RESPIRATION RATE: 18 BRPM | WEIGHT: 220 LBS | TEMPERATURE: 98.4 F

## 2019-01-24 LAB
ALBUMIN SERPL-MCNC: 2.8 G/DL (ref 3.5–5)
ALBUMIN/GLOB SERPL: 0.8 {RATIO} (ref 1.1–2.2)
ALP SERPL-CCNC: 93 U/L (ref 45–117)
ALT SERPL-CCNC: 66 U/L (ref 12–78)
ANION GAP SERPL CALC-SCNC: 6 MMOL/L (ref 5–15)
AST SERPL-CCNC: 35 U/L (ref 15–37)
BILIRUB SERPL-MCNC: 0.5 MG/DL (ref 0.2–1)
BUN SERPL-MCNC: 15 MG/DL (ref 6–20)
BUN/CREAT SERPL: 16 (ref 12–20)
CALCIUM SERPL-MCNC: 9.4 MG/DL (ref 8.5–10.1)
CHLORIDE SERPL-SCNC: 104 MMOL/L (ref 97–108)
CK SERPL-CCNC: 34 U/L (ref 39–308)
CO2 SERPL-SCNC: 29 MMOL/L (ref 21–32)
CREAT SERPL-MCNC: 0.95 MG/DL (ref 0.7–1.3)
ERYTHROCYTE [DISTWIDTH] IN BLOOD BY AUTOMATED COUNT: 14.3 % (ref 11.5–14.5)
GLOBULIN SER CALC-MCNC: 3.5 G/DL (ref 2–4)
GLUCOSE SERPL-MCNC: 108 MG/DL (ref 65–100)
HCT VFR BLD AUTO: 38.9 % (ref 36.6–50.3)
HGB BLD-MCNC: 12.6 G/DL (ref 12.1–17)
MCH RBC QN AUTO: 30.1 PG (ref 26–34)
MCHC RBC AUTO-ENTMCNC: 32.4 G/DL (ref 30–36.5)
MCV RBC AUTO: 93.1 FL (ref 80–99)
NRBC # BLD: 0 K/UL (ref 0–0.01)
NRBC BLD-RTO: 0 PER 100 WBC
PLATELET # BLD AUTO: 620 K/UL (ref 150–400)
PMV BLD AUTO: 9.8 FL (ref 8.9–12.9)
POTASSIUM SERPL-SCNC: 4.3 MMOL/L (ref 3.5–5.1)
PROT SERPL-MCNC: 6.3 G/DL (ref 6.4–8.2)
RBC # BLD AUTO: 4.18 M/UL (ref 4.1–5.7)
SODIUM SERPL-SCNC: 139 MMOL/L (ref 136–145)
WBC # BLD AUTO: 10.5 K/UL (ref 4.1–11.1)

## 2019-01-24 PROCEDURE — 74011250637 HC RX REV CODE- 250/637: Performed by: INTERNAL MEDICINE

## 2019-01-24 PROCEDURE — 74011250637 HC RX REV CODE- 250/637: Performed by: PSYCHIATRY & NEUROLOGY

## 2019-01-24 PROCEDURE — 99218 HC RM OBSERVATION: CPT

## 2019-01-24 PROCEDURE — 80053 COMPREHEN METABOLIC PANEL: CPT

## 2019-01-24 PROCEDURE — 85027 COMPLETE CBC AUTOMATED: CPT

## 2019-01-24 PROCEDURE — 74011250636 HC RX REV CODE- 250/636: Performed by: HOSPITALIST

## 2019-01-24 PROCEDURE — 96372 THER/PROPH/DIAG INJ SC/IM: CPT

## 2019-01-24 PROCEDURE — G0378 HOSPITAL OBSERVATION PER HR: HCPCS

## 2019-01-24 PROCEDURE — 82550 ASSAY OF CK (CPK): CPT

## 2019-01-24 PROCEDURE — 74011250637 HC RX REV CODE- 250/637: Performed by: NEUROMUSCULOSKELETAL MEDICINE & OMM

## 2019-01-24 PROCEDURE — 36415 COLL VENOUS BLD VENIPUNCTURE: CPT

## 2019-01-24 RX ORDER — DICLOFENAC SODIUM 10 MG/G
2 GEL TOPICAL
Qty: 30 G | Refills: 0 | Status: SHIPPED | OUTPATIENT
Start: 2019-01-24 | End: 2019-02-08 | Stop reason: ALTCHOICE

## 2019-01-24 RX ORDER — LISINOPRIL 40 MG/1
40 TABLET ORAL DAILY
Qty: 30 TAB | Refills: 0 | Status: SHIPPED | OUTPATIENT
Start: 2019-01-24 | End: 2019-08-12

## 2019-01-24 RX ORDER — POLYETHYLENE GLYCOL 3350 17 G/17G
17 POWDER, FOR SOLUTION ORAL
Qty: 30 PACKET | Refills: 0 | Status: SHIPPED | OUTPATIENT
Start: 2019-01-24 | End: 2019-10-09

## 2019-01-24 RX ORDER — HYDROCODONE BITARTRATE AND ACETAMINOPHEN 5; 325 MG/1; MG/1
1 TABLET ORAL
Qty: 20 TAB | Refills: 0 | Status: SHIPPED | OUTPATIENT
Start: 2019-01-24

## 2019-01-24 RX ORDER — FERROUS SULFATE, DRIED 160(50) MG
1 TABLET, EXTENDED RELEASE ORAL DAILY
Qty: 20 TAB | Refills: 0 | Status: SHIPPED | OUTPATIENT
Start: 2019-01-24 | End: 2020-12-30

## 2019-01-24 RX ORDER — NALOXONE HYDROCHLORIDE 2 MG/.4ML
2 INJECTION, SOLUTION INTRAMUSCULAR; SUBCUTANEOUS
Qty: 3 DEVICE | Refills: 0 | Status: SHIPPED | OUTPATIENT
Start: 2019-01-24 | End: 2019-01-24

## 2019-01-24 RX ORDER — PANTOPRAZOLE SODIUM 40 MG/1
40 TABLET, DELAYED RELEASE ORAL
Qty: 20 TAB | Refills: 0 | Status: SHIPPED | OUTPATIENT
Start: 2019-01-25 | End: 2019-08-12

## 2019-01-24 RX ORDER — CARVEDILOL 25 MG/1
25 TABLET ORAL 2 TIMES DAILY WITH MEALS
Qty: 20 TAB | Refills: 0 | Status: SHIPPED | OUTPATIENT
Start: 2019-01-24 | End: 2019-07-25 | Stop reason: SDUPTHER

## 2019-01-24 RX ORDER — TRAZODONE HYDROCHLORIDE 50 MG/1
50 TABLET ORAL
Qty: 20 TAB | Refills: 0 | Status: SHIPPED | OUTPATIENT
Start: 2019-01-24 | End: 2019-08-12

## 2019-01-24 RX ORDER — METHADONE HYDROCHLORIDE 10 MG/1
10 TABLET ORAL 2 TIMES DAILY
Qty: 20 TAB | Refills: 0 | Status: SHIPPED | OUTPATIENT
Start: 2019-01-24

## 2019-01-24 RX ORDER — TIZANIDINE 4 MG/1
4 TABLET ORAL
Qty: 20 TAB | Refills: 0 | Status: SHIPPED | OUTPATIENT
Start: 2019-01-24 | End: 2019-04-24 | Stop reason: SDUPTHER

## 2019-01-24 RX ORDER — TESTOSTERONE CYPIONATE 200 MG/ML
INJECTION INTRAMUSCULAR
Qty: 2 ML | Refills: 5 | Status: SHIPPED | OUTPATIENT
Start: 2019-01-24 | End: 2019-08-12 | Stop reason: SDUPTHER

## 2019-01-24 RX ORDER — PREGABALIN 225 MG/1
225 CAPSULE ORAL 2 TIMES DAILY
Qty: 20 CAP | Refills: 0 | Status: SHIPPED | OUTPATIENT
Start: 2019-01-24

## 2019-01-24 RX ADMIN — HYDROCODONE BITARTRATE AND ACETAMINOPHEN 2 TABLET: 5; 325 TABLET ORAL at 00:52

## 2019-01-24 RX ADMIN — PANTOPRAZOLE SODIUM 40 MG: 40 TABLET, DELAYED RELEASE ORAL at 07:36

## 2019-01-24 RX ADMIN — ENOXAPARIN SODIUM 40 MG: 40 INJECTION, SOLUTION INTRAVENOUS; SUBCUTANEOUS at 09:30

## 2019-01-24 RX ADMIN — POLYETHYLENE GLYCOL 3350 17 G: 17 POWDER, FOR SOLUTION ORAL at 10:25

## 2019-01-24 RX ADMIN — DULOXETINE HYDROCHLORIDE 60 MG: 30 CAPSULE, DELAYED RELEASE ORAL at 09:29

## 2019-01-24 RX ADMIN — LORAZEPAM 1 MG: 1 TABLET ORAL at 07:36

## 2019-01-24 RX ADMIN — Medication 10 ML: at 05:49

## 2019-01-24 RX ADMIN — CALCIUM CARBONATE-VITAMIN D TAB 500 MG-200 UNIT 1 TABLET: 500-200 TAB at 09:29

## 2019-01-24 RX ADMIN — HYDROCODONE BITARTRATE AND ACETAMINOPHEN 2 TABLET: 5; 325 TABLET ORAL at 05:49

## 2019-01-24 RX ADMIN — METHYLPHENIDATE HYDROCHLORIDE 10 MG: 5 TABLET ORAL at 09:29

## 2019-01-24 RX ADMIN — LISINOPRIL 40 MG: 20 TABLET ORAL at 09:30

## 2019-01-24 RX ADMIN — BUPROPION HYDROCHLORIDE 450 MG: 150 TABLET, EXTENDED RELEASE ORAL at 09:30

## 2019-01-24 RX ADMIN — BUSPIRONE HYDROCHLORIDE 5 MG: 5 TABLET ORAL at 09:30

## 2019-01-24 RX ADMIN — CARVEDILOL 25 MG: 12.5 TABLET, FILM COATED ORAL at 09:29

## 2019-01-24 RX ADMIN — METHADONE HYDROCHLORIDE 10 MG: 10 TABLET ORAL at 09:29

## 2019-01-24 RX ADMIN — HYDROCODONE BITARTRATE AND ACETAMINOPHEN 2 TABLET: 5; 325 TABLET ORAL at 11:03

## 2019-01-24 RX ADMIN — PREGABALIN 225 MG: 75 CAPSULE ORAL at 09:29

## 2019-01-24 RX ADMIN — ZOLPIDEM TARTRATE 10 MG: 5 TABLET ORAL at 00:52

## 2019-01-24 RX ADMIN — TIZANIDINE 2 MG: 4 TABLET ORAL at 07:36

## 2019-01-24 NOTE — INTERDISCIPLINARY ROUNDS
IDR with Sinda Gaucher (MD), Senthil Andrews (pharmacist), Akin Wheeler (), Anh Shah (nurse manager), Anupama Hodge (nursing supervisor),  America Guerra (infection control), Ross Post (diabetes educator), and Christian Vidal (RN) to discuss plan of care including wound care assessment, possible discharge tomorrow before pain clinic closes, wound care with [facility].

## 2019-01-24 NOTE — WOUND CARE
Wound care revisited the wound today:  Changed the dressing today wound looks clean with good granulation tissue. Recommendation:  As patient is going to SNF we can change the alternate dressing in to Daily .

## 2019-01-24 NOTE — PROGRESS NOTES
Discussed in rounds this am with MD and Team.   Talked to [facility]- received additional information on wound care orders   Called again to obtain psych outpatient follow-up. Was able to get an appointment with [facility] Feb 24, 2010. Requested fax of patient medical history O [phone] Fax [phone] Feb 27 @ 1:30PM.    Talked to Dr. Sandra Fernandez and obtain orders for [facility] for wound care if patient change his mind about SNF. Those orders sent with patient to the facility. He wants to see patient in his office next Thursday. Psych Sent prescriptions for Medications for two weeks. - sent to facility- he will need to be provided with prescriptions from the nursing facility at discharge there - until his follow-up at the new provider. Met with patient in his room. Patient still in agreement to transfer to SNF, [facility].. Reviewed information for follow-up. 1. At discharge he will need to go to the pain specialist -as walk in for his pain medications refills. 2. Psych Medications- prescriptions to cover two weeks - needs to keep appointment for Feb 27th. 3. Rollator ( with 4 wheels, hand brakes and basket) he wants to have this order now through [facility]owalski or [address]. He said the insurance co told him he should be able to get this with an MD order without a copayment. When call providers got different  4. Medicaid Personal Care needs to be set up prior to discharge. [facility]AI sent to the facility  5. Home Health if meets criteria or outpatient [facility] for wound care   5. Transportation to appointments      Follow-up Information      Follow up With Specialties Details Why Contact Info     Laina Salazar DDS Dental General Practice Schedule an appointment as soon as possible for a visit This is the dentist. please call and schedule your appointment. this dentist is in network.  [address]      Dawn Harrison MD General Surgery, Breast Surgery, Oncology On 1/31/2019 [facility]  # [phone] your appoinment time 1:00pm. please arrive 15 min early for your appointment. [address]    [address] [facility] Internal Medicine Internal Medicine On 2/8/2019 Your appointment time is 3:30. Nurse practitioner [address]   Dipika Galicia MD Internal Medicine  You will need to Walk in to his office during office hours and get your Pain Medication regemin. [id number] [facility] 101[address] [facility] DME Services  Please reorder Rollator  ( with cushion, break & basket if cover by insurance), at discharge from the [address] Janett Ganser) [place]  Skilled Care, Nursing care, Wound care, Pain managment, Therapy. Case Management- to arrange Home Health. Medicaid [facility], Equipment [facility] Shower Bench [address] [place] [address]   [phone]      Wound Care Ordeers     Clean the wound in Left Thigh with NS or Wound Cleanser. Dry gently with 4x4 apply  Small amount of Carrasyn gel to the wound, place non adherent adaptic and cover the wound with Mepilex border foam.     [facility]   Your Psychiatrist  new appointment Feb 27, 2018 @ 1:30PM. Please bring ID, Insurance Card and list of [address] [address] [facility]     Graciela Shahid MD Internal Medicine On 2/1/2019 your new PCP appointment  2-1 @ 2:30PM. please arrive 20 min early bring your ID, Insurance Card and list of Medications.  Call to [address]   [phone]      [facility]  Post rehab please arrange Wound Care At the [facility] if unable to set up Home Health. Dr. Maria Dolores Kc will follow-  [address] [facility]     Medicaid Personal Care     please set up at discharge from [address] had accepted or choose an agency who can accept at discharge.         Your Scheduled Appointments    Thursday January 31, 2019  1:00 PM EST  POST OP with MD Windy Kaba [facility] ([address]) [address] [place], [place] [address]vd. [facility], [address] [place] [address]  [phone]   Friday February 01, 2019  2:30 PM EST  New Patient with Umberto Brunner, MD  [address] [facility] ([address]) [place]  [address]   Friday February 08, 2019 11:50 AM EST  Follow Up with Marimar Stokes MD  [facility] Diabetes and Endocr[facility] [facility] 6019 [facility] [id number] [phone]           Transportation through [facility] [employer] to the SNF. Discharge Instruction fax to the facility. Nursing to call report. [phone]. Information sent with patient. Written Prescriptions. H&P. Discharge Summary. Therapy notes, Radiology, UAI, Current MAR. Talked to [employer]- made referral for [employer] and Coordination of services. She will contact patient. Care Management Interventions  PCP Verified by CM: Yes(New PCP on AVS)  Mode of Transport at Discharge:  Other (see comment)(Medicaid Transport)  Transition of Care Consult (CM Consult): Home Health, SNF, Discharge Planning, Other  [address] No  Reason Outside [place]: Unable to staff case  Discharge Durable Medical Equipment: Yes(rollator )  Physical Therapy Consult: Yes  Occupational Therapy Consult: Yes  Current Support Network: Lives Alone  Confirm Follow Up Transport: Other (see comment)(Medicaid)  Plan discussed with Pt/Family/Caregiver: Yes  Freedom of Choice Offered: Yes  Discharge Location  Discharge Placement: Skilled nursing facility      [employer] to be called back to  home [address] MSKISHORE RN   [phone]

## 2019-01-24 NOTE — PROGRESS NOTES
Problem: Pressure Injury - Risk of  Goal: *Prevention of pressure injury  Document Romel Scale and appropriate interventions in the flowsheet. Outcome: Progressing Towards Goal  Pressure Injury Interventions:  Sensory Interventions: Assess need for specialty bed, Monitor skin under medical devices, Minimize linen layers    Moisture Interventions: Maintain skin hydration (lotion/cream), Minimize layers, Moisture barrier, Apply protective barrier, creams and emollients    Activity Interventions: Increase time out of bed    Mobility Interventions: Pressure redistribution bed/mattress (bed type)    Nutrition Interventions: Offer support with meals,snacks and hydration    Friction and Shear Interventions: HOB 30 degrees or less               Problem: Falls - Risk of  Goal: *Absence of Falls  Document Luther Fall Risk and appropriate interventions in the flowsheet.   Outcome: Progressing Towards Goal  Fall Risk Interventions:  Mobility Interventions: Patient to call before getting OOB    Mentation Interventions: Adequate sleep, hydration, pain control, More frequent rounding, Update white board, Toileting rounds, Evaluate medications/consider consulting pharmacy, Door open when patient unattended    Medication Interventions: Teach patient to arise slowly    Elimination Interventions: Patient to call for help with toileting needs    History of Falls Interventions: Evaluate medications/consider consulting pharmacy

## 2019-01-24 NOTE — PROGRESS NOTES
Problem: Pressure Injury - Risk of  Goal: *Prevention of pressure injury  Document Romel Scale and appropriate interventions in the flowsheet. Outcome: Progressing Towards Goal  Pressure Injury Interventions:  Sensory Interventions: Avoid rigorous massage over bony prominences, Assess changes in LOC, Keep linens dry and wrinkle-free, Maintain/enhance activity level, Minimize linen layers, Monitor skin under medical devices, Pressure redistribution bed/mattress (bed type), Turn and reposition approx. every two hours (pillows and wedges if needed)    Moisture Interventions: Absorbent underpads, Apply protective barrier, creams and emollients, Maintain skin hydration (lotion/cream), Minimize layers, Moisture barrier    Activity Interventions: Increase time out of bed, Pressure redistribution bed/mattress(bed type)    Mobility Interventions: Pressure redistribution bed/mattress (bed type)    Nutrition Interventions: Offer support with meals,snacks and hydration    Friction and Shear Interventions: Minimize layers               Problem: Falls - Risk of  Goal: *Absence of Falls  Document Luther Fall Risk and appropriate interventions in the flowsheet.   Outcome: Progressing Towards Goal  Fall Risk Interventions:  Mobility Interventions: Communicate number of staff needed for ambulation/transfer, Utilize walker, cane, or other assistive device    Mentation Interventions: Adequate sleep, hydration, pain control, Door open when patient unattended, Evaluate medications/consider consulting pharmacy, More frequent rounding    Medication Interventions: Evaluate medications/consider consulting pharmacy, Teach patient to arise slowly, Patient to call before getting OOB    Elimination Interventions: Call light in reach, Urinal in reach, Patient to call for help with toileting needs    History of Falls Interventions: Door open when patient unattended, Evaluate medications/consider consulting pharmacy, Investigate reason for fall

## 2019-01-24 NOTE — DISCHARGE INSTRUCTIONS
Cellulitis: Care Instructions  Your Care Instructions    Cellulitis is a skin infection caused by bacteria, most often strep or staph. It often occurs after a break in the skin from a scrape, cut, bite, or puncture, or after a rash. Cellulitis may be treated without doing tests to find out what caused it. But your doctor may do tests, if needed, to look for a specific bacteria, like methicillin-resistant Staphylococcus aureus (MRSA). The doctor has checked you carefully, but problems can develop later. If you notice any problems or new symptoms, get medical treatment right away. Follow-up care is a key part of your treatment and safety. Be sure to make and go to all appointments, and call your doctor if you are having problems. It's also a good idea to know your test results and keep a list of the medicines you take. How can you care for yourself at home? · Take your antibiotics as directed. Do not stop taking them just because you feel better. You need to take the full course of antibiotics. · Prop up the infected area on pillows to reduce pain and swelling. Try to keep the area above the level of your heart as often as you can. · If your doctor told you how to care for your wound, follow your doctor's instructions. If you did not get instructions, follow this general advice:  ? Wash the wound with clean water 2 times a day. Don't use hydrogen peroxide or alcohol, which can slow healing. ? You may cover the wound with a thin layer of petroleum jelly, such as Vaseline, and a nonstick bandage. ? Apply more petroleum jelly and replace the bandage as needed. · Be safe with medicines. Take pain medicines exactly as directed. ? If the doctor gave you a prescription medicine for pain, take it as prescribed. ? If you are not taking a prescription pain medicine, ask your doctor if you can take an over-the-counter medicine.   To prevent cellulitis in the future  · Try to prevent cuts, scrapes, or other injuries to your skin. Cellulitis most often occurs where there is a break in the skin. · If you get a scrape, cut, mild burn, or bite, wash the wound with clean water as soon as you can to help avoid infection. Don't use hydrogen peroxide or alcohol, which can slow healing. · If you have swelling in your legs (edema), support stockings and good skin care may help prevent leg sores and cellulitis. · Take care of your feet, especially if you have diabetes or other conditions that increase the risk of infection. Wear shoes and socks. Do not go barefoot. If you have athlete's foot or other skin problems on your feet, talk to your doctor about how to treat them. When should you call for help? Call your doctor now or seek immediate medical care if:    · You have signs that your infection is getting worse, such as:  ? Increased pain, swelling, warmth, or redness. ? Red streaks leading from the area. ? Pus draining from the area. ? A fever.     · You get a rash.    Watch closely for changes in your health, and be sure to contact your doctor if:    · You do not get better as expected. Where can you learn more? Go to http://naomie-silvino.info/. Aryan Alicia in the search box to learn more about \"Cellulitis: Care Instructions. \"  Current as of: April 17, 2018  Content Version: 11.9  © 4665-4665 [employer]. Care instructions adapted under license by [employer] (which disclaims liability or warranty for this information). If you have questions about a medical condition or this instruction, always ask your healthcare professional. [place] [address] any warranty or liability for your use of this information. Patient Education        Skin Abscess: Care Instructions  Your Care Instructions    A skin abscess is a bacterial infection that forms a pocket of pus. A boil is a kind of skin abscess.  The doctor may have cut an opening in the abscess so that the pus can drain out. You may have gauze in the cut so that the abscess will stay open and keep draining. You may need antibiotics. You will need to follow up with your doctor to make sure the infection has gone away. The doctor has checked you carefully, but problems can develop later. If you notice any problems or new symptoms, get medical treatment right away. Follow-up care is a key part of your treatment and safety. Be sure to make and go to all appointments, and call your doctor if you are having problems. It's also a good idea to know your test results and keep a list of the medicines you take. How can you care for yourself at home? · Apply warm and dry compresses, a heating pad set on low, or a hot water bottle 3 or 4 times a day for pain. Keep a cloth between the heat source and your skin. · If your doctor prescribed antibiotics, take them as directed. Do not stop taking them just because you feel better. You need to take the full course of antibiotics. · Take pain medicines exactly as directed. ? If the doctor gave you a prescription medicine for pain, take it as prescribed. ? If you are not taking a prescription pain medicine, ask your doctor if you can take an over-the-counter medicine. · Keep your bandage clean and dry. Change the bandage whenever it gets wet or dirty, or at least one time a day. · If the abscess was packed with gauze:  ? Keep follow-up appointments to have the gauze changed or removed. If the doctor instructed you to remove the gauze, follow the instructions you were given for how to remove it. ? After the gauze is removed, soak the area in warm water for 15 to 20 minutes 2 times a day, until the wound closes. When should you call for help? Call your doctor now or seek immediate medical care if:    · You have signs of worsening infection, such as:  ? Increased pain, swelling, warmth, or redness. ? Red streaks leading from the infected skin. ? Pus draining from the wound. ?  A fever.    Watch closely for changes in your health, and be sure to contact your doctor if:    · You do not get better as expected. Where can you learn more? Go to http://naomie-silvino.info/. Enter D515 in the search box to learn more about \"Skin Abscess: Care Instructions. \"  Current as of: April 17, 2018  Content Version: 11.9  © 2211-2193 [employer]. Care instructions adapted under license by [employer] (which disclaims liability or warranty for this information). If you have questions about a medical condition or this instruction, always ask your healthcare professional. [place] [address] any warranty or liability for your use of this information.

## 2019-01-24 NOTE — PROGRESS NOTES
1935: Bedside shift change report given to Zita Lamb (oncoming nurse) by Connie Beard (offgoing nurse). Report included the following information SBAR, Kardex, ED Summary, Intake/Output, MAR and Recent Results. 0730: Bedside shift change report given to Michael Heard (oncoming nurse) by Zita Lamb (offgoing nurse). Report included the following information SBAR, Kardex, ED Summary, Intake/Output, MAR and Recent Results.

## 2019-01-24 NOTE — PROGRESS NOTES
0710hrs . Ralph Nano Bedside and Verbal shift change report given to Michael Heard (oncoming nurse) by Rae Bowen (offgoing nurse). Report included the following information SBAR, Kardex, Intake/Output, MAR, Recent Results, Med Rec Status and Cardiac Rhythm NSR.     1239hrs . Ralph Nano Transfer report via telephone was given to Enio Isabel (oncoming nurse) by Michael Heard (offgoing nurse). Report included the following information SBAR, Kardex, Intake/Output, MAR, Recent Results and Med Rec Status. 1300hrs Discharge instruction was discussed with patient, opportunity for questions and clarifications were provided. Medication education was also provided. IV access was removed. Patient was upset that his pain medication was decreased from 10mg to 5mg, which he stated, \"I used to take 10mg and not 5\". Dr. Lei Majano was in the room with the patient, explaining to him why he could not increase his narcotics as he is also taking methadone. Patient was adamant and would insist that it needs to be prescribed the same exact dose from previous. MD explained that when his pain doctor outpatient sees him, he will have to discuss it with him. Wound dressing supplies was sent with patient, his home medication of lorazepam, norco, etc were returned to him from the inpatient pharmacy. 1350hrs Discharged patient to [address] [place] [address] via cab. Complete papers of patient info, medication list, face sheet, etc were sent with him.

## 2019-01-24 NOTE — DISCHARGE SUMMARY
Physician Discharge Summary       Patient: Tay Betancourt MRN: [id number]  SSN: [id number]    YOB: 1950  Age: 77 y.o. Sex: male    PCP: Annika Mckeon MD    Allergies: Amitriptyline; Neurontin [gabapentin]; and Percocet [oxycodone-acetaminophen]    Admit date: 1/12/2019  Admitting Provider: Gretchen Reyes MD    Discharge date: 1/24/2019  Discharging Provider: Analy Florence DO    * Admission Diagnoses: Cellulitis    * Discharge Diagnoses:    Hospital Problems as of 1/24/2019 Date Reviewed: 1/24/2019          Codes Class Noted - Resolved POA    Abscess ICD-10-CM: L02.91  ICD-9-CM: 682.9  1/14/2019 - Present Unknown        * (Principal) Cellulitis ICD-10-CM: L03.90  ICD-9-CM: 682.9  1/13/2019 - Present Unknown              * Hospital Course: 77year old male with history of falls from spinal stenosis (follows up with pain specialist and is on methadone). Pt presented to the hospital with left thigh abrasion which turned into an abscess. The pt underwent an I and d and was later placed on wound vace with resolution of surrounding cellulitits. The pt no longer requires wound vac, pt is getting aquacel ag to the wound over the fascia, applied small amount of carrasyn gel to the wound and covered with adaptic and secured with mepilex border foam.      * Procedures: id of left thigh abscess  Procedure(s):  INCISION AND DRAINAGE LEFT THIGH ABSCESS      Consults: General Surgery    Significant Diagnostic Studies: labs: culture of wound - light strep. Discharge Exam:  Visit Vitals  /77 (BP 1 Location: Left arm, BP Patient Position: At rest)   Pulse 89   Temp 99 °F (37.2 °C)   Resp 16   Ht 6' 7\" (2.007 m)   Wt 99.8 kg (220 lb)   SpO2 98%   BMI 24.78 kg/m²     General:  Alert, cooperative, no distress, appears stated age. Head:  Normocephalic, without obvious abnormality, atraumatic. Eyes:  Conjunctivae/corneas clear. PERRL, EOMs intact.  Fundi benign   Ears:  Normal TMs and external ear canals both ears. Nose: Nares normal. Septum midline. Mucosa normal. No drainage or sinus tenderness. Throat: Lips, mucosa, and tongue normal. Teeth and gums normal.   Neck: Supple, symmetrical, trachea midline, no adenopathy, thyroid: no enlargement/tenderness/nodules, no carotid bruit and no JVD. Back:   Symmetric, no curvature. ROM normal. No CVA tenderness. Lungs:   Clear to auscultation bilaterally. Chest wall:  No tenderness or deformity. Heart:  Regular rate and rhythm, S1, S2 normal, no murmur, click, rub or gallop. Abdomen:   Soft, non-tender. Bowel sounds normal. No masses,  No organomegaly. Genitalia:  Normal male without lesion, discharge or tenderness. Rectal:  Normal tone, normal prostate, no masses or tenderness  Guaiac negative stool. Extremities: Extremities normal, atraumatic, no cyanosis or edema. Pulses: 2+ and symmetric all extremities. Skin: Skin color, texture, turgor normal. No rashes or lesions   Lymph nodes: Cervical, supraclavicular, and axillary nodes normal.   Neurologic: CNII-XII intact. Normal strength, sensation and reflexes throughout. * Discharge Condition: improved  * Disposition: [facility])    Discharge Medications:  Current Discharge Medication List      START taking these medications    Details   polyethylene glycol (MIRALAX) 17 gram packet Take 1 Packet by mouth four (4) times daily as needed. Qty: 30 Packet, Refills: 0      pantoprazole (PROTONIX) 40 mg tablet Take 1 Tab by mouth Daily (before breakfast). Qty: 20 Tab, Refills: 0      calcium-vitamin D (OYSTER SHELL) 500 mg(1,250mg) -200 unit per tablet Take 1 Tab by mouth daily. Qty: 20 Tab, Refills: 0      !! carvedilol (COREG) 25 mg tablet Take 1 Tab by mouth two (2) times daily (with meals). Qty: 20 Tab, Refills: 0      !! naloxone (EVZIO) 2 mg/0.4 mL auto-injector 0.4 mL by IntraMUSCular route once as needed for Overdose for up to 1 dose.   Qty: 3 Device, Refills: 0 !! naloxone (EVZIO) 2 mg/0.4 mL auto-injector 0.4 mL by IntraMUSCular route once as needed for Overdose for up to 1 dose. Qty: 3 Device, Refills: 0      busPIRone (BUSPAR) 5 mg tablet Take 1 Tab by mouth two (2) times a day. Qty: 28 Tab, Refills: 1       !! - Potential duplicate medications found. Please discuss with provider. CONTINUE these medications which have CHANGED    Details   !! lisinopril (PRINIVIL, ZESTRIL) 40 mg tablet Take 1 Tab by mouth daily. Qty: 30 Tab, Refills: 0      !! methadone (DOLOPHINE) 10 mg tablet Take 1 Tab by mouth two (2) times a day. Max Daily Amount: 20 mg.  Qty: 20 Tab, Refills: 0    Associated Diagnoses: Chronic pain syndrome      !! tiZANidine (ZANAFLEX) 4 mg tablet Take 1 Tab by mouth two (2) times daily as needed. Qty: 20 Tab, Refills: 0      traZODone (DESYREL) 50 mg tablet Take 1 Tab by mouth nightly as needed for Sleep. Qty: 20 Tab, Refills: 0      VOLTAREN 1 % gel Apply 2 g to affected area daily as needed. Qty: 30 g, Refills: 0      testosterone cypionate (DEPOTESTOTERONE CYPIONATE) 200 mg/mL injection INJECT 0.5 MILLILITERS ONCE EVERY 7 DAYS  Qty: 2 mL, Refills: 5    Associated Diagnoses: Low testosterone in male      !! pregabalin (LYRICA) 225 mg capsule Take 1 Cap by mouth two (2) times a day. Max Daily Amount: 450 mg.  Qty: 20 Cap, Refills: 0    Associated Diagnoses: Chronic pain syndrome      buPROPion  mg Tb24 Take 450 mg by mouth every morning. Qty: 14 Tab, Refills: 1      DULoxetine (CYMBALTA) 60 mg capsule Take 1 Cap by mouth daily. Qty: 14 Cap, Refills: 1      LORazepam (ATIVAN) 1 mg tablet Take 1 Tab by mouth every eight (8) hours as needed for Anxiety. Max Daily Amount: 3 mg. TAPERING DOSE, DO NOT REFILL  Qty: 21 Tab, Refills: 0    Associated Diagnoses: Major depressive disorder, remission status unspecified, unspecified whether recurrent       !! - Potential duplicate medications found. Please discuss with provider.       CONTINUE these medications which have NOT CHANGED    Details   !! carvedilol (COREG) 25 mg tablet TAKE ONE TABLET BY MOUTH TWICE A DAY WITH MEALS  Qty: 180 Tab, Refills: 2      !! LYRICA 225 mg capsule 225 mg two (2) times a day. !! tiZANidine (ZANAFLEX) 2 mg tablet Take 2 mg by mouth daily as needed. CALCIUM 600 WITH VITAMIN D3 600 mg(1,500mg) -400 unit chew       !! methadone (DOLOPHINE) 10 mg tablet Take 10 mg by mouth two (2) times a day. !! lisinopril (PRINIVIL, ZESTRIL) 40 mg tablet Take 1 tablet by mouth daily. Qty: 30 tablet, Refills: 10      polyethylene glycol (MIRALAX) 17 gram/dose powder 17 g four (4) times daily. omeprazole (PRILOSEC) 20 mg capsule TAKE ONE CAPSULE BY MOUTH EVERY DAY  Qty: 30 Cap, Refills: 1       !! - Potential duplicate medications found. Please discuss with provider. STOP taking these medications       ondansetron (ZOFRAN ODT) 4 mg disintegrating tablet Comments:   Reason for Stopping:         methylphenidate (RITALIN) 10 mg tablet Comments:   Reason for Stopping:         HYDROcodone-acetaminophen (NORCO)  mg tablet Comments:   Reason for Stopping:               * Follow-up Care/Patient Instructions: Activity: Activity as tolerated  Diet: Regular Diet  Wound Care: aquacel ag to whith slough area, apply carrasyn gel cover with adapticure with mepilex foam    Follow-up Information     Follow up With Specialties Details Why Contact Info    Rosi Amaro DDS Dental General [facility] Schedule an appointment as soon as possible for a visit This is the dentist. please call and schedule your appointment. this dentist is in network. [id number] Kaitlyn Chatman MD Psychiatry On 4/1/2019 Your appointment at 8:30 am. Please be on time more than 10 min late the doctor will not see you. if you do a no call no show you will not be able to re-schedule with this office.  [facility] [address] [facility]      Bibiana Correa MD General Surgery, Breast Surgery, Oncology On 1/31/2019  Office  # [phone] your appoinment time 1:00pm. please arrive 15 min early for your appointment. [address]  [address] [facility] Internal Medicine Internal Medicine On 2/8/2019 Your appointment time is 3:30. Nurse practitioner [place]. [id number] Yared Reynaga MD Internal Medicine  You will need to Walk in to his office during office hours and get your Pain Medication regemin. [address]  [phone]      Emmanuelle Perez MD Family Practice On 2/12/2019 Your appointment time is 10:00am. please keep this appointment. [facility]  [phone]      [address] [place] [address] DME Services  Please reorder Rollator  ( with cushion, break & basket if cover by insurance), at discharge from the Rehab-  [address] [facility] [address]  [phone]    [address] [place] [address], Nursing care, Wound care, Pain managment, Therapy. Case Management- to arrange Home Health. Medicaid [facility]  [phone]      Wound Care [place]     Clean the wound in Left Thigh with NS or Wound Cleanser. Dry gently with 4x4 apply  Small amount of Carrasyn gel to the wound, place non adherent adaptic and cover the wound with Mepilex border foam.    Hannah Em, [address] [place] [address] Harish Lopez  [phone]          > 30 minutes spent coordinating care for discharge.   Luz Childers DO  1/24/2019  9:23 AM

## 2019-01-28 ENCOUNTER — TELEPHONE (OUTPATIENT)
Dept: CASE MANAGEMENT | Age: 69
End: 2019-01-28

## 2019-01-28 NOTE — TELEPHONE ENCOUNTER
Care Management Follow-up call  Left message for SW at the facility to call me   Received call from Ms. Marquez the Noom at [employer] [facility]. She states she received a call from the patient. Indicates to her I had not made his follow-up appointments. She indicates he informed her he trust her. She informed her she had talked to me. Reviewed the appointments and referrals from the AVS.  Discussed referral to [facility] . Discussed the need for transportation. Discussed I had off the [facility] through [address] [place] [address] who we have a contract with - as an options for his transition. Discussed I had offer to send [employer] to pick him from the facility to go home to get his cards to pay his bills and back to the facility if he still needs Skilled Care. Discussed the  will work with him at the facility. Gave her the # to the Facility so she can connect with them to prevent confusion in his discharge plan and everyone on the same page to assist him. Waiting for call back from the SW at the Nursing to discuss the above.     [address] RUBI RN   [phone]

## 2019-01-29 ENCOUNTER — TELEPHONE (OUTPATIENT)
Dept: CASE MANAGEMENT | Age: 69
End: 2019-01-29

## 2019-01-30 ENCOUNTER — TELEPHONE (OUTPATIENT)
Dept: SURGERY | Age: 69
End: 2019-01-30

## 2019-01-30 NOTE — TELEPHONE ENCOUNTER
Received call from [facility] with [facility] asking if Dr. Sarah Post will be following patient for wound care services. Please call her.

## 2019-01-30 NOTE — TELEPHONE ENCOUNTER
Returned call to [place]. Left voicemail advising patient has an appointment tomorrow to see Dr. Sparkle Vallejo. Left number for return call.

## 2019-01-31 ENCOUNTER — TELEPHONE (OUTPATIENT)
Dept: CASE MANAGEMENT | Age: 69
End: 2019-01-31

## 2019-01-31 ENCOUNTER — OFFICE VISIT (OUTPATIENT)
Dept: SURGERY | Age: 69
End: 2019-01-31

## 2019-01-31 VITALS
BODY MASS INDEX: 25.45 KG/M2 | HEART RATE: 78 BPM | WEIGHT: 220 LBS | HEIGHT: 78 IN | DIASTOLIC BLOOD PRESSURE: 74 MMHG | RESPIRATION RATE: 18 BRPM | SYSTOLIC BLOOD PRESSURE: 122 MMHG | TEMPERATURE: 98.6 F | OXYGEN SATURATION: 97 %

## 2019-01-31 DIAGNOSIS — L02.91 ABSCESS: Primary | ICD-10-CM

## 2019-01-31 NOTE — PROGRESS NOTES
HISTORY OF PRESENT ILLNESS  Rexie Riedel is a 77 y.o. male who returns for post-operative evaluation. Mr. Citlali Samuels is s/p drainage of a left lateral thigh abscess on 1/14/2019. Discharged to home on 1/24/2019. Doing well since then. Wound vac therapy has been discontinued. Receiving local wound care by visiting nurses and this is going well. No complaints today. Review of systems negative except as noted. Review of Systems   Constitutional: Negative for chills and fever. Gastrointestinal: Negative for nausea and vomiting. Musculoskeletal:        Denies left lateral thigh pain at site of wound. Physical Exam   Constitutional: He appears well-developed and well-nourished. No distress. Cardiovascular: Normal rate and regular rhythm. Pulmonary/Chest: Effort normal and breath sounds normal.   Abdominal: Soft. He exhibits no distension. There is no tenderness. Musculoskeletal: Normal range of motion. Neurological: He is alert. Skin:   Left lateral thigh wound is clean and granulating. No associated cellulitis or induration. No purulent drainage. Vitals reviewed. ASSESSMENT and PLAN  Wound repacked with saline soaked stephanie. Reassured Mr. Citlali Samuels that he is doing well and that the wound is healing nicely. No need for further abx therapy. Continue local wound care as before. Asked him to follow up with Dr. Kishore Beatty as scheduled. Will see in two more weeks or earlier if need be.      CC: Otoniel Cao MD

## 2019-01-31 NOTE — TELEPHONE ENCOUNTER
Care Management Follow-up Call  Received call from the  at [facility]. She arranged transportation for his appointment today with Dr. Shayy Bowman and His PCP appointment with Dr. Evelyn Ledezma tomorrow. All of these are new patient appointments. [facility] with [facility] for [facility] and javier. They ordered him a Rollator. Our Department paid for [facility]- 3 hours -picked patient up from the Nursing Home-and took him home. Patient needs to keep his appointments to establish care since he changed insurance effective Jan 2019. He was given a list of Dentists to arrange for an appointment. He indicates he switch from [facility] due to this benefit. He indicates he needs Dental Care completed before he can get surgery for his back/knee.      [facility] RUBI RN   [phone]

## 2019-01-31 NOTE — PROGRESS NOTES
1. Have you been to the ER, urgent care clinic since your last visit? Hospitalized since your last visit? No    2. Have you seen or consulted any other health care providers outside of the [address] [place] [address] since your last visit? Include any pap smears or colon screening.  No

## 2019-02-01 ENCOUNTER — TELEPHONE (OUTPATIENT)
Dept: SURGERY | Age: 69
End: 2019-02-01

## 2019-02-01 ENCOUNTER — OFFICE VISIT (OUTPATIENT)
Dept: INTERNAL MEDICINE CLINIC | Age: 69
End: 2019-02-01

## 2019-02-01 ENCOUNTER — PATIENT OUTREACH (OUTPATIENT)
Dept: INTERNAL MEDICINE CLINIC | Age: 69
End: 2019-02-01

## 2019-02-01 VITALS
WEIGHT: 202 LBS | HEIGHT: 78 IN | OXYGEN SATURATION: 95 % | HEART RATE: 61 BPM | BODY MASS INDEX: 23.37 KG/M2 | RESPIRATION RATE: 19 BRPM | DIASTOLIC BLOOD PRESSURE: 54 MMHG | SYSTOLIC BLOOD PRESSURE: 134 MMHG | TEMPERATURE: 98.2 F

## 2019-02-01 DIAGNOSIS — K08.9 POOR DENTITION: ICD-10-CM

## 2019-02-01 DIAGNOSIS — E34.9 TESTOSTERONE DEFICIENCY: ICD-10-CM

## 2019-02-01 DIAGNOSIS — M17.0 PRIMARY OSTEOARTHRITIS OF BOTH KNEES: ICD-10-CM

## 2019-02-01 DIAGNOSIS — F41.9 ANXIETY DISORDER, UNSPECIFIED TYPE: ICD-10-CM

## 2019-02-01 DIAGNOSIS — M48.02 SPINAL STENOSIS OF CERVICAL REGION: ICD-10-CM

## 2019-02-01 DIAGNOSIS — F33.9 RECURRENT MAJOR DEPRESSIVE DISORDER, REMISSION STATUS UNSPECIFIED (HCC): ICD-10-CM

## 2019-02-01 DIAGNOSIS — I10 ESSENTIAL HYPERTENSION: ICD-10-CM

## 2019-02-01 DIAGNOSIS — K21.9 GASTROESOPHAGEAL REFLUX DISEASE WITHOUT ESOPHAGITIS: ICD-10-CM

## 2019-02-01 DIAGNOSIS — F11.90 METHADONE USE: ICD-10-CM

## 2019-02-01 DIAGNOSIS — Z12.11 COLON CANCER SCREENING: ICD-10-CM

## 2019-02-01 DIAGNOSIS — Z76.89 ESTABLISHING CARE WITH NEW DOCTOR, ENCOUNTER FOR: Primary | ICD-10-CM

## 2019-02-01 RX ORDER — ONDANSETRON 8 MG/1
8 TABLET, ORALLY DISINTEGRATING ORAL
Qty: 30 TAB | Refills: 2 | Status: SHIPPED | OUTPATIENT
Start: 2019-02-01 | End: 2019-10-09 | Stop reason: SDUPTHER

## 2019-02-01 NOTE — TELEPHONE ENCOUNTER
Spoke with Ananth nurse with [facility] [phone] states they will be taking over wound care for patient since his discharge from facility. Wound vac discontinued she will send over plan of care to be signed. They will see patient every other day for wet-dry dressing changes. She states patient has supplies on hand wound gel, guaze, etc.  They were unable to do a full medication reconciliation because patient has not filled any of his prescriptions. Patient is scheduled to see PCP today Dr. Lamonte Young. She has instructed him to review medications with her and the low BP readings they got at last visit. She also thinks patient may need PT/OT consult but she will contact Dr. Raffy Simental office if needed to discuss. She will call if any other questions or concerns.

## 2019-02-01 NOTE — PROGRESS NOTES
Chief Complaint   Patient presents with   [address] [place] [address]     1. Have you been to the ER, urgent care clinic since your last visit? Hospitalized since your last visit? Yes When: 1/12/2019 [facility] for celluitis    2. Have you seen or consulted any other health care providers outside of the [address] [place] [address] since your last visit? Include any pap smears or colon screening.  DR Selena Picker Dr Mortimer Sides

## 2019-02-01 NOTE — PROGRESS NOTES
Hospital Discharge Follow-Up      Date/Time:  2019 4:38 PM    Patient was admitted to [facility] on  and discharged on  for Cellulitis. The physician discharge summary was available at the time of outreach. Patient was contacted within 8 business days of discharge- During new pt appointment/HARRIS. Top Challenges reviewed with the provider   Needs to schedule dental apt before can have knee surgery. Needs to attend a walk in appt with Dr Jean Claude Machado for pain med       Method of communication with provider :face to face    Inpatient RRAT score: n/a  Was this a readmission? no   Patient stated reason for the readmission: N/A    Nurse Navigator (NN) contacted the patient during office visit to perform post hospital discharge assessment. Verified name and  with patient as identifiers. Provided introduction to self, and explanation of the Nurse Navigator role. Reviewed discharge instructions and red flags with patient who verbalized understanding. Patient given an opportunity to ask questions and does not have any further questions or concerns at this time. The patient agrees to contact the PCP office for questions related to their healthcare. NN provided contact information for future reference. Disease Specific:   N/A    Summary of patient's top problems:  1. Cellulitis- I&D to L thigh, uses a walker  2. No psychiatry following in at least 6 months- medications needed. 3.     Home Health orders at discharge: [facility]: [facility]  Date of initial visit:   Pt also receives wound care via [facility] started on 19  Durable Medical Equipment ordered/company: n/a  Durable Medical Equipment received: n/a    Barriers to care? transportation- pt uses Uber/Lyft which can become expensive on a limited income. This writer gives pt flyer about teressa david for apt's at [facility].      Advance Care Planning:   Does patient have an Advance Directive:  not on file; education provided     Medication(s):   New Medications at Discharge:   polyethylene glycol (MIRALAX) 17 gram packet Take 1 Packet by mouth four (4) times daily as needed. Qty: 30 Packet, Refills: 0       pantoprazole (PROTONIX) 40 mg tablet Take 1 Tab by mouth Daily (before breakfast). Qty: 20 Tab, Refills: 0       calcium-vitamin D (OYSTER SHELL) 500 mg(1,250mg) -200 unit per tablet Take 1 Tab by mouth daily. Qty: 20 Tab, Refills: 0       !! carvedilol (COREG) 25 mg tablet Take 1 Tab by mouth two (2) times daily (with meals). Qty: 20 Tab, Refills: 0       !! naloxone (EVZIO) 2 mg/0.4 mL auto-injector 0.4 mL by IntraMUSCular route once as needed for Overdose for up to 1 dose. Qty: 3 Device, Refills: 0       !! naloxone (EVZIO) 2 mg/0.4 mL auto-injector 0.4 mL by IntraMUSCular route once as needed for Overdose for up to 1 dose. Qty: 3 Device, Refills: 0       busPIRone (BUSPAR) 5 mg tablet Take 1 Tab by mouth two (2) times a day. Qty: 28 Tab, Refills: 1       Changed Medications at Discharge:   lisinopril (PRINIVIL, ZESTRIL) 40 mg tablet Take 1 Tab by mouth daily. Qty: 30 Tab, Refills: 0       !! methadone (DOLOPHINE) 10 mg tablet Take 1 Tab by mouth two (2) times a day. Max Daily Amount: 20 mg.  Qty: 20 Tab, Refills: 0     Associated Diagnoses: Chronic pain syndrome       !! tiZANidine (ZANAFLEX) 4 mg tablet Take 1 Tab by mouth two (2) times daily as needed. Qty: 20 Tab, Refills: 0       traZODone (DESYREL) 50 mg tablet Take 1 Tab by mouth nightly as needed for Sleep. Qty: 20 Tab, Refills: 0       VOLTAREN 1 % gel Apply 2 g to affected area daily as needed. Qty: 30 g, Refills: 0       testosterone cypionate (DEPOTESTOTERONE CYPIONATE) 200 mg/mL injection INJECT 0.5 MILLILITERS ONCE EVERY 7 DAYS  Qty: 2 mL, Refills: 5     Associated Diagnoses: Low testosterone in male       !! pregabalin (LYRICA) 225 mg capsule Take 1 Cap by mouth two (2) times a day.  Max Daily Amount: 450 mg.  Qty: 20 Cap, Refills: 0     Associated Diagnoses: Chronic pain syndrome       buPROPion  mg Tb24 Take 450 mg by mouth every morning. Qty: 14 Tab, Refills: 1       DULoxetine (CYMBALTA) 60 mg capsule Take 1 Cap by mouth daily. Qty: 14 Cap, Refills: 1       LORazepam (ATIVAN) 1 mg tablet Take 1 Tab by mouth every eight (8) hours as needed for Anxiety. Max Daily Amount: 3 mg. TAPERING DOSE, DO NOT REFILL  Qty: 21 Tab, Refills: 0     Associated Diagnoses: Major depressive disorder, remission status unspecified, unspecified whether recurrent         Discontinued Medications at Discharge:   ondansetron (ZOFRAN ODT) 4 mg disintegrating tablet Comments:   Reason for Stopping:             methylphenidate (RITALIN) 10 mg tablet Comments:   Reason for Stopping:            HYDROcodone-acetaminophen (NORCO)  mg tablet Comments:   Reason for Stopping:          Medication reconciliation was performed with patient, who verbalizes understanding of administration of home medications. There were no barriers to obtaining medications identified at this time. Referral to Pharm D needed: no     Current Outpatient Medications   Medication Sig    ondansetron (ZOFRAN ODT) 8 mg disintegrating tablet Take 1 Tab by mouth every twelve (12) hours as needed for Nausea.  lisinopril (PRINIVIL, ZESTRIL) 40 mg tablet Take 1 Tab by mouth daily.  methadone (DOLOPHINE) 10 mg tablet Take 1 Tab by mouth two (2) times a day. Max Daily Amount: 20 mg.  polyethylene glycol (MIRALAX) 17 gram packet Take 1 Packet by mouth four (4) times daily as needed.  tiZANidine (ZANAFLEX) 4 mg tablet Take 1 Tab by mouth two (2) times daily as needed.  traZODone (DESYREL) 50 mg tablet Take 1 Tab by mouth nightly as needed for Sleep.  VOLTAREN 1 % gel Apply 2 g to affected area daily as needed.  pantoprazole (PROTONIX) 40 mg tablet Take 1 Tab by mouth Daily (before breakfast).     testosterone cypionate (DEPOTESTOTERONE CYPIONATE) 200 mg/mL injection INJECT 0.5 MILLILITERS ONCE EVERY 7 DAYS    calcium-vitamin D (OYSTER SHELL) 500 mg(1,250mg) -200 unit per tablet Take 1 Tab by mouth daily.  carvedilol (COREG) 25 mg tablet Take 1 Tab by mouth two (2) times daily (with meals).  pregabalin (LYRICA) 225 mg capsule Take 1 Cap by mouth two (2) times a day. Max Daily Amount: 450 mg.    HYDROcodone-acetaminophen (NORCO) 5-325 mg per tablet Take 1 Tab by mouth every four (4) hours as needed. Max Daily Amount: 6 Tabs.  buPROPion  mg Tb24 Take 450 mg by mouth every morning.  busPIRone (BUSPAR) 5 mg tablet Take 1 Tab by mouth two (2) times a day.  DULoxetine (CYMBALTA) 60 mg capsule Take 1 Cap by mouth daily.  LORazepam (ATIVAN) 1 mg tablet Take 1 Tab by mouth every eight (8) hours as needed for Anxiety. Max Daily Amount: 3 mg. TAPERING DOSE, DO NOT REFILL    carvedilol (COREG) 25 mg tablet TAKE ONE TABLET BY MOUTH TWICE A DAY WITH MEALS    LYRICA 225 mg capsule 225 mg two (2) times a day.  CALCIUM 600 WITH VITAMIN D3 600 mg(1,500mg) -400 unit chew     methadone (DOLOPHINE) 10 mg tablet Take 10 mg by mouth two (2) times a day.  lisinopril (PRINIVIL, ZESTRIL) 40 mg tablet Take 1 tablet by mouth daily.  polyethylene glycol (MIRALAX) 17 gram/dose powder 17 g four (4) times daily.  omeprazole (PRILOSEC) 20 mg capsule TAKE ONE CAPSULE BY MOUTH EVERY DAY     No current facility-administered medications for this visit. There are no discontinued medications. [facility] follow up appointment(s):   Future Appointments   Date Time Provider \[facility] [place] [facility]\""   2/8/2019 11:50 AM Richar Knowles MD [facility] [address] 2/14/2019  1:20 PM Linda Nguyễn MD [facility]   4/1/2019  9:00 AM Jayden Pringle MD [facility]   5/6/2019  2:00 PM Sally Rodney  [address]      Non-[facility] follow up appointment(s):   Pt to scheduled dental appointment- required before knee surgery.  Writer margarita assistance. [facility]:  information provided as a resource       Goals        Patient [address] (pt-stated)      Mr. Erika Dillon is s/p admission for Cellulitis to his left leg. He uses a walker, lives alone and has a limited support system. He has denied some services while accepting others. Mr. Erika Dillon is in need of knee surgery but a dental appointment is needed first. Mr. Erika Dillon has agreed to schedule this appointment on his own by 2/15. Reassess by 2/15 (ms). Other     Attends follow-up appointments as directed. Mr. Erika Dillon has pain that requires prescription medication. In order to receive this he will need to f/u with Dr Ewa Wayne- pain medicine as a walk in pt. Pt agrees to do this, reassess by 2/15 (ms).

## 2019-02-01 NOTE — PROGRESS NOTES
Farhad Cat is a 77 y.o. male and presents with Establish Care  . Subjective:    First visit  Establish Care  Pt was hospitalized January for cellulitis/abscess due to a wound that occurred w fall. Pt was hospitalized 1/12/19-1/24/19 and was d/c'ed to SNF. Pt will need knee surgery due to OA, but has VERY POOR dentition that needs to be addressed. Pt request a refill of zofran. PMH-HTN   Spinal stenosis/OA/neuropathy s/p cervical surgery. Pain Mngmnt Dr. Jus Cooper   Testosterone def   Major depression/anxiety-appt scheduled already Feb 18th   GERD    PSH-c spine   T&A    SH-   Lives alone   Not currently sex active   No tob/etoh/illicit drugs    HM  Colonoscopy never  Immunizations ? ? Eye care s/p cataract extraction  Dental care ? ? Review of Systems  Constitutional: negative for fevers, chills, anorexia and weight loss  Eyes:   negative for visual disturbance and irritation  ENT:   negative for tinnitus,sore throat,nasal congestion,ear pains. hoarseness  Respiratory:  negative for cough, hemoptysis, dyspnea,wheezing  CV:   negative for chest pain, palpitations, lower extremity edema  GI:   positive for GERD  Musculoskel: positive for  joint pain  Neurological:  negative for headaches, dizziness, vertigo, memory problems and gait   Behavl/Psych: negative for feelings of anxiety, depression, mood changes    Past Medical History:   Diagnosis Date    Abscess 1/14/2019    Anemia NEC     Asthma     Chronic pain     Due to spinal stenosis    Constipation     HTN (hypertension)     Hypertension     Hypogonadism male     Other ill-defined conditions(799.89)     spinal stenosis    Psychiatric disorder     major depressive disorder    Sleep trouble     Spinal stenosis     Cervical and Lumbar    Vertebral compression fracture (HCC)      Past Surgical History:   Procedure Laterality Date    HX BACK SURGERY      HX CATARACT REMOVAL Bilateral     HX CERVICAL LAMINECTOMY      HX CYST INCISION AND DRAINAGE  01/14/2019    Drain left lateral thigh abscess    HX TONSILLECTOMY       Social History     Socioeconomic History    Marital status:      Spouse name: Not on file    Number of children: Not on file    Years of education: Not on file    Highest education level: Not on file   Tobacco Use    Smoking status: Never Smoker    Smokeless tobacco: Never Used   Substance and Sexual Activity    Alcohol use: No     Comment: Drank six weeks ago.  Drug use: No    Sexual activity: No     Family History   Problem Relation Age of Onset    Heart Disease Father     Hypertension Father     Stroke Father      Current Outpatient Medications   Medication Sig Dispense Refill    ondansetron (ZOFRAN ODT) 8 mg disintegrating tablet Take 1 Tab by mouth every twelve (12) hours as needed for Nausea. 30 Tab 2    methadone (DOLOPHINE) 10 mg tablet Take 1 Tab by mouth two (2) times a day. Max Daily Amount: 20 mg. 20 Tab 0    polyethylene glycol (MIRALAX) 17 gram packet Take 1 Packet by mouth four (4) times daily as needed. 30 Packet 0    tiZANidine (ZANAFLEX) 4 mg tablet Take 1 Tab by mouth two (2) times daily as needed. 20 Tab 0    traZODone (DESYREL) 50 mg tablet Take 1 Tab by mouth nightly as needed for Sleep. 20 Tab 0    VOLTAREN 1 % gel Apply 2 g to affected area daily as needed. 30 g 0    pantoprazole (PROTONIX) 40 mg tablet Take 1 Tab by mouth Daily (before breakfast). 20 Tab 0    testosterone cypionate (DEPOTESTOTERONE CYPIONATE) 200 mg/mL injection INJECT 0.5 MILLILITERS ONCE EVERY 7 DAYS 2 mL 5    calcium-vitamin D (OYSTER SHELL) 500 mg(1,250mg) -200 unit per tablet Take 1 Tab by mouth daily. 20 Tab 0    pregabalin (LYRICA) 225 mg capsule Take 1 Cap by mouth two (2) times a day. Max Daily Amount: 450 mg. 20 Cap 0    HYDROcodone-acetaminophen (NORCO) 5-325 mg per tablet Take 1 Tab by mouth every four (4) hours as needed. Max Daily Amount: 6 Tabs.  20 Tab 0    buPROPion  mg Tb24 Take 450 mg by mouth every morning. 14 Tab 1    busPIRone (BUSPAR) 5 mg tablet Take 1 Tab by mouth two (2) times a day. 28 Tab 1    DULoxetine (CYMBALTA) 60 mg capsule Take 1 Cap by mouth daily. 14 Cap 1    LORazepam (ATIVAN) 1 mg tablet Take 1 Tab by mouth every eight (8) hours as needed for Anxiety. Max Daily Amount: 3 mg. TAPERING DOSE, DO NOT REFILL 21 Tab 0    carvedilol (COREG) 25 mg tablet TAKE ONE TABLET BY MOUTH TWICE A DAY WITH MEALS 180 Tab 2    lisinopril (PRINIVIL, ZESTRIL) 40 mg tablet Take 1 tablet by mouth daily. 30 tablet 10    omeprazole (PRILOSEC) 20 mg capsule TAKE ONE CAPSULE BY MOUTH EVERY DAY 30 Cap 1    lisinopril (PRINIVIL, ZESTRIL) 40 mg tablet Take 1 Tab by mouth daily. 30 Tab 0    carvedilol (COREG) 25 mg tablet Take 1 Tab by mouth two (2) times daily (with meals). 20 Tab 0    LYRICA 225 mg capsule 225 mg two (2) times a day.  CALCIUM 600 WITH VITAMIN D3 600 mg(1,500mg) -400 unit chew       methadone (DOLOPHINE) 10 mg tablet Take 10 mg by mouth two (2) times a day.  polyethylene glycol (MIRALAX) 17 gram/dose powder 17 g four (4) times daily. Allergies   Allergen Reactions    Amitriptyline Other (comments)     hallucinating    Neurontin [Gabapentin] Other (comments)     insomnia    Percocet [Oxycodone-Acetaminophen] Nausea Only and Anxiety     Pt states he can tolerate small doses at 10 mg a day       Objective:  Visit Vitals  /54 (BP 1 Location: Left arm, BP Patient Position: Sitting)   Pulse 61   Temp 98.2 °F (36.8 °C) (Oral)   Resp 19   Ht 6' 7\" (2.007 m)   Wt 202 lb (91.6 kg)   SpO2 95%   BMI 22.76 kg/m²     Physical Exam:   General appearance - alert, elderly man.  Appears frail and has an unsteady gait  Mental status - alert, oriented to person, place, and time  EYE-EOMI  Mouth - mucous membranes moist, pharynx normal without lesions POOR dentition and partially edentulous  Neck - supple, no significant adenopathy   Chest - clear to auscultation, no wheezes, rales or rhonchi, symmetric air entry   Heart - normal rate, regular rhythm, normal S1, S2  Abdomen - soft, nontender, +bs  Ext-peripheral pulses normal, no pedal edema, no clubbing or cyanosis  Skin-Warm and dry. no hyperpigmentation, vitiligo, or suspicious lesions  Neuro -alert, oriented, normal speech, walks w aid of walker      Results for orders placed or performed during the hospital encounter of 01/12/19   CULTURE, WOUND W GRAM STAIN   Result Value Ref Range    Special Requests: NO SPECIAL REQUESTS      GRAM STAIN NO DEFINITE ORGANISM SEEN      Culture result: LIGHT MICROAEROPHILIC STREPTOCOCCUS (A)     CULTURE, ANAEROBIC   Result Value Ref Range    Special Requests: LEFT     Culture result: NO ANAEROBES ISOLATED     CBC WITH AUTOMATED DIFF   Result Value Ref Range    WBC 13.4 (H) 4.1 - 11.1 K/uL    RBC 3.84 (L) 4.10 - 5.70 M/uL    HGB 11.6 (L) 12.1 - 17.0 g/dL    HCT 36.0 (L) 36.6 - 50.3 %    MCV 93.8 80.0 - 99.0 FL    MCH 30.2 26.0 - 34.0 PG    MCHC 32.2 30.0 - 36.5 g/dL    RDW 13.8 11.5 - 14.5 %    PLATELET 122 517 - 167 K/uL    MPV 11.0 8.9 - 12.9 FL    NRBC 0.0 0  WBC    ABSOLUTE NRBC 0.00 0.00 - 0.01 K/uL    NEUTROPHILS 84 (H) 32 - 75 %    LYMPHOCYTES 5 (L) 12 - 49 %    MONOCYTES 10 5 - 13 %    EOSINOPHILS 0 0 - 7 %    BASOPHILS 0 0 - 1 %    IMMATURE GRANULOCYTES 1 (H) 0.0 - 0.5 %    ABS. NEUTROPHILS 11.3 (H) 1.8 - 8.0 K/UL    ABS. LYMPHOCYTES 0.7 (L) 0.8 - 3.5 K/UL    ABS. MONOCYTES 1.3 (H) 0.0 - 1.0 K/UL    ABS. EOSINOPHILS 0.0 0.0 - 0.4 K/UL    ABS. BASOPHILS 0.0 0.0 - 0.1 K/UL    ABS. IMM.  GRANS. 0.1 (H) 0.00 - 0.04 K/UL    DF SMEAR SCANNED      RBC COMMENTS NORMOCYTIC, NORMOCHROMIC      WBC COMMENTS TOXIC GRANULATION     METABOLIC PANEL, COMPREHENSIVE   Result Value Ref Range    Sodium 140 136 - 145 mmol/L    Potassium 4.0 3.5 - 5.1 mmol/L    Chloride 103 97 - 108 mmol/L    CO2 27 21 - 32 mmol/L    Anion gap 10 5 - 15 mmol/L    Glucose 120 (H) 65 - 100 mg/dL    BUN 36 (H) 6 - 20 MG/DL    Creatinine 1.25 0.70 - 1.30 MG/DL    BUN/Creatinine ratio 29 (H) 12 - 20      GFR est AA >60 >60 ml/min/1.73m2    GFR est non-AA 57 (L) >60 ml/min/1.73m2    Calcium 8.9 8.5 - 10.1 MG/DL    Bilirubin, total 1.4 (H) 0.2 - 1.0 MG/DL    ALT (SGPT) 31 12 - 78 U/L    AST (SGOT) 61 (H) 15 - 37 U/L    Alk. phosphatase 86 45 - 117 U/L    Protein, total 6.1 (L) 6.4 - 8.2 g/dL    Albumin 2.7 (L) 3.5 - 5.0 g/dL    Globulin 3.4 2.0 - 4.0 g/dL    A-G Ratio 0.8 (L) 1.1 - 2.2     CK   Result Value Ref Range    CK 1,324 (H) 39 - 491 U/L   METABOLIC PANEL, BASIC   Result Value Ref Range    Sodium 139 136 - 145 mmol/L    Potassium 4.1 3.5 - 5.1 mmol/L    Chloride 106 97 - 108 mmol/L    CO2 26 21 - 32 mmol/L    Anion gap 7 5 - 15 mmol/L    Glucose 117 (H) 65 - 100 mg/dL    BUN 37 (H) 6 - 20 MG/DL    Creatinine 1.04 0.70 - 1.30 MG/DL    BUN/Creatinine ratio 36 (H) 12 - 20      GFR est AA >60 >60 ml/min/1.73m2    GFR est non-AA >60 >60 ml/min/1.73m2    Calcium 8.5 8.5 - 10.1 MG/DL   CBC WITH AUTOMATED DIFF   Result Value Ref Range    WBC 13.7 (H) 4.1 - 11.1 K/uL    RBC 3.46 (L) 4.10 - 5.70 M/uL    HGB 10.7 (L) 12.1 - 17.0 g/dL    HCT 32.5 (L) 36.6 - 50.3 %    MCV 93.9 80.0 - 99.0 FL    MCH 30.9 26.0 - 34.0 PG    MCHC 32.9 30.0 - 36.5 g/dL    RDW 13.8 11.5 - 14.5 %    PLATELET 472 058 - 443 K/uL    MPV 11.5 8.9 - 12.9 FL    NRBC 0.0 0  WBC    ABSOLUTE NRBC 0.00 0.00 - 0.01 K/uL    NEUTROPHILS 75 32 - 75 %    BAND NEUTROPHILS 7 (H) 0 - 6 %    LYMPHOCYTES 12 12 - 49 %    MONOCYTES 5 5 - 13 %    EOSINOPHILS 1 0 - 7 %    BASOPHILS 0 0 - 1 %    IMMATURE GRANULOCYTES 0 %    ABS. NEUTROPHILS 11.3 (H) 1.8 - 8.0 K/UL    ABS. LYMPHOCYTES 1.6 0.8 - 3.5 K/UL    ABS. MONOCYTES 0.7 0.0 - 1.0 K/UL    ABS. EOSINOPHILS 0.1 0.0 - 0.4 K/UL    ABS. BASOPHILS 0.0 0.0 - 0.1 K/UL    ABS. IMM.  GRANS. 0.0 K/UL    DF MANUAL      RBC COMMENTS NORMOCYTIC, NORMOCHROMIC     CREATININE   Result Value Ref Range    Creatinine 0.77 0.70 - 1.30 MG/DL    GFR est AA >60 >60 ml/min/1.73m2    GFR est non-AA >60 >60 ml/min/1.73m2   CREATININE   Result Value Ref Range    Creatinine 0.87 0.70 - 1.30 MG/DL    GFR est AA >60 >60 ml/min/1.73m2    GFR est non-AA >60 >60 ml/min/1.73m2   VANCOMYCIN, TROUGH   Result Value Ref Range    Vancomycin,trough 11.8 (H) 5.0 - 10.0 ug/mL    Reported dose date: NOT PROVIDED      Reported dose time: NOT PROVIDED      Reported dose: NOT PROVIDED UNITS   CBC W/O DIFF   Result Value Ref Range    WBC 11.7 (H) 4.1 - 11.1 K/uL    RBC 3.65 (L) 4.10 - 5.70 M/uL    HGB 11.1 (L) 12.1 - 17.0 g/dL    HCT 33.8 (L) 36.6 - 50.3 %    MCV 92.6 80.0 - 99.0 FL    MCH 30.4 26.0 - 34.0 PG    MCHC 32.8 30.0 - 36.5 g/dL    RDW 14.1 11.5 - 14.5 %    PLATELET 308 630 - 501 K/uL    MPV 10.3 8.9 - 12.9 FL    NRBC 0.0 0  WBC    ABSOLUTE NRBC 0.00 0.00 - 9.93 K/uL   METABOLIC PANEL, BASIC   Result Value Ref Range    Sodium 139 136 - 145 mmol/L    Potassium 3.7 3.5 - 5.1 mmol/L    Chloride 103 97 - 108 mmol/L    CO2 30 21 - 32 mmol/L    Anion gap 6 5 - 15 mmol/L    Glucose 80 65 - 100 mg/dL    BUN 16 6 - 20 MG/DL    Creatinine 0.81 0.70 - 1.30 MG/DL    BUN/Creatinine ratio 20 12 - 20      GFR est AA >60 >60 ml/min/1.73m2    GFR est non-AA >60 >60 ml/min/1.73m2    Calcium 8.4 (L) 8.5 - 10.1 MG/DL   VANCOMYCIN, RANDOM   Result Value Ref Range    Vancomycin, random 27.7 UG/ML   CREATININE   Result Value Ref Range    Creatinine 0.40 (L) 0.70 - 1.30 MG/DL    GFR est AA >60 >60 ml/min/1.73m2    GFR est non-AA >60 >60 ml/min/1.73m2   CBC W/O DIFF   Result Value Ref Range    WBC 10.5 4.1 - 11.1 K/uL    RBC 4.18 4.10 - 5.70 M/uL    HGB 12.6 12.1 - 17.0 g/dL    HCT 38.9 36.6 - 50.3 %    MCV 93.1 80.0 - 99.0 FL    MCH 30.1 26.0 - 34.0 PG    MCHC 32.4 30.0 - 36.5 g/dL    RDW 14.3 11.5 - 14.5 %    PLATELET 638 (H) 435 - 400 K/uL    MPV 9.8 8.9 - 12.9 FL    NRBC 0.0 0  WBC    ABSOLUTE NRBC 0.00 0.00 - 5.39 K/uL   METABOLIC PANEL, COMPREHENSIVE   Result Value Ref Range    Sodium 139 136 - 145 mmol/L    Potassium 4.3 3.5 - 5.1 mmol/L    Chloride 104 97 - 108 mmol/L    CO2 29 21 - 32 mmol/L    Anion gap 6 5 - 15 mmol/L    Glucose 108 (H) 65 - 100 mg/dL    BUN 15 6 - 20 MG/DL    Creatinine 0.95 0.70 - 1.30 MG/DL    BUN/Creatinine ratio 16 12 - 20      GFR est AA >60 >60 ml/min/1.73m2    GFR est non-AA >60 >60 ml/min/1.73m2    Calcium 9.4 8.5 - 10.1 MG/DL    Bilirubin, total 0.5 0.2 - 1.0 MG/DL    ALT (SGPT) 66 12 - 78 U/L    AST (SGOT) 35 15 - 37 U/L    Alk. phosphatase 93 45 - 117 U/L    Protein, total 6.3 (L) 6.4 - 8.2 g/dL    Albumin 2.8 (L) 3.5 - 5.0 g/dL    Globulin 3.5 2.0 - 4.0 g/dL    A-G Ratio 0.8 (L) 1.1 - 2.2     CK   Result Value Ref Range    CK 34 (L) 39 - 308 U/L       Assessment/Plan:    ICD-10-CM ICD-9-CM    1. Establishing care with new doctor, encounter for Z76.89 V65.8    2. Primary osteoarthritis of both knees M17.0 715.16 REFERRAL TO ORTHOPEDIC SURGERY   3. Essential hypertension I10 401.9    4. Spinal stenosis of cervical region M48.02 723.0    5. Testosterone deficiency E34.9 259.9    6. Poor dentition K08.9 525.9    7. Recurrent major depressive disorder, remission status unspecified (HCC) F33.9 296.30    8. Anxiety disorder, unspecified type F41.9 300.00    9. Colon cancer screening Z12.11 V76.51 COLOGUARD TEST (FECAL DNA COLORECTAL CANCER SCREENING)     Orders Placed This Encounter    COLOGUARD TEST (FECAL DNA COLORECTAL CANCER SCREENING)    REFERRAL TO ORTHOPEDIC SURGERY     Referral Priority:   Routine     Referral Type:   Consultation     Referral Reason:   Specialty Services Required     Referred to Provider:   Jamie Boo MD (Jody)     Requested Specialty:   Orthopedic Surgery     Number of Visits Requested:   1    ondansetron (ZOFRAN ODT) 8 mg disintegrating tablet     Sig: Take 1 Tab by mouth every twelve (12) hours as needed for Nausea. Dispense:  30 Tab     Refill:  2     1.  Establishing care with new doctor, encounter for  Completed    2. Primary osteoarthritis of both knees    - REFERRAL TO ORTHOPEDIC SURGERY    3. Essential hypertension  Controlled    4. Spinal stenosis of cervical region  Cont pain mngmnt f/u    5. Testosterone deficiency  Cont testosterone replacement    6. Poor dentition  Pt will make an appt w dentist    7. Recurrent major depressive disorder, remission status unspecified ([facility] [address]  F/u psych    8. Anxiety disorder, unspecified type  As above    9. Colon cancer screening    - COLOGUARD TEST (FECAL DNA COLORECTAL CANCER SCREENING)    10. Gastroesophageal reflux disease without esophagitis  Cont PPI  D/w pt change and he declines    There are no Patient Instructions on file for this visit. Follow-up Disposition:  Return in about 3 months (around 5/1/2019) for bp f/u. I have reviewed with the patient details of the assessment and plan and all questions were answered. Relevent patient education was performed. The most recent lab findings were reviewed with the patient. An After Visit Summary was printed and given to the patient.

## 2019-02-03 PROBLEM — F11.90 METHADONE USE: Status: ACTIVE | Noted: 2019-02-03

## 2019-02-04 ENCOUNTER — DOCUMENTATION ONLY (OUTPATIENT)
Dept: INTERNAL MEDICINE CLINIC | Age: 69
End: 2019-02-04

## 2019-02-08 ENCOUNTER — OFFICE VISIT (OUTPATIENT)
Dept: ENDOCRINOLOGY | Age: 69
End: 2019-02-08

## 2019-02-08 VITALS
HEIGHT: 78 IN | HEART RATE: 79 BPM | WEIGHT: 201.6 LBS | BODY MASS INDEX: 23.32 KG/M2 | SYSTOLIC BLOOD PRESSURE: 120 MMHG | DIASTOLIC BLOOD PRESSURE: 68 MMHG

## 2019-02-08 DIAGNOSIS — R79.89 LOW TESTOSTERONE IN MALE: Primary | ICD-10-CM

## 2019-02-08 DIAGNOSIS — G89.29 OTHER CHRONIC PAIN: ICD-10-CM

## 2019-02-08 DIAGNOSIS — I10 ESSENTIAL HYPERTENSION: ICD-10-CM

## 2019-02-08 NOTE — PATIENT INSTRUCTIONS
Continue  0.5 ml testosterone weekly -     Labs will reflect levels at end of dosing.           Blood pressure  Continue carvedilol to 25 mg twice daily  Continue lisinopril 40 mg

## 2019-02-08 NOTE — PROGRESS NOTES
History of Present Illness: Carlee Anderson is a 77 y.o. male presents for follow-up of low testosterone  He also has spinal stenosis and chronic pain. Has been taking methadone for this for years. Currently he is having continued problems with spinal stenosis and osteoarthritis in his knees. Hypogonadism: testosterone cypionate 200 mg/ml -0.5 ml (100 mg) every 7 days on Fridays. Due for injections today. HTN -carvedilol 25 mg BID and lisinopril 40 mg. Blood pressure improved. Weight is down 20 lbs due to lack of appetite in January. Past Medical History:   Diagnosis Date    Abscess 1/14/2019    Anemia NEC     Asthma     Chronic pain     Due to spinal stenosis    Constipation     HTN (hypertension)     Hypertension     Hypogonadism male     Other ill-defined conditions(339.95)     spinal stenosis    Psychiatric disorder     major depressive disorder    Sleep trouble     Spinal stenosis     Cervical and Lumbar    Vertebral compression fracture (HCC)      Current Outpatient Medications   Medication Sig    ondansetron (ZOFRAN ODT) 8 mg disintegrating tablet Take 1 Tab by mouth every twelve (12) hours as needed for Nausea.  lisinopril (PRINIVIL, ZESTRIL) 40 mg tablet Take 1 Tab by mouth daily.  methadone (DOLOPHINE) 10 mg tablet Take 1 Tab by mouth two (2) times a day. Max Daily Amount: 20 mg.  polyethylene glycol (MIRALAX) 17 gram packet Take 1 Packet by mouth four (4) times daily as needed.  tiZANidine (ZANAFLEX) 4 mg tablet Take 1 Tab by mouth two (2) times daily as needed.  traZODone (DESYREL) 50 mg tablet Take 1 Tab by mouth nightly as needed for Sleep.  pantoprazole (PROTONIX) 40 mg tablet Take 1 Tab by mouth Daily (before breakfast).  testosterone cypionate (DEPOTESTOTERONE CYPIONATE) 200 mg/mL injection INJECT 0.5 MILLILITERS ONCE EVERY 7 DAYS    calcium-vitamin D (OYSTER SHELL) 500 mg(1,250mg) -200 unit per tablet Take 1 Tab by mouth daily.     carvedilol (COREG) 25 mg tablet Take 1 Tab by mouth two (2) times daily (with meals).  pregabalin (LYRICA) 225 mg capsule Take 1 Cap by mouth two (2) times a day. Max Daily Amount: 450 mg.    HYDROcodone-acetaminophen (NORCO) 5-325 mg per tablet Take 1 Tab by mouth every four (4) hours as needed. Max Daily Amount: 6 Tabs.  buPROPion  mg Tb24 Take 450 mg by mouth every morning.  busPIRone (BUSPAR) 5 mg tablet Take 1 Tab by mouth two (2) times a day.  DULoxetine (CYMBALTA) 60 mg capsule Take 1 Cap by mouth daily.  LORazepam (ATIVAN) 1 mg tablet Take 1 Tab by mouth every eight (8) hours as needed for Anxiety. Max Daily Amount: 3 mg. TAPERING DOSE, DO NOT REFILL    carvedilol (COREG) 25 mg tablet TAKE ONE TABLET BY MOUTH TWICE A DAY WITH MEALS    LYRICA 225 mg capsule 225 mg two (2) times a day.  CALCIUM 600 WITH VITAMIN D3 600 mg(1,500mg) -400 unit chew     methadone (DOLOPHINE) 10 mg tablet Take 10 mg by mouth two (2) times a day.  lisinopril (PRINIVIL, ZESTRIL) 40 mg tablet Take 1 tablet by mouth daily.  polyethylene glycol (MIRALAX) 17 gram/dose powder 17 g four (4) times daily.  omeprazole (PRILOSEC) 20 mg capsule TAKE ONE CAPSULE BY MOUTH EVERY DAY    VOLTAREN 1 % gel Apply 2 g to affected area daily as needed. No current facility-administered medications for this visit. Allergies   Allergen Reactions    Amitriptyline Other (comments)     hallucinating    Neurontin [Gabapentin] Other (comments)     insomnia    Percocet [Oxycodone-Acetaminophen] Nausea Only and Anxiety     Pt states he can tolerate small doses at 10 mg a day       Review of Systems:  - Eyes: no blurry vision or double vision  - Cardiovascular: no chest pain  - Respiratory: no shortness of breath  - Musculoskeletal: See HPI.     Physical Examination:  Visit Vitals  /68 (BP 1 Location: Right arm, BP Patient Position: Sitting)   Pulse 79   Ht 6' 7\" (2.007 m)   Wt 201 lb 9.6 oz (91.4 kg)   BMI 22.71 kg/m² -   - General: pleasant, no distress, walks with a rolling walker and a limp. HEENT: hearing intact, EOMI, clear sclera without icterus  - Cardiovascular: regular, normal rate   - Respiratory: normal effort  - Integumentary: no edema  - Psychiatric: normal mood and affect    Data Reviewed:      Component      Latest Ref Rng & Units 1/24/2019 1/24/2019          10:36 AM 10:36 AM   Sodium      136 - 145 mmol/L 139    Potassium      3.5 - 5.1 mmol/L 4.3    Chloride      97 - 108 mmol/L 104    CO2      21 - 32 mmol/L 29    Anion gap      5 - 15 mmol/L 6    Glucose      65 - 100 mg/dL 108 (H)    BUN      6 - 20 MG/DL 15    Creatinine      0.70 - 1.30 MG/DL 0.95    BUN/Creatinine ratio      12 - 20   16    GFR est AA      >60 ml/min/1.73m2 >60    GFR est non-AA      >60 ml/min/1.73m2 >60    Calcium      8.5 - 10.1 MG/DL 9.4    Bilirubin, total      0.2 - 1.0 MG/DL 0.5    ALT (SGPT)      12 - 78 U/L 66    AST      15 - 37 U/L 35    Alk. phosphatase      45 - 117 U/L 93    Protein, total      6.4 - 8.2 g/dL 6.3 (L)    Albumin      3.5 - 5.0 g/dL 2.8 (L)    Globulin      2.0 - 4.0 g/dL 3.5    A-G Ratio      1.1 - 2.2   0.8 (L)    WBC      4.1 - 11.1 K/uL  10.5   RBC      4.10 - 5.70 M/uL  4.18   HGB      12.1 - 17.0 g/dL  12.6   HCT      36.6 - 50.3 %  38.9   MCV      80.0 - 99.0 FL  93.1   MCH      26.0 - 34.0 PG  30.1   MCHC      30.0 - 36.5 g/dL  32.4   RDW      11.5 - 14.5 %  14.3   PLATELET      042 - 452 K/uL  620 (H)   MPV      8.9 - 12.9 FL  9.8   NRBC      0  WBC  0.0   ABSOLUTE NRBC      0.00 - 0.01 K/uL  0.00       Assessment/Plan:   1. Low testosterone in male   Labs today on day 7 after testosterone injection. 2. Essential hypertension   Blood pressure is controlled after weight loss. Continue carvedilol   And lisinopril   3. Other chronic pain   Remains on chronic opiates. Patient Instructions   Continue  0.5 ml testosterone weekly -     Labs will reflect levels at end of dosing. Blood pressure  Continue carvedilol to 25 mg twice daily  Continue lisinopril 40 mg        Follow-up Disposition:  Return in about 6 months (around 8/8/2019).     Copy sent to:

## 2019-02-11 LAB
TESTOST FREE SERPL-MCNC: 4.5 PG/ML (ref 6.6–18.1)
TESTOST SERPL-MCNC: 423 NG/DL (ref 264–916)

## 2019-02-14 ENCOUNTER — PATIENT OUTREACH (OUTPATIENT)
Dept: INTERNAL MEDICINE CLINIC | Age: 69
End: 2019-02-14

## 2019-02-14 NOTE — PROGRESS NOTES
This writer reaches out to Mr. Raquel Lakhani as a follow up to our last conversation. Pt tells this writer he is well and there are no changes. [facility] wound care continues and he is hopeful that a care aide will be starting soon. 2/18 = f/u with Dr Bharti Jefferson        Patient Alyson Schafer (pt-stated)      Mr. Raquel Lakhani is s/p admission for Cellulitis to his left leg. He uses a walker, lives alone and has a limited support system. He has denied some services while accepting others. Mr. Raquel Lakhani is in need of knee surgery but a dental appointment is needed first. Mr. Raquel Lakhani has agreed to schedule this appointment on his own by 2/15. Reassess by 2/15 (ms).     2/14: Mr. Raquel Lakhani has not scheduled a dental appointment. He is aware of it's need before knee surgery take place. Pt reports having intention to do so today or tomorrow. Reassess by 2/28 (ms). Other     Attends follow-up appointments as directed. Mr. Raquel Lakhani has pain that requires prescription medication. In order to receive this he will need to f/u with Dr Bragg- pain medicine as a walk in pt. Pt agrees to do this, reassess by 2/15 (ms).     2/14: Mr. Raquel Lakhani has seen Dr Bragg since discharge. Next f/u is set for 2/27 per a call to that office for confirmation. Pain is present but not limiting at this time.

## 2019-02-18 ENCOUNTER — OFFICE VISIT (OUTPATIENT)
Dept: SURGERY | Age: 69
End: 2019-02-18

## 2019-02-18 VITALS
HEIGHT: 78 IN | DIASTOLIC BLOOD PRESSURE: 80 MMHG | SYSTOLIC BLOOD PRESSURE: 140 MMHG | WEIGHT: 201 LBS | BODY MASS INDEX: 23.26 KG/M2 | TEMPERATURE: 97.9 F | OXYGEN SATURATION: 97 % | HEART RATE: 85 BPM | RESPIRATION RATE: 18 BRPM

## 2019-02-18 DIAGNOSIS — L02.91 ABSCESS: Primary | ICD-10-CM

## 2019-02-18 NOTE — PROGRESS NOTES
1. Have you been to the ER, urgent care clinic since your last visit? Hospitalized since your last visit? No    2. Have you seen or consulted any other health care providers outside of the [address] [place] [address] since your last visit? Include any pap smears or colon screening.  No

## 2019-02-18 NOTE — PROGRESS NOTES
HISTORY OF PRESENT ILLNESS  Juan C Barrett is a 77 y.o. male who returns for a wound check. Mr. Rambo Márquez is s/p drainage of a left lateral thigh abscess on 1/14/2019. Doing well since last visit. He has no complaints today. Continuing to receive local wound care which is going well. Review of systems negative except as noted. Review of Systems   Constitutional: Negative for chills and fever. Gastrointestinal: Negative for nausea and vomiting. Musculoskeletal:        Denies pain at site of open wound. Physical Exam   Constitutional: He appears well-developed and well-nourished. No distress. Cardiovascular: Normal rate and regular rhythm. Pulmonary/Chest: Effort normal and breath sounds normal.   Abdominal: Soft. He exhibits no distension. There is no tenderness. Musculoskeletal: Normal range of motion. Skin:   The left lateral thigh wound is clean and granulating. No induration, cellulitis or drainage is noted. Vitals reviewed. ASSESSMENT and PLAN  Wound redressed with gel, 4x4's and tape. Reassured Mr. Rambo Márquez that he is doing well and that the wound is healing nicely. Asked him to continue local wound care as before. Also told him that he can get open wound wet. Will see in four more weeks or earlier if need be. Follow up with Dr. Alexandria Vazquez as scheduled.      CC: Sujata Garza MD

## 2019-02-19 ENCOUNTER — TELEPHONE (OUTPATIENT)
Dept: INTERNAL MEDICINE CLINIC | Age: 69
End: 2019-02-19

## 2019-02-19 ENCOUNTER — TELEPHONE (OUTPATIENT)
Dept: ENDOCRINOLOGY | Age: 69
End: 2019-02-19

## 2019-02-19 NOTE — TELEPHONE ENCOUNTER
----- Message from Hannah Guajardo sent at 2/19/2019  8:57 AM EST -----  Regarding: Dr. Bruce Mckay  Pt requesting to continue using the \"Giclofenac Sodium 1% gel\". Stated he wouldn't be able to walk without it. Best contact [phone].

## 2019-02-19 NOTE — TELEPHONE ENCOUNTER
Billy Reardon with [facility] called stating  Pt is on trazadone for sleep but his legs are getting weaker so he is not taking it and he alsmost fell yesterday. He wants something else please.  Pt # [phone]  Omaira;s # [phone]

## 2019-02-20 NOTE — TELEPHONE ENCOUNTER
This is fine. I believe medication is diclofenac. I must have inadvertently/mistakenly  removed it from his medications list.

## 2019-02-26 ENCOUNTER — PATIENT OUTREACH (OUTPATIENT)
Dept: INTERNAL MEDICINE CLINIC | Age: 69
End: 2019-02-26

## 2019-02-26 NOTE — PROGRESS NOTES
This writer reaches out to Mr Rambo Márquez as a final follow up to our last conversation. Today's calls go unanswered, lmom x 2 with contact number and reason for the call. This writer will call back in 1-2 business days.

## 2019-02-27 ENCOUNTER — TELEPHONE (OUTPATIENT)
Dept: SURGERY | Age: 69
End: 2019-02-27

## 2019-02-27 NOTE — TELEPHONE ENCOUNTER
Enrike Veronica wanted to make sure Dr. Mamta Mandujano was aware of the wound care schedule change due to the patient canceling today because of several other appointments scheduled for today. I told Enrike Veronica I would pass the message on to Dr. Mamta Mandujano.

## 2019-02-27 NOTE — TELEPHONE ENCOUNTER
Elke Garcia from [facility] called this patient to set up an appt for today for wound care. Elke Garcia stated this patient refused wound care for today because the patient had two medical appts to attend to today. Elke Garcia wanted to verify that it would be okay to do a wound change on Thursday and then again on Sunday so Mr. David Llanos will be on his regular schedule again.

## 2019-03-01 ENCOUNTER — PATIENT OUTREACH (OUTPATIENT)
Dept: INTERNAL MEDICINE CLINIC | Age: 69
End: 2019-03-01

## 2019-03-01 NOTE — PROGRESS NOTES
Patient has graduated from the Transitions of Care Coordination  program on 3/1/19. Patient's symptoms are stable at this time. Patient/family has the ability to self-manage. Care management goals have been completed at this time. No further nurse navigator follow up scheduled. Pt ask that all medications be prescribed by Dr Taylor Grider. This writer advises will pass request along to her, that she normally defers any med's prescribed to a specialist to that provider. Pt states his main need at this time is a sleep aide. He has stopped the Trazadone d/t it's effects on his legs resulting in falls. He has tried Melatonin in the past and it wasn't successful. Goals Addressed                 This Visit's Progress       Patient Stated     COMPLETED: Dental Apt (pt-stated)        Mr. Woodson Leyden is s/p admission for Cellulitis to his left leg. He uses a walker, lives alone and has a limited support system. He has denied some services while accepting others. Mr. Woodson Leyden is in need of knee surgery but a dental appointment is needed first. Mr. Woodson Leyden has agreed to schedule this appointment on his own by 2/15. Reassess by 2/15 (ms).     2/14: Mr. Woodson Leyden has not scheduled a dental appointment. He is aware of it's need before knee surgery take place. Pt reports having intention to do so today or tomorrow. Reassess by 2/28 (ms).     3/1: Pt reports having made a dental appointment, it will occur the week of 3/4/19         Other     COMPLETED: Attends follow-up appointments as directed. Mr. Woodson Leyden has pain that requires prescription medication. In order to receive this he will need to f/u with Dr Adeel Corea- pain medicine as a walk in pt. Pt agrees to do this, reassess by 2/15 (ms).     2/14: Mr. Woodson Leyden has seen Dr Adeel Corea since discharge. Next f/u is set for 2/27 per a call to that office for confirmation. Pain is present but not limiting at this time.             Pt has nurse navigator's contact information for any further questions, concerns, or needs.   Patients upcoming visits:    Future Appointments   Date Time Provider Jean-Paul Lisa   3/18/2019  2:20 PM Sathya Fernández MD [facility]   4/1/2019  9:00 AM Saw Thapa MD [facility]   5/6/2019  2:00 PM Lenore Toribio  [address]   8/12/2019  1:30 PM Tavo Edge MD [address] [address]

## 2019-03-01 NOTE — Clinical Note
Mr. Mere Anderson would like to ask that all medications be prescribed by you. I told him that you normally defers any med's prescribed by a specialist to that provider. Pt states his main need at this time is a sleep aide. He has stopped the Trazadone d/t it's effects on his legs resulting in falls. He has tried Melatonin in the past and it wasn't successful.

## 2019-03-05 ENCOUNTER — TELEPHONE (OUTPATIENT)
Dept: SURGERY | Age: 69
End: 2019-03-05

## 2019-03-05 NOTE — TELEPHONE ENCOUNTER
Spoke with snoy Purvis nurse with [facility] informed her to proceed with new days due to patient appointment. She agreed she will call if any other questions or concerns.

## 2019-03-05 NOTE — TELEPHONE ENCOUNTER
Minna Thomas called about pt stated he couldn't be seen today that he had a dentist appt today so she wanted to know if she just could see him Wed and sat then resume regular schedule on Monday of next week  Every other day

## 2019-03-20 ENCOUNTER — TELEPHONE (OUTPATIENT)
Dept: SURGERY | Age: 69
End: 2019-03-20

## 2019-03-20 NOTE — TELEPHONE ENCOUNTER
Forms reviewed signed and faxed back to [facility] sent home health confirmation received.  Forms placed in grace garvin

## 2019-03-20 NOTE — TELEPHONE ENCOUNTER
Received call from [facility] w/[facility] who will be refaxing a plan of care order and face to face attestation. She wanted us to be on the look out for it.

## 2019-04-25 RX ORDER — TIZANIDINE 4 MG/1
4 TABLET ORAL
Qty: 20 TAB | Refills: 0 | Status: SHIPPED | OUTPATIENT
Start: 2019-04-25 | End: 2019-05-23 | Stop reason: SDUPTHER

## 2019-05-06 ENCOUNTER — OFFICE VISIT (OUTPATIENT)
Dept: SURGERY | Age: 69
End: 2019-05-06

## 2019-05-06 VITALS
RESPIRATION RATE: 18 BRPM | HEIGHT: 78 IN | TEMPERATURE: 98 F | BODY MASS INDEX: 23.26 KG/M2 | HEART RATE: 82 BPM | WEIGHT: 201 LBS | SYSTOLIC BLOOD PRESSURE: 122 MMHG | OXYGEN SATURATION: 94 % | DIASTOLIC BLOOD PRESSURE: 76 MMHG

## 2019-05-06 DIAGNOSIS — L02.91 ABSCESS: Primary | ICD-10-CM

## 2019-05-06 NOTE — PROGRESS NOTES
1. Have you been to the ER, urgent care clinic since your last visit? Hospitalized since your last visit? No    2. Have you seen or consulted any other health care providers outside of the [address] [place] [address] since your last visit? Include any pap smears or colon screening.  No

## 2019-05-06 NOTE — PROGRESS NOTES
HISTORY OF PRESENT ILLNESS  Radha Gates is a 77 y.o. male who returns for a wound check. Mr. Hayley Felton is s/p drainage of a left, lateral thigh abscess on 1/14/2019. He has been doing well since his last visit. He reports right thigh pain. Denies pain at surgical site. He has been keeping a dry dressing over the wound. Past Medical History:  1/14/2019: Abscess  No date: Anemia NEC  No date: Asthma  No date: Chronic pain      Comment:  Due to spinal stenosis  No date: Constipation  No date: HTN (hypertension)  No date: Hypertension  No date: Hypogonadism male  No date: Other ill-defined conditions(799.89)      Comment:  spinal stenosis  No date: Psychiatric disorder      Comment:  major depressive disorder  No date: Sleep trouble  No date: Spinal stenosis      Comment:  Cervical and Lumbar  No date: Vertebral compression fracture (HCC)    Past Surgical History:  No date: HX BACK SURGERY  No date: HX CATARACT REMOVAL; Bilateral  No date: HX CERVICAL LAMINECTOMY  01/14/2019: HX CYST INCISION AND DRAINAGE      Comment:  Drain left lateral thigh abscess  No date: HX TONSILLECTOMY    Review of patient's family history indicates:  Problem: Heart Disease      Relation: Father          Age of Onset: (Not Specified)  Problem: Hypertension      Relation: Father          Age of Onset: (Not Specified)  Problem: Stroke      Relation: Father          Age of Onset: (Not Specified)    Social History    Socioeconomic History      Marital status:       Spouse name: Not on file      Number of children: Not on file      Years of education: Not on file      Highest education level: Not on file    Tobacco Use      Smoking status: Never Smoker      Smokeless tobacco: Never Used    Substance and Sexual Activity      Alcohol use: No        Comment: Drank six weeks ago. Drug use: No      Sexual activity: Never    Review of systems negative except as noted.       Review of Systems   Constitutional: Negative for chills and fever.   Musculoskeletal:        Right thigh pain. Denies left thigh pain. Physical Exam   Constitutional: He appears well-developed and well-nourished. No distress. HENT:   Head: Normocephalic and atraumatic. Cardiovascular: Normal rate and regular rhythm. Pulmonary/Chest: Effort normal and breath sounds normal.   Abdominal: Soft. He exhibits no distension. There is no tenderness. Musculoskeletal: Normal range of motion. Neurological: He is alert. Skin:   Left lateral thigh wound is clean and well healed with the exception of an area of granulation tissue. No surrounding induration or cellulitis. Vitals reviewed. ASSESSMENT and PLAN  Granulation tissue cauterized and dry dressing applied. Reassured Mr. Gopi Rivers that he is doing well and that the wound has healed nicely. Told him that he can remove dressing tomorrow. No need for further wound care. In terms of right thigh pain, follow up with Orthopedic Surgery as scheduled. Follow up with Dr. Mercy Mcelroy as scheduled. Will see as needed. He is agreeable to this plan and is most certainly free to contact the office should any questions or concerns arise.      CC: Anya Rodriguez MD

## 2019-05-23 RX ORDER — TIZANIDINE 4 MG/1
TABLET ORAL
Qty: 20 TAB | Refills: 0 | Status: SHIPPED | OUTPATIENT
Start: 2019-05-23 | End: 2019-06-24 | Stop reason: SDUPTHER

## 2019-06-25 RX ORDER — TIZANIDINE 4 MG/1
TABLET ORAL
Qty: 20 TAB | Refills: 0 | Status: SHIPPED | OUTPATIENT
Start: 2019-06-25 | End: 2019-08-26 | Stop reason: SDUPTHER

## 2019-07-26 RX ORDER — CARVEDILOL 25 MG/1
25 TABLET ORAL 2 TIMES DAILY WITH MEALS
Qty: 180 TAB | Refills: 3 | Status: SHIPPED | OUTPATIENT
Start: 2019-07-26

## 2019-08-12 ENCOUNTER — OFFICE VISIT (OUTPATIENT)
Dept: ENDOCRINOLOGY | Age: 69
End: 2019-08-12

## 2019-08-12 VITALS
DIASTOLIC BLOOD PRESSURE: 76 MMHG | HEART RATE: 62 BPM | WEIGHT: 202.6 LBS | SYSTOLIC BLOOD PRESSURE: 122 MMHG | BODY MASS INDEX: 23.44 KG/M2 | HEIGHT: 78 IN

## 2019-08-12 DIAGNOSIS — R79.89 LOW TESTOSTERONE IN MALE: Primary | ICD-10-CM

## 2019-08-12 DIAGNOSIS — F41.9 ANXIETY: ICD-10-CM

## 2019-08-12 DIAGNOSIS — R63.4 WEIGHT LOSS: ICD-10-CM

## 2019-08-12 DIAGNOSIS — I10 ESSENTIAL HYPERTENSION: ICD-10-CM

## 2019-08-12 RX ORDER — LIDOCAINE 50 MG/G
PATCH TOPICAL EVERY 24 HOURS
COMMUNITY
End: 2019-10-09

## 2019-08-12 RX ORDER — TESTOSTERONE CYPIONATE 200 MG/ML
INJECTION INTRAMUSCULAR
Qty: 10 ML | Refills: 1 | Status: SHIPPED | OUTPATIENT
Start: 2019-08-12 | End: 2020-01-08 | Stop reason: SDUPTHER

## 2019-08-12 RX ORDER — DICLOFENAC SODIUM 10 MG/G
GEL TOPICAL
COMMUNITY
Start: 2017-10-26

## 2019-08-12 RX ORDER — TESTOSTERONE CYPIONATE 200 MG/ML
INJECTION INTRAMUSCULAR
Qty: 2 ML | Refills: 5 | Status: SHIPPED | OUTPATIENT
Start: 2019-08-12 | End: 2019-08-12 | Stop reason: SDUPTHER

## 2019-08-12 NOTE — PROGRESS NOTES
History of Present Illness: Anna Pillai is a 77 y.o. male presents for follow-up of low testosterone. He also has spinal stenosis and chronic pain. Has been taking methadone for this for years. Currently he is having continued problems with spinal stenosis and osteoarthritis in his knees. Hypogonadism: testosterone cypionate 200 mg/ml -0.5 ml (100 mg) every 7 days on Fridays. Missed Friday's dose, so he has been about 10 days since last injection. HTN -carvedilol 25 mg BID and lisinopril 40 mg. Blood pressure improved. Weight is down 20 lbs due to lack of appetite in January 2019. Weight has been stable since    He is seeing a new psychiatrist and pain management doctor. His lorazepam has been tapered more rapidly and he would like. He has been having difficulties with increased anxiety. He feels this is not managed adequately    Past Medical History:   Diagnosis Date    Abscess 1/14/2019    Anemia NEC     Asthma     Chronic pain     Due to spinal stenosis    Constipation     HTN (hypertension)     Hypertension     Hypogonadism male     Other ill-defined conditions(649.23)     spinal stenosis    Psychiatric disorder     major depressive disorder    Sleep trouble     Spinal stenosis     Cervical and Lumbar    Vertebral compression fracture (HCC)      Current Outpatient Medications   Medication Sig    diclofenac (VOLTAREN) 1 % gel APPLY 4 GRAMS TOPICALLY TO KNEES THREE TIMES A DAY    lidocaine (LIDODERM) 5 % by TransDERmal route every twenty-four (24) hours. Apply patch to the affected area for 12 hours a day and remove for 12 hours a day.  carvedilol (COREG) 25 mg tablet Take 1 Tab by mouth two (2) times daily (with meals).     Syringe with Needle, Disp, (BD LUER-HANG SYRINGE) 3 mL 21 gauge x 1\" syrg Use as directed every 7 days    tiZANidine (ZANAFLEX) 4 mg tablet TAKE ONE TABLET BY MOUTH TWICE A DAY AS NEEDED FOR PAIN    ondansetron (ZOFRAN ODT) 8 mg disintegrating tablet Take 1 Tab by mouth every twelve (12) hours as needed for Nausea.  lisinopril (PRINIVIL, ZESTRIL) 40 mg tablet Take 1 Tab by mouth daily.  methadone (DOLOPHINE) 10 mg tablet Take 1 Tab by mouth two (2) times a day. Max Daily Amount: 20 mg.  polyethylene glycol (MIRALAX) 17 gram packet Take 1 Packet by mouth four (4) times daily as needed.  testosterone cypionate (DEPOTESTOTERONE CYPIONATE) 200 mg/mL injection INJECT 0.5 MILLILITERS ONCE EVERY 7 DAYS    calcium-vitamin D (OYSTER SHELL) 500 mg(1,250mg) -200 unit per tablet Take 1 Tab by mouth daily.  pregabalin (LYRICA) 225 mg capsule Take 1 Cap by mouth two (2) times a day. Max Daily Amount: 450 mg.    HYDROcodone-acetaminophen (NORCO) 5-325 mg per tablet Take 1 Tab by mouth every four (4) hours as needed. Max Daily Amount: 6 Tabs.  buPROPion  mg Tb24 Take 450 mg by mouth every morning.  DULoxetine (CYMBALTA) 60 mg capsule Take 1 Cap by mouth daily.  LORazepam (ATIVAN) 1 mg tablet Take 1 Tab by mouth every eight (8) hours as needed for Anxiety. Max Daily Amount: 3 mg. TAPERING DOSE, DO NOT REFILL    LYRICA 225 mg capsule 225 mg two (2) times a day.  CALCIUM 600 WITH VITAMIN D3 600 mg(1,500mg) -400 unit chew     methadone (DOLOPHINE) 10 mg tablet Take 10 mg by mouth two (2) times a day.  lisinopril (PRINIVIL, ZESTRIL) 40 mg tablet Take 1 tablet by mouth daily.  polyethylene glycol (MIRALAX) 17 gram/dose powder 17 g four (4) times daily.  omeprazole (PRILOSEC) 20 mg capsule TAKE ONE CAPSULE BY MOUTH EVERY DAY    traZODone (DESYREL) 50 mg tablet Take 1 Tab by mouth nightly as needed for Sleep.  pantoprazole (PROTONIX) 40 mg tablet Take 1 Tab by mouth Daily (before breakfast).  busPIRone (BUSPAR) 5 mg tablet Take 1 Tab by mouth two (2) times a day. No current facility-administered medications for this visit.       Allergies   Allergen Reactions    Amitriptyline Other (comments)     hallucinating    Neurontin [Gabapentin] Other (comments)     insomnia    Percocet [Oxycodone-Acetaminophen] Nausea Only and Anxiety     Pt states he can tolerate small doses at 10 mg a day       Review of Systems:  - Eyes: no blurry vision or double vision  - Cardiovascular: no chest pain  - Respiratory: no shortness of breath  - Musculoskeletal: Stable back pain and weakness in legs  - Neurological: Stable, chronic low back pain    Physical Examination:  Visit Vitals  /76 (BP 1 Location: Left arm, BP Patient Position: Sitting)   Pulse 62   Ht 6' 7\" (2.007 m)   Wt 202 lb 9.6 oz (91.9 kg)   BMI 22.82 kg/m²   -   - General: pleasant, no distress, uses cane. Stooped posture   HEENT: hearing intact, EOMI, clear sclera without icterus  - Cardiovascular: regular, normal rate   - Respiratory: normal effort  - Integumentary: no edema  - Psychiatric: normal mood and affect    Data Reviewed:   No results found for: HBA1C, KUJ7OGNS, WXM1CVOX   Lab Results   Component Value Date/Time    Sodium 139 01/24/2019 10:36 AM    Potassium 4.3 01/24/2019 10:36 AM    Creatinine 0.95 01/24/2019 10:36 AM        No results found for: CHOL, HDL, LDLC, LDL, LDLCEXT, TRIGL   No results found for: TSH, FT4, TRALT, TMCLT, TSHEXT     Assessment/Plan:   1. Low testosterone in male   Continue 100 mg weekly. 2. Essential hypertension   Continue carvedilol and lisinopril. Well-controlled   3. Weight loss   Screen for hyperthyroidism with TSH   4. Anxiety    screen for hyperthyroidism     Patient Instructions   Continue  0.5 ml testosterone weekly -          Blood pressure  Continue carvedilol to 25 mg twice daily  Continue lisinopril 40 mg    Anxiety and weight loss  We will check your thyroid labels. Follow-up and Dispositions    · Return in about 6 months (around 2/12/2020).            Copy sent to:

## 2019-08-12 NOTE — PATIENT INSTRUCTIONS
Continue  0.5 ml testosterone weekly -          Blood pressure  Continue carvedilol to 25 mg twice daily  Continue lisinopril 40 mg    Anxiety and weight loss  We will check your thyroid labels.

## 2019-08-13 LAB
T4 FREE SERPL-MCNC: 1.2 NG/DL (ref 0.82–1.77)
TSH SERPL DL<=0.005 MIU/L-ACNC: 1.76 UIU/ML (ref 0.45–4.5)

## 2019-10-09 ENCOUNTER — OFFICE VISIT (OUTPATIENT)
Dept: INTERNAL MEDICINE CLINIC | Age: 69
End: 2019-10-09

## 2019-10-09 VITALS
WEIGHT: 204 LBS | HEART RATE: 71 BPM | BODY MASS INDEX: 23.6 KG/M2 | HEIGHT: 78 IN | OXYGEN SATURATION: 91 % | RESPIRATION RATE: 19 BRPM | SYSTOLIC BLOOD PRESSURE: 81 MMHG | TEMPERATURE: 98.2 F | DIASTOLIC BLOOD PRESSURE: 42 MMHG

## 2019-10-09 DIAGNOSIS — Z00.00 MEDICARE ANNUAL WELLNESS VISIT, SUBSEQUENT: Primary | ICD-10-CM

## 2019-10-09 DIAGNOSIS — M48.02 SPINAL STENOSIS OF CERVICAL REGION: ICD-10-CM

## 2019-10-09 DIAGNOSIS — K08.9 POOR DENTITION: ICD-10-CM

## 2019-10-09 DIAGNOSIS — Z86.2 HISTORY OF ANEMIA: ICD-10-CM

## 2019-10-09 DIAGNOSIS — I95.9 HYPOTENSION, UNSPECIFIED HYPOTENSION TYPE: ICD-10-CM

## 2019-10-09 DIAGNOSIS — F11.90 METHADONE USE: ICD-10-CM

## 2019-10-09 DIAGNOSIS — F11.20 CHRONIC NARCOTIC DEPENDENCE (HCC): ICD-10-CM

## 2019-10-09 DIAGNOSIS — Z79.899 CHRONIC PRESCRIPTION BENZODIAZEPINE USE: ICD-10-CM

## 2019-10-09 DIAGNOSIS — Z23 ENCOUNTER FOR IMMUNIZATION: ICD-10-CM

## 2019-10-09 RX ORDER — ONDANSETRON 8 MG/1
8 TABLET, ORALLY DISINTEGRATING ORAL
Qty: 30 TAB | Refills: 2 | Status: SHIPPED | OUTPATIENT
Start: 2019-10-09 | End: 2020-12-30

## 2019-10-09 RX ORDER — TIZANIDINE 4 MG/1
TABLET ORAL
Qty: 60 TAB | Refills: 1 | Status: SHIPPED | OUTPATIENT
Start: 2019-10-09 | End: 2020-01-23 | Stop reason: SDUPTHER

## 2019-10-09 NOTE — ANESTHESIA POSTPROCEDURE EVALUATION
Procedure(s):  INCISION AND DRAINAGE LEFT THIGH ABSCESS.     Anesthesia Post Evaluation        Patient location during evaluation: PACU  Patient participation: complete - patient participated  Level of consciousness: awake and alert  Pain management: adequate  Airway patency: patent  Anesthetic complications: no  Cardiovascular status: acceptable  Respiratory status: acceptable  Hydration status: acceptable  Post anesthesia nausea and vomiting:  none      Visit Vitals  /69 (BP 1 Location: Left arm, BP Patient Position: At rest)   Pulse 96   Temp 36.9 °C (98.4 °F)   Resp 15   Ht 6' 7\" (2.007 m)   Wt 99.8 kg (220 lb)   SpO2 92%   BMI 24.78 kg/m² yes

## 2019-10-09 NOTE — PROGRESS NOTES
Chief Complaint   Patient presents with   Shereen Hand Annual Wellness Visit     1. Have you been to the ER, urgent care clinic since your last visit? Hospitalized since your last visit? No    2. Have you seen or consulted any other health care providers outside of the [address] [place] [address] since your last visit? Include any pap smears or colon screening. No     After obtaining consent, and per orders of Dr. Maria C Wheeler, injection of High Dose Insulin given by Toyin Guajardo LPN. Patient instructed to remain in clinic for 15 minutes afterwards, and to report any adverse reaction to me immediately.

## 2019-10-09 NOTE — PROGRESS NOTES
Jackei Wilson is a 77 y.o. male and presents with Annual Wellness Visit  . This is the Subsequent Medicare Annual Wellness Exam, performed 12 months or more after the Initial AWV or the last Subsequent AWV    I have reviewed the patient's medical history in detail and updated the computerized patient record. History     Past Medical History:   Diagnosis Date    Abscess 1/14/2019    Anemia NEC     Asthma     Chronic pain     Due to spinal stenosis    Constipation     HTN (hypertension)     Hypertension     Hypogonadism male     Other ill-defined conditions(799.89)     spinal stenosis    Psychiatric disorder     major depressive disorder    Sleep trouble     Spinal stenosis     Cervical and Lumbar    Vertebral compression fracture (HCC)       Past Surgical History:   Procedure Laterality Date    HX BACK SURGERY      HX CATARACT REMOVAL Bilateral     HX CERVICAL LAMINECTOMY      HX CYST INCISION AND DRAINAGE  01/14/2019    Drain left lateral thigh abscess    HX TONSILLECTOMY       Current Outpatient Medications   Medication Sig Dispense Refill    tiZANidine (ZANAFLEX) 4 mg tablet TAKE ONE TABLET BY MOUTH TWICE A DAY AS NEEDED FOR PAIN 60 Tab 1    ondansetron (ZOFRAN ODT) 8 mg disintegrating tablet Take 1 Tab by mouth every twelve (12) hours as needed for Nausea. 30 Tab 2    varicella-zoster recombinant, PF, (SHINGRIX) 50 mcg/0.5 mL susr injection 0.5 mL by IntraMUSCular route once for 1 dose. 0.5 mL 1    Syringe with Needle, Disp, (BD LUER-HANG SYRINGE) 3 mL 21 gauge x 1\" syrg Use as directed every 7 days 12 Syringe 3    diclofenac (VOLTAREN) 1 % gel APPLY 4 GRAMS TOPICALLY TO KNEES THREE TIMES A DAY      testosterone cypionate (DEPOTESTOTERONE CYPIONATE) 200 mg/mL injection INJECT 0.5 MILLILITERS ONCE EVERY 7 DAYS 10 mL 1    carvedilol (COREG) 25 mg tablet Take 1 Tab by mouth two (2) times daily (with meals).  180 Tab 3    methadone (DOLOPHINE) 10 mg tablet Take 1 Tab by mouth two (2) times a day. Max Daily Amount: 20 mg. 20 Tab 0    calcium-vitamin D (OYSTER SHELL) 500 mg(1,250mg) -200 unit per tablet Take 1 Tab by mouth daily. 20 Tab 0    pregabalin (LYRICA) 225 mg capsule Take 1 Cap by mouth two (2) times a day. Max Daily Amount: 450 mg. 20 Cap 0    HYDROcodone-acetaminophen (NORCO) 5-325 mg per tablet Take 1 Tab by mouth every four (4) hours as needed. Max Daily Amount: 6 Tabs. 20 Tab 0    buPROPion  mg Tb24 Take 450 mg by mouth every morning. 14 Tab 1    DULoxetine (CYMBALTA) 60 mg capsule Take 1 Cap by mouth daily. 14 Cap 1    LORazepam (ATIVAN) 1 mg tablet Take 1 Tab by mouth every eight (8) hours as needed for Anxiety. Max Daily Amount: 3 mg. TAPERING DOSE, DO NOT REFILL 21 Tab 0    CALCIUM 600 WITH VITAMIN D3 600 mg(1,500mg) -400 unit chew       lisinopril (PRINIVIL, ZESTRIL) 40 mg tablet Take 1 tablet by mouth daily. 30 tablet 10     Allergies   Allergen Reactions    Amitriptyline Other (comments)     hallucinating    Neurontin [Gabapentin] Other (comments)     insomnia    Percocet [Oxycodone-Acetaminophen] Nausea Only and Anxiety     Pt states he can tolerate small doses at 10 mg a day     Family History   Problem Relation Age of Onset    Heart Disease Father     Hypertension Father     Stroke Father      Social History     Tobacco Use    Smoking status: Never Smoker    Smokeless tobacco: Never Used   Substance Use Topics    Alcohol use: No     Comment: Drank six weeks ago.      Patient Active Problem List   Diagnosis Code    Pneumonia J18.9    SIRS (systemic inflammatory response syndrome) ([facility]) R65.10    Altered mental status R41.82    Serotonin syndrome G25.79    Rhabdomyolysis M62.82    Hypokalemia E87.6    Anemia NEC     Asthma J45.909    Constipation K59.00    Sleep trouble G47.9    Essential hypertension I10    Chronic pain G89.29    Major depression F32.9    Fibromyalgia M79.7    Gastroesophageal reflux disease without esophagitis K21.9    Insomnia G47.00    Spinal stenosis M48.00    Testosterone deficiency E34.9    VANESSA (acute kidney injury) ([facility] [address] N17.9    Hyponatremia E87.1    Cellulitis L03.90    Abscess L02.91    Primary osteoarthritis of both knees M17.0    Poor dentition K08.9    Anxiety disorder F41.9    Methadone use (HCC) 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 Functional Ability and Level of Safety:   Hearing Loss  The patient needs further evaluation. Activities of Daily Living  The home contains: grab bars and shower chair  Patient needs help with:  transportation    Fall Risk  Fall Risk Assessment, last 12 mths 10/9/2019   Able to walk? Yes   Fall in past 12 months? No   Fall with injury? -   Number of falls in past 12 months -   Fall Risk Score -       Abuse Screen  Patient is not abused    Cognitive Screening   Evaluation of Cognitive Function:  Has your family/caregiver stated any concerns about your memory: no  Normal    Patient Care Team   Patient Care Team:  Jc Maharaj MD as PCP - General (Internal Medicine)  Maria C Copeland (Pain Management)  Vonnie Grey MD as Consulting Provider (Endocrinology)  Cora Abdul MD (Gastroenterology)  Alan Camilo MD as Surgeon (General Surgery)    Assessment/Plan   Education and counseling provided:  Are appropriate based on today's review and evaluation  Influenza Vaccine  Shingles rx given to pt    Diagnoses and all orders for this visit:    1. Medicare annual wellness visit, subsequent    2. Hypotension, unspecified hypotension type    3.  History of anemia  - CBC WITH AUTOMATED DIFF  -     FERRITIN    4. Spinal stenosis of cervical region  -     AMB SUPPLY ORDER    5. Methadone use ([facility] [address]    6. Chronic narcotic dependence ([facility] [address]    7. Chronic prescription benzodiazepine use    8. Poor dentition    9. Encounter for immunization  -     INFLUENZA VACCINE INACTIVATED (IIV), SUBUNIT, ADJUVANTED, IM    Other orders  -     tiZANidine (ZANAFLEX) 4 mg tablet; TAKE ONE TABLET BY MOUTH TWICE A DAY AS NEEDED FOR PAIN  -     ondansetron (ZOFRAN ODT) 8 mg disintegrating tablet; Take 1 Tab by mouth every twelve (12) hours as needed for Nausea. -     varicella-zoster recombinant, PF, (SHINGRIX) 50 mcg/0.5 mL susr injection; 0.5 mL by IntraMUSCular route once for 1 dose. Health Maintenance Due   Topic Date Due    Hepatitis C Screening  1950    DTaP/Tdap/Td series (1 - Tdap) 01/08/1972    Cologuard Q 3 Years  01/08/2001    Shingrix Vaccine Age 50> (1 of 2) 01/08/2001    GLAUCOMA SCREENING Q2Y  01/08/2016    Pneumococcal 65+ years (1 of 2 - PCV13) 01/08/2016    MEDICARE YEARLY EXAM  03/14/2018    Influenza Age 9 to Adult  08/01/2019       Subjective:    Pt request to do his lab work here  Pt request a refill of zofran and zanaflex. Pts bp is low today. Pt relays he has never been told his bp was low and he feels fine. Pt is currently on narcotic analgesics through pain mngmnt, Dr. Jeannie Rodriguez. He is on a cocktail of methadone and norco. He also takes lorazepam through his mental health provider. Pt has to have a cbc done as per his orthopedist. His last cbc showed anemia and he has to have his test repeated prior to his surgery. They would like him to have it done @ [facility], but he prefers to have it done here, today. His orthopedist is Dr. Rodri García. Pt has had several teeth pulled and is cleared to have his spine surgery. Pt relays he did not do the cologuard test bc he never received the package.       PMH-HTN   Spinal stenosis/OA/neuropathy s/p cervical surgery. Pain Mngmnt Dr. Silviano Yen   Testosterone def   Major depression/anxiety-appt scheduled already Feb 18th   GERD    PSH-c spine   T&A          Review of Systems  Constitutional: negative for fevers, chills, anorexia and weight loss  Eyes:   negative for visual disturbance and irritation  ENT:   negative for tinnitus,sore throat,nasal congestion,ear pains. hoarseness  Respiratory:  negative for cough, hemoptysis, dyspnea,wheezing  CV:   negative for chest pain, palpitations, lower extremity edema  GI:   positive for GERD  Musculoskel: positive for  joint pain  Neurological:  negative for headaches, dizziness, vertigo, memory problems and gait   Behavl/Psych: negative for feelings of anxiety, depression, mood changes    Past Medical History:   Diagnosis Date    Abscess 1/14/2019    Anemia NEC     Asthma     Chronic pain     Due to spinal stenosis    Constipation     HTN (hypertension)     Hypertension     Hypogonadism male     Other ill-defined conditions(799.89)     spinal stenosis    Psychiatric disorder     major depressive disorder    Sleep trouble     Spinal stenosis     Cervical and Lumbar    Vertebral compression fracture (HCC)      Past Surgical History:   Procedure Laterality Date    HX BACK SURGERY      HX CATARACT REMOVAL Bilateral     HX CERVICAL LAMINECTOMY      HX CYST INCISION AND DRAINAGE  01/14/2019    Drain left lateral thigh abscess    HX TONSILLECTOMY         Family History   Problem Relation Age of Onset    Heart Disease Father     Hypertension Father     Stroke Father      Current Outpatient Medications   Medication Sig Dispense Refill    tiZANidine (ZANAFLEX) 4 mg tablet TAKE ONE TABLET BY MOUTH TWICE A DAY AS NEEDED FOR PAIN 60 Tab 1    ondansetron (ZOFRAN ODT) 8 mg disintegrating tablet Take 1 Tab by mouth every twelve (12) hours as needed for Nausea.  30 Tab 2    varicella-zoster recombinant, PF, (SHINGRIX) 50 mcg/0.5 mL susr injection 0.5 mL by IntraMUSCular route once for 1 dose. 0.5 mL 1    Syringe with Needle, Disp, (BD LUER-HANG SYRINGE) 3 mL 21 gauge x 1\" syrg Use as directed every 7 days 12 Syringe 3    diclofenac (VOLTAREN) 1 % gel APPLY 4 GRAMS TOPICALLY TO KNEES THREE TIMES A DAY      testosterone cypionate (DEPOTESTOTERONE CYPIONATE) 200 mg/mL injection INJECT 0.5 MILLILITERS ONCE EVERY 7 DAYS 10 mL 1    carvedilol (COREG) 25 mg tablet Take 1 Tab by mouth two (2) times daily (with meals). 180 Tab 3    methadone (DOLOPHINE) 10 mg tablet Take 1 Tab by mouth two (2) times a day. Max Daily Amount: 20 mg. 20 Tab 0    calcium-vitamin D (OYSTER SHELL) 500 mg(1,250mg) -200 unit per tablet Take 1 Tab by mouth daily. 20 Tab 0    pregabalin (LYRICA) 225 mg capsule Take 1 Cap by mouth two (2) times a day. Max Daily Amount: 450 mg. 20 Cap 0    HYDROcodone-acetaminophen (NORCO) 5-325 mg per tablet Take 1 Tab by mouth every four (4) hours as needed. Max Daily Amount: 6 Tabs. 20 Tab 0    buPROPion  mg Tb24 Take 450 mg by mouth every morning. 14 Tab 1    DULoxetine (CYMBALTA) 60 mg capsule Take 1 Cap by mouth daily. 14 Cap 1    LORazepam (ATIVAN) 1 mg tablet Take 1 Tab by mouth every eight (8) hours as needed for Anxiety. Max Daily Amount: 3 mg. TAPERING DOSE, DO NOT REFILL 21 Tab 0    CALCIUM 600 WITH VITAMIN D3 600 mg(1,500mg) -400 unit chew       lisinopril (PRINIVIL, ZESTRIL) 40 mg tablet Take 1 tablet by mouth daily.  30 tablet 10     Allergies   Allergen Reactions    Amitriptyline Other (comments)     hallucinating    Neurontin [Gabapentin] Other (comments)     insomnia    Percocet [Oxycodone-Acetaminophen] Nausea Only and Anxiety     Pt states he can tolerate small doses at 10 mg a day       Objective:  Visit Vitals  BP (!) 81/42 (BP 1 Location: Left arm, BP Patient Position: Sitting)   Pulse 71   Temp 98.2 °F (36.8 °C) (Oral)   Resp 19   Ht 6' 7\" (2.007 m)   Wt 204 lb (92.5 kg)   SpO2 91%   BMI 22.98 kg/m²     Physical Exam:   General appearance - alert, elderly man. Mental status - alert, oriented to person, place, and time  EYE-EOMI  Mouth - mucous membranes moist, pharynx normal without lesions POOR dentition and partially edentulous  Neck - supple, no significant adenopathy   Chest - clear to auscultation, no wheezes, rales or rhonchi, symmetric air entry   Heart - normal rate, regular rhythm, normal S1, S2  Abdomen - soft, nontender, +bs  Ext-peripheral pulses normal, no pedal edema, no clubbing or cyanosis  Skin-Warm and dry. no hyperpigmentation, vitiligo, or suspicious lesions  Neuro -alert, oriented, normal speech, walks w aid of walker      Results for orders placed or performed in visit on 08/12/19   T4, FREE   Result Value Ref Range    T4, Free 1.20 0.82 - 1.77 ng/dL   TSH 3RD GENERATION   Result Value Ref Range    TSH 1.760 0.450 - 4.500 uIU/mL       Assessment/Plan:    ICD-10-CM ICD-9-CM    1. Medicare annual wellness visit, subsequent Z00.00 V70.0    2. Hypotension, unspecified hypotension type I95.9 458.9    3. History of anemia Z86.2 V12.3 CBC WITH AUTOMATED DIFF      FERRITIN   4. Spinal stenosis of cervical region M48.02 723.0 AMB SUPPLY ORDER   5. Methadone use ([facility]) F11.20 304.00    6. Chronic narcotic dependence (HCC) F11.20 304.91    7. Chronic prescription benzodiazepine use Z79.899 V58.69    8. Poor dentition K08.9 525.9    9.  Encounter for immunization Z23 V03.89 INFLUENZA VACCINE INACTIVATED (IIV), SUBUNIT, ADJUVANTED, IM      CANCELED: INFLUENZA VIRUS VAC QUAD,SPLIT,PRESV FREE SYRINGE IM     Orders Placed This Encounter    AMB SUPPLY ORDER     Shower chair    INFLUENZA VACCINE INACTIVATED (IIV), SUBUNIT, ADJUVANTED, IM    CBC WITH AUTOMATED DIFF    FERRITIN    tiZANidine (ZANAFLEX) 4 mg tablet     Sig: TAKE ONE TABLET BY MOUTH TWICE A DAY AS NEEDED FOR PAIN     Dispense:  60 Tab     Refill:  1    ondansetron (ZOFRAN ODT) 8 mg disintegrating tablet     Sig: Take 1 Tab by mouth every twelve (12) hours as needed for Nausea. Dispense:  30 Tab     Refill:  2    varicella-zoster recombinant, PF, (SHINGRIX) 50 mcg/0.5 mL susr injection     Si.5 mL by IntraMUSCular route once for 1 dose. Dispense:  0.5 mL     Refill:  1     1. Medicare annual wellness visit, subsequent  Completed    2. Hypotension, unspecified hypotension type  D/w pt risks of hypotension and close f/u  D/w pt the effect of low bp and chronic narcotic analgesic use      3. History of anemia  Will fax results to pts orthopedist  - CBC WITH AUTOMATED DIFF  - FERRITIN    4. Spinal stenosis of cervical region  Pt will be scheduling surgery in nr future  - AMB SUPPLY ORDER    5. Methadone use ([facility] [address]  Noted    6. Chronic narcotic dependence (HCC)  Noted    7. Chronic prescription benzodiazepine use  noted    8. Poor dentition  Improved, slightly    9. Encounter for immunization  Flu vaccine administered  - INFLUENZA VACCINE INACTIVATED (IIV), SUBUNIT, ADJUVANTED, IM      There are no Patient Instructions on file for this visit. I have reviewed with the patient details of the assessment and plan and all questions were answered. Relevent patient education was performed. The most recent lab findings were reviewed with the patient. An After Visit Summary was printed and given to the patient. >50 minutes spent with the patient going over history, examining her in detail, reviewing  records, and discussing  Diagnosis, prognosis and further treatment options.

## 2019-10-10 ENCOUNTER — DOCUMENTATION ONLY (OUTPATIENT)
Dept: INTERNAL MEDICINE CLINIC | Age: 69
End: 2019-10-10

## 2019-10-10 LAB
BASOPHILS # BLD AUTO: 0.1 X10E3/UL (ref 0–0.2)
BASOPHILS NFR BLD AUTO: 1 %
EOSINOPHIL # BLD AUTO: 0.6 X10E3/UL (ref 0–0.4)
EOSINOPHIL NFR BLD AUTO: 8 %
ERYTHROCYTE [DISTWIDTH] IN BLOOD BY AUTOMATED COUNT: 13 % (ref 12.3–15.4)
FERRITIN SERPL-MCNC: 67 NG/ML (ref 30–400)
HCT VFR BLD AUTO: 37.5 % (ref 37.5–51)
HGB BLD-MCNC: 12.4 G/DL (ref 13–17.7)
IMM GRANULOCYTES # BLD AUTO: 0 X10E3/UL (ref 0–0.1)
IMM GRANULOCYTES NFR BLD AUTO: 0 %
LYMPHOCYTES # BLD AUTO: 1.8 X10E3/UL (ref 0.7–3.1)
LYMPHOCYTES NFR BLD AUTO: 25 %
MCH RBC QN AUTO: 30.1 PG (ref 26.6–33)
MCHC RBC AUTO-ENTMCNC: 33.1 G/DL (ref 31.5–35.7)
MCV RBC AUTO: 91 FL (ref 79–97)
MONOCYTES # BLD AUTO: 0.9 X10E3/UL (ref 0.1–0.9)
MONOCYTES NFR BLD AUTO: 12 %
NEUTROPHILS # BLD AUTO: 3.8 X10E3/UL (ref 1.4–7)
NEUTROPHILS NFR BLD AUTO: 54 %
PLATELET # BLD AUTO: 355 X10E3/UL (ref 150–450)
RBC # BLD AUTO: 4.12 X10E6/UL (ref 4.14–5.8)
WBC # BLD AUTO: 7.1 X10E3/UL (ref 3.4–10.8)

## 2020-01-08 DIAGNOSIS — R79.89 LOW TESTOSTERONE IN MALE: ICD-10-CM

## 2020-01-08 RX ORDER — TESTOSTERONE CYPIONATE 200 MG/ML
INJECTION INTRAMUSCULAR
Qty: 10 ML | Refills: 1 | Status: SHIPPED | OUTPATIENT
Start: 2020-01-08 | End: 2020-06-03

## 2020-01-23 RX ORDER — TIZANIDINE 4 MG/1
TABLET ORAL
Qty: 60 TAB | Refills: 1 | Status: SHIPPED | OUTPATIENT
Start: 2020-01-23 | End: 2020-05-12

## 2020-02-07 ENCOUNTER — TELEPHONE (OUTPATIENT)
Dept: INTERNAL MEDICINE CLINIC | Age: 70
End: 2020-02-07

## 2020-02-07 NOTE — TELEPHONE ENCOUNTER
Mandy Romero physical therapist with [facility] called stating pt has been taking  Lisinopril with carvadolol l. He is currently out of the lisinopril. She states they did not have the lisinopril  on his current med list so they need it updated please. And she wants to know if you can fill this medication.  Please call her @ [phone] or marline the patient @ [phone]

## 2020-02-10 RX ORDER — LISINOPRIL 40 MG/1
40 TABLET ORAL DAILY
Qty: 90 TAB | Refills: 1 | Status: SHIPPED | OUTPATIENT
Start: 2020-02-10

## 2020-05-12 RX ORDER — TIZANIDINE 4 MG/1
TABLET ORAL
Qty: 60 TAB | Refills: 0 | Status: SHIPPED | OUTPATIENT
Start: 2020-05-12 | End: 2020-07-08

## 2020-06-01 DIAGNOSIS — R79.89 LOW TESTOSTERONE IN MALE: ICD-10-CM

## 2020-06-03 RX ORDER — TESTOSTERONE CYPIONATE 200 MG/ML
INJECTION INTRAMUSCULAR
Qty: 2 ML | Refills: 5 | Status: SHIPPED | OUTPATIENT
Start: 2020-06-03 | End: 2020-12-30 | Stop reason: SDUPTHER

## 2020-07-07 RX ORDER — SYRINGE WITH NEEDLE, 1 ML 25GX5/8"
SYRINGE, EMPTY DISPOSABLE MISCELLANEOUS
Qty: 12 SYRINGE | Refills: 1 | Status: SHIPPED | OUTPATIENT
Start: 2020-07-07 | End: 2020-12-30 | Stop reason: SDUPTHER

## 2020-07-08 RX ORDER — TIZANIDINE 4 MG/1
TABLET ORAL
Qty: 60 TAB | Refills: 0 | Status: SHIPPED | OUTPATIENT
Start: 2020-07-08

## 2020-11-24 ENCOUNTER — TELEPHONE (OUTPATIENT)
Dept: ENDOCRINOLOGY | Age: 70
End: 2020-11-24

## 2020-11-24 DIAGNOSIS — R79.89 LOW TESTOSTERONE IN MALE: ICD-10-CM

## 2020-11-24 RX ORDER — TESTOSTERONE CYPIONATE 200 MG/ML
INJECTION INTRAMUSCULAR
Qty: 2 ML | Refills: 4 | OUTPATIENT
Start: 2020-11-24

## 2020-11-25 NOTE — TELEPHONE ENCOUNTER
Please send a fax to [address] [place] [address] and have them stop sending refills for his testosterone. he is no longer under Dr. Rosana De La Rosa care since he left our practice on 1/19/20 and he will need to contact his PCP or new endocrinologist for further refills of this medication.

## 2020-12-30 ENCOUNTER — VIRTUAL VISIT (OUTPATIENT)
Dept: ENDOCRINOLOGY | Age: 70
End: 2020-12-30
Payer: MEDICARE

## 2020-12-30 DIAGNOSIS — I10 ESSENTIAL HYPERTENSION: ICD-10-CM

## 2020-12-30 DIAGNOSIS — E29.1 SECONDARY MALE HYPOGONADISM: Primary | ICD-10-CM

## 2020-12-30 PROCEDURE — 99443 PR PHYS/QHP TELEPHONE EVALUATION 21-30 MIN: CPT | Performed by: INTERNAL MEDICINE

## 2020-12-30 RX ORDER — TESTOSTERONE CYPIONATE 200 MG/ML
INJECTION INTRAMUSCULAR
Qty: 2 ML | Refills: 5 | Status: SHIPPED | OUTPATIENT
Start: 2020-12-30 | End: 2021-06-21

## 2020-12-30 RX ORDER — SYRINGE WITH NEEDLE, 1 ML 25GX5/8"
SYRINGE, EMPTY DISPOSABLE MISCELLANEOUS
Qty: 12 SYRINGE | Refills: 3 | Status: SHIPPED | OUTPATIENT
Start: 2020-12-30 | End: 2022-01-12

## 2020-12-30 NOTE — LETTER
12/30/2020 1:15 PM 
 
Mr. Ashu Lebron [address] [facility] [address] [phone] 
 
 
1) Restart testosterone injections 100 mg = 0.5 ml once a week on a day of your choice. 2) Give yourself 3 doses one week apart. 3-4 days after your 3rd dose, please go and have your labs drawn using the enclosed lab slip. 3) Please come for a follow up visit on 6/23/21 at 3:10pm in our [facility] office. The address is [address] [place] [address] 202. [address] in the [facility] ([address] [facility]) If you have any questions, please don't hesitate to call me at [phone].  
 
Sincerely, 
 
 
 
Michele Hollis MD

## 2020-12-30 NOTE — PROGRESS NOTES
Chief Complaint   Patient presents with   Tara Fernandez Follow-up     low-testosterone    Other     [phone] Phone call    Other     pcp and pharmacy call       **THIS IS A VIRTUAL VISIT VIA A TELEPHONE ENCOUNTER. PATIENT AGREED TO HAVE THEIR CARE DELIVERED OVER THE PHONE IN PLACE OF THEIR REGULARLY SCHEDULED OFFICE VISIT**    History of Present Illness: Eliot Mccullough is a 72 y.o. male here for follow up of hypogonadism. Last visit with Dr. Rosa Isela Dixon was in 8/19. Had been taking testosterone  mg = 0.5 ml every week until his supply ran out about a month ago. Over the past month, has had more fatigue and muscle weakness. No breast tenderness. No increase in hot flashes. Some increase in dizziness. Weight was 202 in 8/19 and currently is up to 225. Not currently having any suicidal ideation and had a slight funk over the holidays but is doing better today. Has never had his cortisol level checked but tends to have high BP and takes lisinopril and coreg for this. Still taking methadone and norco at the same doses as last year. Current Outpatient Medications   Medication Sig    tiZANidine (ZANAFLEX) 4 mg tablet TAKE ONE TABLET BY MOUTH TWICE A DAY AS NEEDED FOR PAIN    Syringe with Needle, Disp, (BD Luer-Fabricio Syringe) 3 mL 21 gauge x 1\" syrg USE AS DIRECTED EVERY 7 DAYS--MUST ESTABLISH WITH NEW ENDOCRINOLOGIST OR CONTACT PCP FOR FURTHER REFILLS    testosterone cypionate (DEPOTESTOTERONE CYPIONATE) 200 mg/mL injection INJECT 0.5ML ONCE EVERY 7 DAYS--MUST ESTABLISH WITH NEW ENDOCRINOLOGIST OR CONTACT PCP FOR FURTHER REFILLS    lisinopril (PRINIVIL, ZESTRIL) 40 mg tablet Take 1 Tab by mouth daily.  diclofenac (VOLTAREN) 1 % gel APPLY 4 GRAMS TOPICALLY TO KNEES THREE TIMES A DAY    carvedilol (COREG) 25 mg tablet Take 1 Tab by mouth two (2) times daily (with meals).  methadone (DOLOPHINE) 10 mg tablet Take 1 Tab by mouth two (2) times a day. Max Daily Amount: 20 mg.     pregabalin (LYRICA) 225 mg capsule Take 1 Cap by mouth two (2) times a day. Max Daily Amount: 450 mg.    HYDROcodone-acetaminophen (NORCO) 5-325 mg per tablet Take 1 Tab by mouth every four (4) hours as needed. Max Daily Amount: 6 Tabs.  buPROPion  mg Tb24 Take 450 mg by mouth every morning.  DULoxetine (CYMBALTA) 60 mg capsule Take 1 Cap by mouth daily.  LORazepam (ATIVAN) 1 mg tablet Take 1 Tab by mouth every eight (8) hours as needed for Anxiety. Max Daily Amount: 3 mg. TAPERING DOSE, DO NOT REFILL    CALCIUM 600 WITH VITAMIN D3 600 mg(1,500mg) -400 unit chew 1 Tab daily. No current facility-administered medications for this visit. Allergies   Allergen Reactions    Amitriptyline Other (comments)     hallucinating    Neurontin [Gabapentin] Other (comments)     insomnia    Percocet [Oxycodone-Acetaminophen] Nausea Only and Anxiety     Pt states he can tolerate small doses at 10 mg a day     Review of Systems: PER HPI    Physical Examination: NOT PERFORMED DUE TO THIS BEING A TELEPHONE CALL      Data Reviewed:   - none new for review    Assessment/Plan:   1. Secondary male hypogonadism: Was diagnosed with this in 2010 by Dr. Jaiden Ham and likely due to chronic opiate use from spinal stenosis. Was initially tried on androgel but this didn't help his symptoms and Dr. Jiaden Ham started him on weekly injections in 2011 with 100 mg = 0.5 ml and this has worked well to control his levels. Last testosterone was 423 in 2/19. Has been off injections for a month so will restart his prior dose and repeat labs 3-4 days after his 3rd injection. - restart testosterone cypionate 200 mg/ml 100 mg = 0.5 ml IM weekly  - check total testosterone and Hgb 3-4 days after his 3rd injection       2. Essential hypertension:  -  cont current regimen for now          Patient Instructions   1) Restart testosterone injections 100 mg = 0.5 ml once a week on a day of your choice. 2) Give yourself 3 doses one week apart.   3-4 days after your 3rd dose, please go and have your labs drawn using the enclosed lab slip. 3) Please come for a follow up visit on 6/23/21 at 3:10pm in our [address] [place] [address] office. The address is [address] [place] [address]. [facility], [address] [facility] in the [facility] ([place] [place])         Follow-up and Dispositions    · Return 6/23/21 at 3:10pm.           We spent 25 minutes of total time together during this virtual visit with a telephone encounter. All questions were answered and a copy of the instructions were sent to him via a letter.     Copy sent to:  Supa Dillon MD

## 2020-12-30 NOTE — PATIENT INSTRUCTIONS
1) Restart testosterone injections 100 mg = 0.5 ml once a week on a day of your choice. 2) Give yourself 3 doses one week apart. 3-4 days after your 3rd dose, please go and have your labs drawn using the enclosed lab slip. 3) Please come for a follow up visit on 6/23/21 at 3:10pm in our [facility] office. The address is [address] [place] [address] [place] [address] [place] [address] in the [facility] ([address] [facility])

## 2021-03-09 ENCOUNTER — TELEPHONE (OUTPATIENT)
Dept: ENDOCRINOLOGY | Age: 71
End: 2021-03-09

## 2021-03-09 ENCOUNTER — TRANSCRIBE ORDER (OUTPATIENT)
Dept: ENDOCRINOLOGY | Age: 71
End: 2021-03-09

## 2021-03-09 NOTE — TELEPHONE ENCOUNTER
----- Message from Nikole Pippa sent at 3/9/2021  2:14 PM EST -----  Regarding: MD Rose/ telephone  Patient's first and last name:    Caroline Cook   : 1950    Caller's first and last name:   pt    Reason for call:    Pre Authorization    Callback required yes/no and why: yes     Best contact number(s):  [phone]    Details to clarify the request:   Pt has recently changed health insurance to [address] [place] [address]. Insurance is requesting PA for  RX DEPOTESTOTERONE CYPIONATE 200 mg. Also, pt has completed recent labs with PCP that will be sent to Dr. Baudilio Watters soon.

## 2021-03-11 NOTE — TELEPHONE ENCOUNTER
I tried to submit a prior authorization for his testosterone but received the following reply: Authorization already on file for this request.    Can you check with his pharmacy and find out if his prescription went through or what else I may need to do?

## 2021-03-12 NOTE — TELEPHONE ENCOUNTER
Per Dayana Garcia with [facility] pharmacy it has been filled and it will be delivered to patient upon his request.  Patient was notified and voiced understanding,  He stated he called because he stated that he received a letter stating he need a prior auth. He stated at this time he does not need a refill and he was just calling because of the letter. I informed him of what was the reply through covermymeds and informed him if he have any difficulty with a refill in the future to call us back. Patient voiced understanding,.

## 2021-04-08 ENCOUNTER — TELEPHONE (OUTPATIENT)
Dept: ENDOCRINOLOGY | Age: 71
End: 2021-04-08

## 2021-04-08 NOTE — TELEPHONE ENCOUNTER
I spoke with patient and reminded him of the conversation we had in March when a prior auth was submitted ( see encounter from 3/9) and the reply stated that an Karthik Deer was already on file. I informed him that a prior Brandon Auburn will be submitted.

## 2021-04-08 NOTE — TELEPHONE ENCOUNTER
Pt called to follow up on his Rx Testosterone. Pt states he called his pharmacy and was told they faxed his request twice already and waiting on approval. Pt is asking a return call when you have a chance [phone].

## 2021-04-11 NOTE — TELEPHONE ENCOUNTER
Please him know:    I submitted an authorization for the testosterone through our electronic system called [employer] and received notification that this med has been approved so you should be able to get this from the local or mail pharmacy that normally dispenses this for you. Please let me know if you have any trouble getting this filled. One of the criteria in the clinical questions was has patient had a testosterone level drawn in the past 180 days. Although he has not had this drawn yet, I answered yes so that he would be approved and be allowed to continue therapy. I need him to have his labs drawn as soon as possible to ensure a change in dose is not needed and he should go 3-4 days after an injection using the order that is in the [employer] system under my name and I will be in touch with the results.

## 2021-04-12 NOTE — TELEPHONE ENCOUNTER
----- Message from Antolin Villela sent at 4/12/2021  1:16 PM EDT -----  Regarding: Dr. Jimmy Riojas Patient return call    Caller's first and last name and relationship (if not the patient):      Best contact number(s): [phone]      Whose call is being returned: Returning missed call from 's nurse.       Details to clarify the request:      Antolin Villela

## 2021-04-13 NOTE — TELEPHONE ENCOUNTER
Pt notified of message per Dr. Amanda Grider and voiced understanding of what was read to him. He stated that he had his labs drawn 2 months ago at [facility]. He stated that he will have them fax a copy to our office.

## 2021-06-21 DIAGNOSIS — E29.1 SECONDARY MALE HYPOGONADISM: ICD-10-CM

## 2021-06-21 RX ORDER — TESTOSTERONE CYPIONATE 200 MG/ML
INJECTION INTRAMUSCULAR
Qty: 2 ML | Refills: 5 | Status: SHIPPED | OUTPATIENT
Start: 2021-06-21 | End: 2021-12-16 | Stop reason: SDUPTHER

## 2021-09-03 ENCOUNTER — TRANSCRIBE ORDER (OUTPATIENT)
Dept: SCHEDULING | Age: 71
End: 2021-09-03

## 2021-09-03 DIAGNOSIS — M54.17 LUMBOSACRAL RADICULOPATHY: Primary | ICD-10-CM

## 2021-12-16 DIAGNOSIS — E29.1 SECONDARY MALE HYPOGONADISM: ICD-10-CM

## 2021-12-16 RX ORDER — TESTOSTERONE CYPIONATE 200 MG/ML
INJECTION INTRAMUSCULAR
Qty: 2 ML | Refills: 5 | Status: SHIPPED | OUTPATIENT
Start: 2021-12-16 | End: 2022-01-12

## 2022-01-12 ENCOUNTER — OFFICE VISIT (OUTPATIENT)
Dept: ENDOCRINOLOGY | Age: 72
End: 2022-01-12
Payer: MEDICARE

## 2022-01-12 VITALS
WEIGHT: 233.3 LBS | SYSTOLIC BLOOD PRESSURE: 148 MMHG | DIASTOLIC BLOOD PRESSURE: 80 MMHG | BODY MASS INDEX: 26.99 KG/M2 | HEIGHT: 78 IN | HEART RATE: 73 BPM

## 2022-01-12 DIAGNOSIS — I10 ESSENTIAL HYPERTENSION: ICD-10-CM

## 2022-01-12 DIAGNOSIS — E29.1 SECONDARY MALE HYPOGONADISM: Primary | ICD-10-CM

## 2022-01-12 DIAGNOSIS — R97.20 ELEVATED PROSTATE SPECIFIC ANTIGEN (PSA): ICD-10-CM

## 2022-01-12 PROCEDURE — G9717 DOC PT DX DEP/BP F/U NT REQ: HCPCS | Performed by: INTERNAL MEDICINE

## 2022-01-12 PROCEDURE — G8753 SYS BP > OR = 140: HCPCS | Performed by: INTERNAL MEDICINE

## 2022-01-12 PROCEDURE — G8536 NO DOC ELDER MAL SCRN: HCPCS | Performed by: INTERNAL MEDICINE

## 2022-01-12 PROCEDURE — 1101F PT FALLS ASSESS-DOCD LE1/YR: CPT | Performed by: INTERNAL MEDICINE

## 2022-01-12 PROCEDURE — G8427 DOCREV CUR MEDS BY ELIG CLIN: HCPCS | Performed by: INTERNAL MEDICINE

## 2022-01-12 PROCEDURE — 3017F COLORECTAL CA SCREEN DOC REV: CPT | Performed by: INTERNAL MEDICINE

## 2022-01-12 PROCEDURE — G8419 CALC BMI OUT NRM PARAM NOF/U: HCPCS | Performed by: INTERNAL MEDICINE

## 2022-01-12 PROCEDURE — G8754 DIAS BP LESS 90: HCPCS | Performed by: INTERNAL MEDICINE

## 2022-01-12 PROCEDURE — 99214 OFFICE O/P EST MOD 30 MIN: CPT | Performed by: INTERNAL MEDICINE

## 2022-01-12 RX ORDER — TESTOSTERONE CYPIONATE 200 MG/ML
INJECTION INTRAMUSCULAR
Qty: 2 ML | Refills: 5
Start: 2022-01-12 | End: 2022-06-06

## 2022-01-12 RX ORDER — SYRINGE W-NEEDLE,DISPOSAB,3 ML 21 G X 1"
SYRINGE, EMPTY DISPOSABLE MISCELLANEOUS
Qty: 12 EACH | Refills: 3 | Status: SHIPPED | OUTPATIENT
Start: 2022-01-12 | End: 2022-09-15 | Stop reason: SDUPTHER

## 2022-01-12 NOTE — PATIENT INSTRUCTIONS
1) Plan on having Dr. Jake Roth repeat your testosterone and PSA and hemoglobin (red blood cell count) when you see him next and have him send me a copy of the results. 2) Because your PSA (prostate cancer blood test) has been elevated, to be safe, please decrease your testosterone to 80 mg = 0.4 ml every Friday starting with this week's dose.

## 2022-01-12 NOTE — PROGRESS NOTES
Chief Complaint   Patient presents with    Hypogonadism     pcp and pharmacy confirmed     History of Present Illness: Dottie Leal is a 71 y.o. male here for follow up of hypogonadism. No major change to health since last visit in 12/20. Has been consistently getting his T injections every Friday at 0.5 ml = 100 mg. Did take a dose last Friday. Does still have underlying fatigue but less than last year when he was off medication and has had some improvement in muscle weakness back on treatment and did not feel as down this year over the holidays like he did in 2020 off testosterone. Switched to Dr. Erica Galeano for PCP and his PSA was 4.9 in 2/21 and his Testosterone was 597 in 3/21 and repeat PSA was 5.5 in 8/21. No FH of prostate cancer. Does have some incomplete emptying but no dysuria. Only gets up once to urinate at night. No hesitancy. No breast tenderness. No sweats or hot flashes. Weight up from 202 lbs in 8/19 with Dr. Nieto Public up to as high as 240 lbs but currently is at 233 today and hope is to get to down to 225 lbs. Did get up early at 4:30am due to the pain and had 2 cups of coffee today and hasn't been told that his BP was uncontrolled at PCP's office. Current Outpatient Medications   Medication Sig    testosterone cypionate (DEPOTESTOTERONE CYPIONATE) 200 mg/mL injection INJECT 0.5ML INTRAMUSCULARLY ONCE EVERY 7 DAYS    Syringe with Needle, Disp, (BD Luer-Fabricio Syringe) 3 mL 21 gauge x 1\" syrg USE AS DIRECTED EVERY 7 DAYS--MUST ESTABLISH WITH NEW ENDOCRINOLOGIST OR CONTACT PCP FOR FURTHER REFILLS    tiZANidine (ZANAFLEX) 4 mg tablet TAKE ONE TABLET BY MOUTH TWICE A DAY AS NEEDED FOR PAIN    lisinopril (PRINIVIL, ZESTRIL) 40 mg tablet Take 1 Tab by mouth daily.  diclofenac (VOLTAREN) 1 % gel APPLY 4 GRAMS TOPICALLY TO KNEES THREE TIMES A DAY    carvedilol (COREG) 25 mg tablet Take 1 Tab by mouth two (2) times daily (with meals).     methadone (DOLOPHINE) 10 mg tablet Take 1 Tab by mouth two (2) times a day. Max Daily Amount: 20 mg.  pregabalin (LYRICA) 225 mg capsule Take 1 Cap by mouth two (2) times a day. Max Daily Amount: 450 mg.    HYDROcodone-acetaminophen (NORCO) 5-325 mg per tablet Take 1 Tab by mouth every four (4) hours as needed. Max Daily Amount: 6 Tabs.  buPROPion  mg Tb24 Take 450 mg by mouth every morning.  LORazepam (ATIVAN) 1 mg tablet Take 1 Tab by mouth every eight (8) hours as needed for Anxiety. Max Daily Amount: 3 mg. TAPERING DOSE, DO NOT REFILL     No current facility-administered medications for this visit. Allergies   Allergen Reactions    Amitriptyline Other (comments)     hallucinating    Neurontin [Gabapentin] Other (comments)     insomnia    Percocet [Oxycodone-Acetaminophen] Nausea Only and Anxiety     Pt states he can tolerate small doses at 10 mg a day     Review of Systems: PER HPI    Physical Examination:  Blood pressure (!) 148/80, pulse 73, height 6' 7\" (2.007 m), weight 233 lb 4.8 oz (105.8 kg). Repeat manually 162/70 in left arm.  - General: pleasant, no distress, good eye contact, walks slowly with a rolling walker  - Neck: no carotid bruits  - Cardiovascular: regular, normal rate, nl s1 and s2, no m/r/g   - Respiratory: clear to auscultation bilaterally   - Integumentary: skin is normal, no edema  - Psychiatric: normal mood and affect    Data Reviewed:   - labs as above    Assessment/Plan:     1. Secondary male hypogonadism: Was diagnosed with this in 2010 by Dr. Bandar Coleman and likely due to chronic opiate use from spinal stenosis. Was initially tried on androgel but this didn't help his symptoms and Dr. Bandar Coleman started him on weekly injections in 2011 with 100 mg = 0.5 ml and this has worked well to control his levels. Last testosterone was 423 in 2/19. Had been off injections for a month before establishing with me in 12/20 and with restarting this dose his T level was 597 in 3/21.   His PSA has been rising so to be safe, will decrease his testosterone dose  - decrease testosterone cypionate 200 mg/ml to 80 mg = 0.45 ml IM weekly on Fridays  - check total testosterone and Hgb and PSA at PCP's office in the next month         2. Essential hypertension: BP > 140/90 but was in more pain today and was told it's fine at PCP's office  -  cont current regimen for now        3. Elevated prostate specific antigen (PSA): PSA 4.9 in 2/21 up to 5.5 in 8/21. Unclear if this could be early prostate cancer or from BPH but will decrease his T dose to be safe  - check PSA at PCP's office        Patient Instructions   1) Plan on having Dr. Erica Galeano repeat your testosterone and PSA and hemoglobin (red blood cell count) when you see him next and have him send me a copy of the results. 2) Because your PSA (prostate cancer blood test) has been elevated, to be safe, please decrease your testosterone to 80 mg = 0.4 ml every Friday starting with this week's dose. Follow-up and Dispositions    · Return in about 6 months (around 7/12/2022).                Copy sent to:  Ching Stallworth MD

## 2022-03-18 PROBLEM — L02.91 ABSCESS: Status: ACTIVE | Noted: 2019-01-14

## 2022-03-18 PROBLEM — F41.9 ANXIETY DISORDER: Status: ACTIVE | Noted: 2019-02-01

## 2022-03-19 PROBLEM — L03.90 CELLULITIS: Status: ACTIVE | Noted: 2019-01-13

## 2022-03-19 PROBLEM — F11.90 METHADONE USE: Status: ACTIVE | Noted: 2019-02-03

## 2022-03-19 PROBLEM — K08.9 POOR DENTITION: Status: ACTIVE | Noted: 2019-02-01

## 2022-03-19 PROBLEM — Z79.899 CHRONIC PRESCRIPTION BENZODIAZEPINE USE: Status: ACTIVE | Noted: 2019-10-09

## 2022-03-19 PROBLEM — F11.20 CHRONIC NARCOTIC DEPENDENCE (HCC): Status: ACTIVE | Noted: 2019-10-09

## 2022-03-20 PROBLEM — M17.0 PRIMARY OSTEOARTHRITIS OF BOTH KNEES: Status: ACTIVE | Noted: 2019-02-01

## 2022-06-06 DIAGNOSIS — E29.1 SECONDARY MALE HYPOGONADISM: ICD-10-CM

## 2022-06-06 RX ORDER — TESTOSTERONE CYPIONATE 200 MG/ML
INJECTION INTRAMUSCULAR
Qty: 2 ML | Refills: 5 | Status: SHIPPED | OUTPATIENT
Start: 2022-06-06

## 2022-09-15 RX ORDER — SYRINGE W-NEEDLE,DISPOSAB,3 ML 21 G X 1"
SYRINGE, EMPTY DISPOSABLE MISCELLANEOUS
Qty: 12 EACH | Refills: 4 | Status: SHIPPED | OUTPATIENT
Start: 2022-09-15

## 2022-09-26 ENCOUNTER — OFFICE VISIT (OUTPATIENT)
Dept: NEUROLOGY | Age: 72
End: 2022-09-26
Payer: MEDICARE

## 2022-09-26 VITALS
BODY MASS INDEX: 25.94 KG/M2 | HEART RATE: 74 BPM | WEIGHT: 224.2 LBS | SYSTOLIC BLOOD PRESSURE: 122 MMHG | HEIGHT: 78 IN | OXYGEN SATURATION: 96 % | RESPIRATION RATE: 16 BRPM | DIASTOLIC BLOOD PRESSURE: 84 MMHG

## 2022-09-26 DIAGNOSIS — R51.9 CHRONIC DAILY HEADACHE: Primary | ICD-10-CM

## 2022-09-26 PROCEDURE — G8752 SYS BP LESS 140: HCPCS | Performed by: PSYCHIATRY & NEUROLOGY

## 2022-09-26 PROCEDURE — G8427 DOCREV CUR MEDS BY ELIG CLIN: HCPCS | Performed by: PSYCHIATRY & NEUROLOGY

## 2022-09-26 PROCEDURE — G8417 CALC BMI ABV UP PARAM F/U: HCPCS | Performed by: PSYCHIATRY & NEUROLOGY

## 2022-09-26 PROCEDURE — 3017F COLORECTAL CA SCREEN DOC REV: CPT | Performed by: PSYCHIATRY & NEUROLOGY

## 2022-09-26 PROCEDURE — G9717 DOC PT DX DEP/BP F/U NT REQ: HCPCS | Performed by: PSYCHIATRY & NEUROLOGY

## 2022-09-26 PROCEDURE — 1123F ACP DISCUSS/DSCN MKR DOCD: CPT | Performed by: PSYCHIATRY & NEUROLOGY

## 2022-09-26 PROCEDURE — 1101F PT FALLS ASSESS-DOCD LE1/YR: CPT | Performed by: PSYCHIATRY & NEUROLOGY

## 2022-09-26 PROCEDURE — 99204 OFFICE O/P NEW MOD 45 MIN: CPT | Performed by: PSYCHIATRY & NEUROLOGY

## 2022-09-26 PROCEDURE — G8754 DIAS BP LESS 90: HCPCS | Performed by: PSYCHIATRY & NEUROLOGY

## 2022-09-26 PROCEDURE — G8536 NO DOC ELDER MAL SCRN: HCPCS | Performed by: PSYCHIATRY & NEUROLOGY

## 2022-09-26 RX ORDER — TOPIRAMATE 50 MG/1
TABLET, FILM COATED ORAL
Qty: 180 TABLET | Refills: 1 | Status: SHIPPED | OUTPATIENT
Start: 2022-09-26

## 2022-09-26 NOTE — LETTER
10/2/2022    Patient: Yo Calero   YOB: 1950   Date of Visit: 9/26/2022     Vita Abreu MD  [facility] [phone]  Via Fax: [phone]    Dear Vita Abreu MD,      Thank you for referring Mr. Yo Calero to [address] [place] [address] for evaluation. My notes for this consultation are attached. If you have questions, please do not hesitate to call me. I look forward to following your patient along with you.       Sincerely,    Mark Hernandez MD

## 2022-09-26 NOTE — PROGRESS NOTES
Neurology Consult Note      HISTORY PROVIDED BY: patient    Chief Complaint:   Chief Complaint   Patient presents with    Headache     Pt wants botox; pt states headaches going on for 15 years; pt has synosis of the spine; pt states he does not have the ability to relax the frontal lobe; pt states always has tension headache     New Patient      Subjective:    Jadiel Alatorre is a 71 y.o. right handed male who presents in consultation for headaches and he is interested in botox. Pt reports onset of headaches 15y ago when he was diagnosed with spinal stenosis. Pain is bifrontal, squeezing pressure, occurring daily, varies in intensity, constant, worse with activities. +N/V, No P/P. Has never taken prevention medication for MHA. He has severe cervical and lumbar stenosis on MRI 2015, has had cervical laminectomy. He is taking daily Norco, methadone, tizanidine, and Lyrica. He is also on carvedilol. Doesn't drink much water. Drinks a lot of caffeinated tea. States he had these HAs prior to drinking this much caffeine. Taking marijuana daily x 6 months. Sleeps well for a period of time, but wakes due to pain, able to go back to sleep. No snoring. Was exercising daily, but slowed down over the last few years, particularly after having COVID-19.   [facility] 1/12/19 was normal.  He was hallucinating with amitriptyline, started for pain in past.     Past Medical History:   Diagnosis Date    Abscess 01/14/2019    Anemia NEC     Asthma     Chronic pain     Due to spinal stenosis    Constipation     HTN (hypertension)     Hypertension     Hypogonadism male     Psychiatric disorder     major depressive disorder    Sleep trouble     Spinal stenosis     Cervical and Lumbar    Vertebral compression fracture (HCC)       Past Surgical History:   Procedure Laterality Date    HX BACK SURGERY      HX CATARACT REMOVAL Bilateral     HX CERVICAL LAMINECTOMY      HX CYST INCISION AND DRAINAGE  01/14/2019    Drain left lateral thigh abscess HX TONSILLECTOMY        Social History     Socioeconomic History    Marital status:      Spouse name: Not on file    Number of children: Not on file    Years of education: Not on file    Highest education level: Not on file   Occupational History    Occupation: Retired   Tobacco Use    Smoking status: Never    Smokeless tobacco: Never   Substance and Sexual Activity    Alcohol use: No     Comment: Drank six weeks ago. Drug use: Yes     Frequency: 7.0 times per week     Types: Marijuana    Sexual activity: Never   Other Topics Concern    Not on file   Social History Narrative    Lives in [address]     Social Determinants of Health     Financial Resource Strain: Not on file   Food Insecurity: Not on file   Transportation Needs: Not on file   Physical Activity: Not on file   Stress: Not on file   Social Connections: Not on file   Intimate Partner Violence: Not on file   Housing Stability: Not on file     Family History   Problem Relation Age of Onset    Heart Disease Father     Hypertension Father     Stroke Father        Objective:   Review of Systems   Constitutional:  Positive for malaise/fatigue. Poor appetite   HENT:  Positive for hearing loss. Gastrointestinal:  Positive for abdominal pain and constipation. Musculoskeletal:  Positive for falls, joint pain and myalgias. Neurological:  Positive for sensory change and headaches. Psychiatric/Behavioral:  Positive for depression. The patient is nervous/anxious. All other systems reviewed and are negative. Allergies   Allergen Reactions    Amitriptyline Other (comments)     hallucinating    Neurontin [Gabapentin] Other (comments)     insomnia    Percocet [Oxycodone-Acetaminophen] Nausea Only and Anxiety     Pt states he can tolerate small doses at 10 mg a day        Meds:  Outpatient Medications Prior to Visit   Medication Sig Dispense Refill    MULTIVITAMIN PO Take  by mouth.       [employer] Luer Lock Syr-needle 3 mL 21 gauge x 1\" syrg USE AS DIRECTED EVERY 7 DAYS 12 Each 4    testosterone cypionate (DEPOTESTOTERONE CYPIONATE) 200 mg/mL injection INJECT 0.5ML INTRAMUSCULARLY ONCE EVERY 7 DAYS 2 mL 5    tiZANidine (ZANAFLEX) 4 mg tablet TAKE ONE TABLET BY MOUTH TWICE A DAY AS NEEDED FOR PAIN 60 Tab 0    lisinopril (PRINIVIL, ZESTRIL) 40 mg tablet Take 1 Tab by mouth daily. 90 Tab 1    diclofenac (VOLTAREN) 1 % gel APPLY 4 GRAMS TOPICALLY TO KNEES THREE TIMES A DAY      carvedilol (COREG) 25 mg tablet Take 1 Tab by mouth two (2) times daily (with meals). 180 Tab 3    methadone (DOLOPHINE) 10 mg tablet Take 1 Tab by mouth two (2) times a day. Max Daily Amount: 20 mg. 20 Tab 0    pregabalin (LYRICA) 225 mg capsule Take 1 Cap by mouth two (2) times a day. Max Daily Amount: 450 mg. 20 Cap 0    HYDROcodone-acetaminophen (NORCO) 5-325 mg per tablet Take 1 Tab by mouth every four (4) hours as needed. Max Daily Amount: 6 Tabs. 20 Tab 0    buPROPion  mg Tb24 Take 450 mg by mouth every morning. 14 Tab 1    LORazepam (ATIVAN) 1 mg tablet Take 1 Tab by mouth every eight (8) hours as needed for Anxiety. Max Daily Amount: 3 mg. TAPERING DOSE, DO NOT REFILL (Patient not taking: Reported on 9/26/2022) 21 Tab 0     No facility-administered medications prior to visit. Imaging:  MRI Results (most recent):  Results from Hospital Encounter encounter on 01/17/15    MRI LUMB SPINE W WO CONT    Narrative  **Final Report**      ICD Codes / Adm. Diagnosis: 458.9  584.9 / Hypotension, unspecified  Acute  kidney failure, unspecif  Examination:  MR Maira Knox AND WO CON  - [id number] - Jan 19 2015  7:41PM  Accession No:  [id number]  Reason:  increased back and neck pain, h/o stenosis and neck laminectomy,  please eval for      REPORT:  *PRELIMINARY REPORT*    Multilevel degenerative disc disease and spinal stenosis. Preliminary report was provided by Dr. Gina Gomez, the on-call radiologist, at    Final report to follow.     *END PRELIMINARY REPORT*    Study: MR L SPINE WITH AND WITHOUT CONTRAST    Indication:  increased back and neck pain, h/o stenosis and neck  laminectomy, please eval for    Comparison:   Lumbar spine MRI August 20, 2013. Lumbar spine x-rays January 17, 2015    Contrast: 10 mm Gadavist gadolinium contrast administered intravenously    Technique:  Magnetic resonance images of the lumbar spine were obtained. The following sequences were obtained: Sagittal T1, T2, T2 STIR; axial T1  and T2; sagittal and axial T1 postcontrast.    Findings: There is 1-2 mm anterior subluxation L4 on L5. Moderate disc space narrowing  is seen at L1-L2. Mild disc space narrowing is seen at L2-L3 and L3-L4. Mild  to moderate disc space narrowing seen at L5-S1. Mild to moderate bony  endplate changes, as well as multilevel chronic Modic type endplate changes  are seen at multiple levels. There is moderate chronic anterior wedging from  L1-L3. The conus terminates at the upper endplate of L2. No abnormality seen  in the visualized paraspinal soft tissues. Postcontrast imaging demonstrates no abnormal enhancement. T12-L1: Small disc bulge. No neuroforaminal narrowing or spinal canal  stenosis. L1-L2: Small disc osteophyte complex with small superimposed central disc  protrusion. Mild facet arthropathy. Ligamentum flavum thickening. Moderate  right and mild/moderate left neuroforaminal narrowing. Moderate spinal canal  stenosis. L2-L3: Moderate size disc osteophyte complex. Mild facet arthropathy. Ligamentum flavum thickening. Mild right and moderate to severe left  neuroforaminal narrowing. Severe spinal canal stenosis. L3-L4: Small disc bulge. Moderate to severe facet arthropathy. Ligamentum  flavum thickening. Severe bilateral neuroforaminal narrowing. Severe spinal  canal stenosis. L4-L5: Unroofing of the disc/small disc bulge. Severe facet arthropathy. Ligamentum flavum thickening. Moderate right and mild/moderate left  neuroforaminal narrowing.  Severe spinal canal stenosis. L5-S1: Small disc bulge. Fissuring of the disc. Severe facet arthropathy. Small synovial cyst seen adjacent to the right facet. Severe bilateral  neuroforaminal narrowing. No spinal canal stenosis. Overall findings above demonstrate little, if any, significant interval  change when compared to August 20, 2013 MRI. Impression  Severe lumbar spondylosis with multilevel severe neuroforaminal narrowing  and spinal canal stenosis demonstrating little, if any, significant interval  change when compared to the prior MRI. Please see above report. Signing/Reading Doctor: Bowen Ott ([id number])  Approved: Andrey Lambert ([id number])  Jan 20 2015  8:37AM   CT Results (most recent):  Results from Hospital Encounter encounter on 01/12/19    CT HEAD WO CONT    Narrative  INDICATION:   multiple falls    EXAM:  HEAD CT WITHOUT CONTRAST    COMPARISON:  January 17, 2015    TECHNIQUE:  Routine noncontrast axial head CT was performed. Sagittal and  coronal reconstructions were generated. CT dose reduction was achieved through use of a standardized protocol tailored  for this examination and automatic exposure control for dose modulation. FINDINGS:    Ventricles:  Midline, no hydrocephalus. Intracranial Hemorrhage:  None. Brain Parenchyma/Brainstem:  Normal for age. Basal Cisterns:  Normal.  Paranasal Sinuses:  Visualized sinuses are clear. Additional Comments:  N/A. Impression  IMPRESSION:    No acute process.        Reviewed records in [employer] and [facility] tab today    Lab Review   Results for orders placed or performed in visit on 10/09/19   CBC WITH AUTOMATED DIFF   Result Value Ref Range    WBC 7.1 3.4 - 10.8 x10E3/uL    RBC 4.12 (L) 4.14 - 5.80 x10E6/uL    HGB 12.4 (L) 13.0 - 17.7 g/dL    HCT 37.5 37.5 - 51.0 %    MCV 91 79 - 97 fL    MCH 30.1 26.6 - 33.0 pg    MCHC 33.1 31.5 - 35.7 g/dL    RDW 13.0 12.3 - 15.4 %    PLATELET 404 565 - 516 x10E3/uL    NEUTROPHILS 54 Not Estab. % Lymphocytes 25 Not Estab. %    MONOCYTES 12 Not Estab. %    EOSINOPHILS 8 Not Estab. %    BASOPHILS 1 Not Estab. %    ABS. NEUTROPHILS 3.8 1.4 - 7.0 x10E3/uL    Abs Lymphocytes 1.8 0.7 - 3.1 x10E3/uL    ABS. MONOCYTES 0.9 0.1 - 0.9 x10E3/uL    ABS. EOSINOPHILS 0.6 (H) 0.0 - 0.4 x10E3/uL    ABS. BASOPHILS 0.1 0.0 - 0.2 x10E3/uL    IMMATURE GRANULOCYTES 0 Not Estab. %    ABS. IMM. GRANS. 0.0 0.0 - 0.1 x10E3/uL   FERRITIN   Result Value Ref Range    Ferritin 67 30 - 400 ng/mL        Exam:  Visit Vitals  /84 (BP 1 Location: Left upper arm, BP Patient Position: Sitting, BP Cuff Size: Adult)   Pulse 74   Resp 16   Ht 6' 7\" (2.007 m)   Wt 224 lb 3.2 oz (101.7 kg)   SpO2 96%   BMI 25.26 kg/m²     General:  Alert, cooperative, no distress. Head:  Normocephalic, without obvious abnormality, atraumatic. Respiratory:  Heart:   Non labored breathing  Regular rate and rhythm, no murmurs   Neck:   2+ carotids, no bruits   Extremities: Warm, no cyanosis or edema. Pulses: 2+ radial pulses. Neurologic:  MS: Alert and oriented x 4, speech intact. Language intact. Attention and fund of knowledge appropriate. Recent and remote memory intact. Cranial Nerves:  II: visual fields Full to confrontation   II: pupils Equal, round, reactive to light   II: optic disc    III,VII: ptosis none   III,IV,VI: extraocular muscles  EOMI, no nystagmus or diplopia   V: facial light touch sensation  normal   VII: facial muscle function   symmetric   VIII: hearing intact   IX: soft palate elevation     XI: trapezius strength  5/5   XI: sternocleidomastoid strength 5/5   XII: tongue       Motor: normal bulk and tone, no tremor              Strength: 5/5 throughout, no PD  Sensory: intact to LT, PP  Coordination: FTN and HTS intact, ALICIA intact  Gait: normal gait  Reflexes: 2+ symmetric, toes downgoing           Assessment/Plan   Pt is a 71 y.o. right handed male with onset of headaches 15y ago when he was diagnosed with spinal stenosis. Pain is bifrontal, squeezing pressure, +N/V, occurring daily, constant, varies in intensity. Taking daily Norco, methadone, tizanidine, and Lyrica, and using daily marijuana and drinks a lot of caffeinated tea. Exam is non-focal and unremarkable. May have migraine headaches triggered by musculoskeletal pain, but very likely has component of rebound headaches from daily pain medications and excessive use of caffeine and marijuana. Recommend starting migraine headache prevention medication, Topamax 50 mg daily x2 weeks then 50 mg twice daily, side effects reviewed. Patient is encouraged to increase his water intake and decrease his caffeine intake. Headache prevention medications may take 6 to 8 weeks to be fully effective, instructed to call if he needs a higher dose of medication after that time. Follow-up in clinic in 6 months, instructed to call in the interim with any questions or concerns. ICD-10-CM ICD-9-CM    1. Chronic daily headache  R51.9 784.0           Signed:   Antonio Delgadillo MD  9/26/2022

## 2022-09-26 NOTE — PROGRESS NOTES
Room 9     Identified pt with two pt identifiers(name and ). Reviewed record in preparation for visit and have obtained necessary documentation. All patient medications has been reviewed. Chief Complaint   Patient presents with    Headache     Pt wants botox; pt states headaches going on for 15 years; pt has synosis of the spine; pt states he does not have the ability to relax the frontal lobe; pt states always has tension headache     New Patient       3 most recent PHQ Screens 2022   Little interest or pleasure in doing things Not at all   Feeling down, depressed, irritable, or hopeless More than half the days   Total Score PHQ 2 2     Abuse Screening Questionnaire 3/11/2014   Do you ever feel afraid of your partner? N   Are you in a relationship with someone who physically or mentally threatens you? N   Is it safe for you to go home? Y       Health Maintenance Due   Topic    Hepatitis C Screening     COVID-19 Vaccine (1)    Depression Monitoring     DTaP/Tdap/Td series (1 - Tdap)    Colorectal Cancer Screening Combo     Shingrix Vaccine Age 50> (1 of 2)    Lipid Screen     Medicare Yearly Exam     Flu Vaccine (1)         Health Maintenance Review: Patient reminded of \"due or due soon\" health maintenance. I have asked the patient to contact his/her primary care provider (PCP) for follow-up on his/her health maintenance.     Vitals:    22 1255   BP: 122/84   Pulse: 74   Resp: 16   SpO2: 96%   Weight: 224 lb 3.2 oz (101.7 kg)   Height: 6' 7\" (2.007 m)   PainSc:   3   PainLoc: Generalized       Wt Readings from Last 3 Encounters:   22 224 lb 3.2 oz (101.7 kg)   22 233 lb 4.8 oz (105.8 kg)   10/09/19 204 lb (92.5 kg)     Temp Readings from Last 3 Encounters:   10/09/19 98.2 °F (36.8 °C) (Oral)   19 98 °F (36.7 °C) (Oral)   19 97.9 °F (36.6 °C) (Oral)     BP Readings from Last 3 Encounters:   22 122/84   22 (!) 148/80   10/09/19 (!) 81/42     Pulse Readings from Last 3 Encounters:   09/26/22 74   01/12/22 73   10/09/19 71       Coordination of Care Questionnaire:   1) Have you been to an emergency room, urgent care, or hospitalized since your last visit?   no       2. Have seen or consulted any other health care provider since your last visit? YES  [facility] Jordan Siddiqui     Patient is accompanied by self I have received verbal consent from Ronnie Valiente to discuss any/all medical information while they are present in the room.

## 2022-10-22 ENCOUNTER — APPOINTMENT (OUTPATIENT)
Dept: GENERAL RADIOLOGY | Age: 72
End: 2022-10-22
Attending: EMERGENCY MEDICINE
Payer: MEDICARE

## 2022-10-22 ENCOUNTER — HOSPITAL ENCOUNTER (OUTPATIENT)
Age: 72
Setting detail: OBSERVATION
Discharge: HOME HEALTH | End: 2022-10-24
Attending: EMERGENCY MEDICINE | Admitting: INTERNAL MEDICINE
Payer: MEDICARE

## 2022-10-22 DIAGNOSIS — M54.50 CHRONIC LOW BACK PAIN, UNSPECIFIED BACK PAIN LATERALITY, UNSPECIFIED WHETHER SCIATICA PRESENT: ICD-10-CM

## 2022-10-22 DIAGNOSIS — R53.1 WEAKNESS: Primary | ICD-10-CM

## 2022-10-22 DIAGNOSIS — G89.29 CHRONIC LOW BACK PAIN, UNSPECIFIED BACK PAIN LATERALITY, UNSPECIFIED WHETHER SCIATICA PRESENT: ICD-10-CM

## 2022-10-22 PROBLEM — M54.59 INTRACTABLE LOW BACK PAIN: Status: ACTIVE | Noted: 2022-10-22

## 2022-10-22 LAB
ALBUMIN SERPL-MCNC: 3.7 G/DL (ref 3.5–5)
ALBUMIN/GLOB SERPL: 1.2 {RATIO} (ref 1.1–2.2)
ALP SERPL-CCNC: 82 U/L (ref 45–117)
ALT SERPL-CCNC: 26 U/L (ref 12–78)
ANION GAP SERPL CALC-SCNC: 9 MMOL/L (ref 5–15)
APPEARANCE UR: CLEAR
AST SERPL-CCNC: 48 U/L (ref 15–37)
BASOPHILS # BLD: 0 K/UL (ref 0–0.1)
BASOPHILS NFR BLD: 0 % (ref 0–1)
BILIRUB SERPL-MCNC: 1 MG/DL (ref 0.2–1)
BILIRUB UR QL: NEGATIVE
BUN SERPL-MCNC: 32 MG/DL (ref 6–20)
BUN/CREAT SERPL: 25 (ref 12–20)
CALCIUM SERPL-MCNC: 8.4 MG/DL (ref 8.5–10.1)
CHLORIDE SERPL-SCNC: 101 MMOL/L (ref 97–108)
CO2 SERPL-SCNC: 27 MMOL/L (ref 21–32)
COLOR UR: ABNORMAL
COMMENT, HOLDF: NORMAL
COMMENT, HOLDF: NORMAL
CREAT SERPL-MCNC: 1.26 MG/DL (ref 0.7–1.3)
DIFFERENTIAL METHOD BLD: ABNORMAL
EOSINOPHIL # BLD: 0 K/UL (ref 0–0.4)
EOSINOPHIL NFR BLD: 0 % (ref 0–7)
ERYTHROCYTE [DISTWIDTH] IN BLOOD BY AUTOMATED COUNT: 12.8 % (ref 11.5–14.5)
GLOBULIN SER CALC-MCNC: 3.1 G/DL (ref 2–4)
GLUCOSE SERPL-MCNC: 69 MG/DL (ref 65–100)
GLUCOSE UR STRIP.AUTO-MCNC: NEGATIVE MG/DL
HCT VFR BLD AUTO: 41.6 % (ref 36.6–50.3)
HGB BLD-MCNC: 13.6 G/DL (ref 12.1–17)
HGB UR QL STRIP: NEGATIVE
IMM GRANULOCYTES # BLD AUTO: 0 K/UL (ref 0–0.04)
IMM GRANULOCYTES NFR BLD AUTO: 0 % (ref 0–0.5)
KETONES UR QL STRIP.AUTO: 80 MG/DL
LEUKOCYTE ESTERASE UR QL STRIP.AUTO: NEGATIVE
LYMPHOCYTES # BLD: 1.1 K/UL (ref 0.8–3.5)
LYMPHOCYTES NFR BLD: 8 % (ref 12–49)
MCH RBC QN AUTO: 31.2 PG (ref 26–34)
MCHC RBC AUTO-ENTMCNC: 32.7 G/DL (ref 30–36.5)
MCV RBC AUTO: 95.4 FL (ref 80–99)
MONOCYTES # BLD: 1.1 K/UL (ref 0–1)
MONOCYTES NFR BLD: 8 % (ref 5–13)
NEUTS SEG # BLD: 11.2 K/UL (ref 1.8–8)
NEUTS SEG NFR BLD: 84 % (ref 32–75)
NITRITE UR QL STRIP.AUTO: NEGATIVE
NRBC # BLD: 0 K/UL (ref 0–0.01)
NRBC BLD-RTO: 0 PER 100 WBC
PH UR STRIP: 5 [PH] (ref 5–8)
PLATELET # BLD AUTO: 189 K/UL (ref 150–400)
PMV BLD AUTO: 11.8 FL (ref 8.9–12.9)
POTASSIUM SERPL-SCNC: 4.1 MMOL/L (ref 3.5–5.1)
PROT SERPL-MCNC: 6.8 G/DL (ref 6.4–8.2)
PROT UR STRIP-MCNC: NEGATIVE MG/DL
RBC # BLD AUTO: 4.36 M/UL (ref 4.1–5.7)
SAMPLES BEING HELD,HOLD: NORMAL
SAMPLES BEING HELD,HOLD: NORMAL
SODIUM SERPL-SCNC: 137 MMOL/L (ref 136–145)
SP GR UR REFRACTOMETRY: 1.02 (ref 1–1.03)
UROBILINOGEN UR QL STRIP.AUTO: 0.2 EU/DL (ref 0.2–1)
WBC # BLD AUTO: 13.4 K/UL (ref 4.1–11.1)

## 2022-10-22 PROCEDURE — 74011000250 HC RX REV CODE- 250: Performed by: INTERNAL MEDICINE

## 2022-10-22 PROCEDURE — 80053 COMPREHEN METABOLIC PANEL: CPT

## 2022-10-22 PROCEDURE — 85025 COMPLETE CBC W/AUTO DIFF WBC: CPT

## 2022-10-22 PROCEDURE — 99285 EMERGENCY DEPT VISIT HI MDM: CPT

## 2022-10-22 PROCEDURE — 74011250637 HC RX REV CODE- 250/637: Performed by: EMERGENCY MEDICINE

## 2022-10-22 PROCEDURE — 96372 THER/PROPH/DIAG INJ SC/IM: CPT

## 2022-10-22 PROCEDURE — G0378 HOSPITAL OBSERVATION PER HR: HCPCS

## 2022-10-22 PROCEDURE — 74011250636 HC RX REV CODE- 250/636: Performed by: EMERGENCY MEDICINE

## 2022-10-22 PROCEDURE — 36415 COLL VENOUS BLD VENIPUNCTURE: CPT

## 2022-10-22 PROCEDURE — 74011250637 HC RX REV CODE- 250/637: Performed by: INTERNAL MEDICINE

## 2022-10-22 PROCEDURE — 71045 X-RAY EXAM CHEST 1 VIEW: CPT

## 2022-10-22 PROCEDURE — 81003 URINALYSIS AUTO W/O SCOPE: CPT

## 2022-10-22 RX ORDER — TOPIRAMATE 25 MG/1
50 TABLET ORAL 2 TIMES DAILY
Status: DISCONTINUED | OUTPATIENT
Start: 2022-10-22 | End: 2022-10-24 | Stop reason: HOSPADM

## 2022-10-22 RX ORDER — POLYETHYLENE GLYCOL 3350 17 G/17G
17 POWDER, FOR SOLUTION ORAL DAILY PRN
Status: DISCONTINUED | OUTPATIENT
Start: 2022-10-22 | End: 2022-10-23

## 2022-10-22 RX ORDER — SODIUM CHLORIDE 0.9 % (FLUSH) 0.9 %
5-40 SYRINGE (ML) INJECTION EVERY 8 HOURS
Status: DISCONTINUED | OUTPATIENT
Start: 2022-10-22 | End: 2022-10-24 | Stop reason: HOSPADM

## 2022-10-22 RX ORDER — HYDROMORPHONE HYDROCHLORIDE 2 MG/1
4 TABLET ORAL
Status: DISCONTINUED | OUTPATIENT
Start: 2022-10-22 | End: 2022-10-24 | Stop reason: HOSPADM

## 2022-10-22 RX ORDER — HYDROCODONE BITARTRATE AND ACETAMINOPHEN 5; 325 MG/1; MG/1
2 TABLET ORAL ONCE
Status: COMPLETED | OUTPATIENT
Start: 2022-10-22 | End: 2022-10-22

## 2022-10-22 RX ORDER — LISINOPRIL 20 MG/1
40 TABLET ORAL DAILY
Status: DISCONTINUED | OUTPATIENT
Start: 2022-10-23 | End: 2022-10-24 | Stop reason: HOSPADM

## 2022-10-22 RX ORDER — CYCLOBENZAPRINE HCL 10 MG
10 TABLET ORAL
Qty: 15 TABLET | Refills: 0 | Status: SHIPPED | OUTPATIENT
Start: 2022-10-22 | End: 2022-10-27

## 2022-10-22 RX ORDER — METHADONE HYDROCHLORIDE 10 MG/1
10 TABLET ORAL 2 TIMES DAILY
Status: DISCONTINUED | OUTPATIENT
Start: 2022-10-22 | End: 2022-10-24 | Stop reason: HOSPADM

## 2022-10-22 RX ORDER — ACETAMINOPHEN 650 MG/1
650 SUPPOSITORY RECTAL
Status: DISCONTINUED | OUTPATIENT
Start: 2022-10-22 | End: 2022-10-24 | Stop reason: HOSPADM

## 2022-10-22 RX ORDER — PREDNISONE 50 MG/1
50 TABLET ORAL DAILY
Qty: 3 TABLET | Refills: 0 | Status: SHIPPED | OUTPATIENT
Start: 2022-10-22 | End: 2022-10-25

## 2022-10-22 RX ORDER — ONDANSETRON 4 MG/1
4 TABLET, ORALLY DISINTEGRATING ORAL
Status: DISCONTINUED | OUTPATIENT
Start: 2022-10-22 | End: 2022-10-24 | Stop reason: HOSPADM

## 2022-10-22 RX ORDER — CARVEDILOL 12.5 MG/1
25 TABLET ORAL 2 TIMES DAILY WITH MEALS
Status: DISCONTINUED | OUTPATIENT
Start: 2022-10-23 | End: 2022-10-24 | Stop reason: HOSPADM

## 2022-10-22 RX ORDER — SODIUM CHLORIDE 0.9 % (FLUSH) 0.9 %
5-40 SYRINGE (ML) INJECTION AS NEEDED
Status: DISCONTINUED | OUTPATIENT
Start: 2022-10-22 | End: 2022-10-24 | Stop reason: HOSPADM

## 2022-10-22 RX ORDER — ENOXAPARIN SODIUM 100 MG/ML
40 INJECTION SUBCUTANEOUS DAILY
Status: DISCONTINUED | OUTPATIENT
Start: 2022-10-23 | End: 2022-10-24 | Stop reason: HOSPADM

## 2022-10-22 RX ORDER — ACETAMINOPHEN 325 MG/1
650 TABLET ORAL
Status: DISCONTINUED | OUTPATIENT
Start: 2022-10-22 | End: 2022-10-24 | Stop reason: HOSPADM

## 2022-10-22 RX ORDER — DEXAMETHASONE SODIUM PHOSPHATE 10 MG/ML
10 INJECTION INTRAMUSCULAR; INTRAVENOUS
Status: COMPLETED | OUTPATIENT
Start: 2022-10-22 | End: 2022-10-22

## 2022-10-22 RX ORDER — TIZANIDINE 2 MG/1
4 TABLET ORAL
Status: DISCONTINUED | OUTPATIENT
Start: 2022-10-22 | End: 2022-10-24 | Stop reason: HOSPADM

## 2022-10-22 RX ORDER — ONDANSETRON 2 MG/ML
4 INJECTION INTRAMUSCULAR; INTRAVENOUS
Status: DISCONTINUED | OUTPATIENT
Start: 2022-10-22 | End: 2022-10-24 | Stop reason: HOSPADM

## 2022-10-22 RX ORDER — BUPROPION HYDROCHLORIDE 150 MG/1
300 TABLET ORAL DAILY
Status: DISCONTINUED | OUTPATIENT
Start: 2022-10-23 | End: 2022-10-24 | Stop reason: HOSPADM

## 2022-10-22 RX ORDER — PREGABALIN 75 MG/1
225 CAPSULE ORAL 2 TIMES DAILY
Status: DISCONTINUED | OUTPATIENT
Start: 2022-10-22 | End: 2022-10-24 | Stop reason: HOSPADM

## 2022-10-22 RX ADMIN — METHADONE HYDROCHLORIDE 10 MG: 5 TABLET ORAL at 21:40

## 2022-10-22 RX ADMIN — SODIUM CHLORIDE, PRESERVATIVE FREE 10 ML: 5 INJECTION INTRAVENOUS at 21:42

## 2022-10-22 RX ADMIN — POLYETHYLENE GLYCOL 3350 17 G: 17 POWDER, FOR SOLUTION ORAL at 21:40

## 2022-10-22 RX ADMIN — HYDROCODONE BITARTRATE AND ACETAMINOPHEN 2 TABLET: 5; 325 TABLET ORAL at 15:36

## 2022-10-22 RX ADMIN — TOPIRAMATE 50 MG: 25 TABLET, FILM COATED ORAL at 21:40

## 2022-10-22 RX ADMIN — PREGABALIN 225 MG: 75 CAPSULE ORAL at 21:40

## 2022-10-22 RX ADMIN — DEXAMETHASONE SODIUM PHOSPHATE 10 MG: 10 INJECTION, SOLUTION INTRAMUSCULAR; INTRAVENOUS at 15:44

## 2022-10-22 NOTE — DISCHARGE INSTRUCTIONS
Patient Discharge Instructions    Rosa Da Silva / [id number] : 1950    Admitted 10/22/2022 Discharged: 10/24/2022     Primary Diagnoses  @Rprob@    Take Home Medications     It is important that you take the medication exactly as they are prescribed. Keep your medication in the bottles provided by the pharmacist and keep a list of the medication names, dosages, and times to be taken in your wallet. Do not take other medications without consulting your doctor. What to do at Home    Recommended diet: Regular Diet    Recommended activity: Activity as tolerated and PT/OT per Home Health    If you experience worse pain, please follow up with orthopedics. Follow-up with your PCP in a few weeks    [unfilled]     Information obtained by :  I understand that if any problems occur once I am at home I am to contact my physician. I understand and acknowledge receipt of the instructions indicated above.                                                                                                                                            Physician's or R.N.'s Signature                                                                  Date/Time                                                                                                                                              Patient or Representative Signature                                                          Date/Time

## 2022-10-22 NOTE — ED PROVIDER NOTES
76-year-old male with chronic back pain and spinal stenosis presents today with worsening pain. Pain makes it hard for him to ambulate. He is not fallen or injured himself. He has no bowel or bladder incontinence or retention. He is otherwise stable in the ER. The history is provided by the patient. Nausea   This is a chronic problem. The current episode started more than 1 week ago. The problem has not changed since onset. There has been no fever. Pertinent negatives include no chills, no fever, no sweats, no abdominal pain, no diarrhea, no headaches, no myalgias, no cough and no headaches. His pertinent negatives include no irritable bowel syndrome, no inflammatory bowel disease, no short gut syndrome, no bowel resection, no recent abdominal surgery, no malabsorption,  and no DM. Extremity Weakness   This is a chronic problem. The problem occurs constantly. The problem has not changed since onset. The pain is moderate. Pertinent negatives include no numbness, full range of motion, no stiffness, no tingling, no itching and no back pain. He has tried nothing for the symptoms. There has been no history of extremity trauma.       Past Medical History:   Diagnosis Date    Abscess 01/14/2019    Anemia NEC     Asthma     Chronic pain     Due to spinal stenosis    Constipation     HTN (hypertension)     Hypertension     Hypogonadism male     Psychiatric disorder     major depressive disorder    Sleep trouble     Spinal stenosis     Cervical and Lumbar    Vertebral compression fracture (HCC)        Past Surgical History:   Procedure Laterality Date    HX BACK SURGERY      HX CATARACT REMOVAL Bilateral     HX CERVICAL LAMINECTOMY      HX CYST INCISION AND DRAINAGE  01/14/2019    Drain left lateral thigh abscess    HX TONSILLECTOMY           Family History:   Problem Relation Age of Onset    Heart Disease Father     Hypertension Father     Stroke Father        Social History     Socioeconomic History    Marital status:      Spouse name: Not on file    Number of children: Not on file    Years of education: Not on file    Highest education level: Not on file   Occupational History    Occupation: Retired   Tobacco Use    Smoking status: Never    Smokeless tobacco: Never   Substance and Sexual Activity    Alcohol use: No     Comment: Drank six weeks ago.  Drug use: Yes     Frequency: 7.0 times per week     Types: Marijuana    Sexual activity: Never   Other Topics Concern    Not on file   Social History Narrative    Lives in [facility]     Social Determinants of Health     Financial Resource Strain: Not on file   Food Insecurity: Not on file   Transportation Needs: Not on file   Physical Activity: Not on file   Stress: Not on file   Social Connections: Not on file   Intimate Partner Violence: Not on file   Housing Stability: Not on file         ALLERGIES: Amitriptyline, Neurontin [gabapentin], and Percocet [oxycodone-acetaminophen]    Review of Systems   Constitutional:  Negative for chills and fever. HENT:  Negative for congestion, rhinorrhea, sneezing and sore throat. Eyes:  Negative for redness and visual disturbance. Respiratory:  Negative for cough and shortness of breath. Cardiovascular:  Negative for chest pain and leg swelling. Gastrointestinal:  Positive for nausea. Negative for abdominal pain, diarrhea and vomiting. Genitourinary:  Negative for difficulty urinating and frequency. Musculoskeletal:  Negative for back pain, myalgias, neck stiffness and stiffness. Skin:  Negative for itching and rash. Neurological:  Positive for weakness. Negative for dizziness, tingling, syncope, numbness and headaches. Hematological:  Negative for adenopathy. All other systems reviewed and are negative.     Vitals:    10/22/22 1449   BP: 134/65   Pulse: 83   Resp: 14   Temp: 97.9 °F (36.6 °C)   SpO2: 94%   Weight: 102.1 kg (225 lb)   Height: 6' 7\" (2.007 m) Physical Exam  Vitals and nursing note reviewed. Constitutional:       Appearance: Normal appearance. He is well-developed. HENT:      Head: Normocephalic and atraumatic. Cardiovascular:      Rate and Rhythm: Normal rate and regular rhythm. Pulses: Normal pulses. Heart sounds: Normal heart sounds. Pulmonary:      Effort: Pulmonary effort is normal. No respiratory distress. Breath sounds: Normal breath sounds. Chest:      Chest wall: No tenderness. Abdominal:      General: Bowel sounds are normal.      Palpations: Abdomen is soft. Tenderness: There is no abdominal tenderness. There is no guarding or rebound. Musculoskeletal:      Cervical back: Full passive range of motion without pain, normal range of motion and neck supple. Skin:     General: Skin is warm and dry. Findings: No erythema or rash. Neurological:      Mental Status: He is alert and oriented to person, place, and time. Psychiatric:         Speech: Speech normal.         Behavior: Behavior normal.         Thought Content: Thought content normal.         Judgment: Judgment normal.        MDM  Number of Diagnoses or Management Options  Chronic low back pain, unspecified back pain laterality, unspecified whether sciatica present  Weakness  Diagnosis management comments: Chronic back with current flareup. Patient is otherwise stable in the ER. We will give pain medicines and steroids today. ED Course as of 10/22/22 2003   Sat Oct 22, 2022   1758 Patient did well in the emergency department with hydrocodone and dexamethasone injection. He was able to ambulate well in the ER and is suitable for discharge to home. [VM]   1923 On repeat attempt to ambulate patient stumbled and was very weak. He is very concerned about falling again in his home and states he cannot walk at this time.  [VM]      ED Course User Index  [VM] Marcela Preston MD     Recent Results (from the past 24 hour(s))   SAMPLES BEING HELD    Collection Time: 10/22/22  3:47 PM   Result Value Ref Range    SAMPLES BEING HELD 1lav,1red,1dk grn,1blue,1pst,1ssy     COMMENT        Add-on orders for these samples will be processed based on acceptable specimen integrity and analyte stability, which may vary by analyte. CBC WITH AUTOMATED DIFF    Collection Time: 10/22/22  3:47 PM   Result Value Ref Range    WBC 13.4 (H) 4.1 - 11.1 K/uL    RBC 4.36 4.10 - 5.70 M/uL    HGB 13.6 12.1 - 17.0 g/dL    HCT 41.6 36.6 - 50.3 %    MCV 95.4 80.0 - 99.0 FL    MCH 31.2 26.0 - 34.0 PG    MCHC 32.7 30.0 - 36.5 g/dL    RDW 12.8 11.5 - 14.5 %    PLATELET 978 126 - 196 K/uL    MPV 11.8 8.9 - 12.9 FL    NRBC 0.0 0  WBC    ABSOLUTE NRBC 0.00 0.00 - 0.01 K/uL    NEUTROPHILS 84 (H) 32 - 75 %    LYMPHOCYTES 8 (L) 12 - 49 %    MONOCYTES 8 5 - 13 %    EOSINOPHILS 0 0 - 7 %    BASOPHILS 0 0 - 1 %    IMMATURE GRANULOCYTES 0 0.0 - 0.5 %    ABS. NEUTROPHILS 11.2 (H) 1.8 - 8.0 K/UL    ABS. LYMPHOCYTES 1.1 0.8 - 3.5 K/UL    ABS. MONOCYTES 1.1 (H) 0.0 - 1.0 K/UL    ABS. EOSINOPHILS 0.0 0.0 - 0.4 K/UL    ABS. BASOPHILS 0.0 0.0 - 0.1 K/UL    ABS. IMM. GRANS. 0.0 0.00 - 0.04 K/UL    DF AUTOMATED     METABOLIC PANEL, COMPREHENSIVE    Collection Time: 10/22/22  3:47 PM   Result Value Ref Range    Sodium 137 136 - 145 mmol/L    Potassium 4.1 3.5 - 5.1 mmol/L    Chloride 101 97 - 108 mmol/L    CO2 27 21 - 32 mmol/L    Anion gap 9 5 - 15 mmol/L    Glucose 69 65 - 100 mg/dL    BUN 32 (H) 6 - 20 MG/DL    Creatinine 1.26 0.70 - 1.30 MG/DL    BUN/Creatinine ratio 25 (H) 12 - 20      eGFR >60 >60 ml/min/1.73m2    Calcium 8.4 (L) 8.5 - 10.1 MG/DL    Bilirubin, total 1.0 0.2 - 1.0 MG/DL    ALT (SGPT) 26 12 - 78 U/L    AST (SGOT) 48 (H) 15 - 37 U/L    Alk.  phosphatase 82 45 - 117 U/L    Protein, total 6.8 6.4 - 8.2 g/dL    Albumin 3.7 3.5 - 5.0 g/dL    Globulin 3.1 2.0 - 4.0 g/dL    A-G Ratio 1.2 1.1 - 2.2     URINALYSIS W/ RFLX MICROSCOPIC    Collection Time: 10/22/22  6:31 PM   Result Value Ref Range    Color YELLOW/STRAW      Appearance CLEAR CLEAR      Specific gravity 1.020 1.003 - 1.030      pH (UA) 5.0 5.0 - 8.0      Protein Negative NEG mg/dL    Glucose Negative NEG mg/dL    Ketone 80 (A) NEG mg/dL    Bilirubin Negative NEG      Blood Negative NEG      Urobilinogen 0.2 0.2 - 1.0 EU/dL    Nitrites Negative NEG      Leukocyte Esterase Negative NEG     SAMPLES BEING HELD    Collection Time: 10/22/22  6:31 PM   Result Value Ref Range    SAMPLES BEING HELD 1UC     COMMENT        Add-on orders for these samples will be processed based on acceptable specimen integrity and analyte stability, which may vary by analyte. Procedures    Perfect Serve Consult for Admission  7:23 PM    ED Room Number: WI17/40  Patient Name and age:  Jennifer Wilkins 71 y.o.  male  Working Diagnosis:   1. Weakness    2. Chronic low back pain, unspecified back pain laterality, unspecified whether sciatica present        COVID-19 Suspicion:  no  Sepsis present:  no  Reassessment needed: no  Code Status:  Full Code  Readmission: no  Isolation Requirements:  no  Recommended Level of Care:  med/surg  Department:[facility] ED - [phone]  Other: 78-year-old male with generalized weakness and unable to walk. Multiple falls in his home in the past 3 days with no injuries.

## 2022-10-22 NOTE — ED TRIAGE NOTES
Patient arrives to ED with c/o increased weakness. Patient usually walks around the house and cares for self most of the day. Has an aid that comes in the morning to clean. However he last 3 days he has had difficulty walking and has had to lower himself to the floor because he is too weak. Patient had x1 episode of vomiting \"dark\" emesis yesterday and is slightly nauseous.

## 2022-10-23 ENCOUNTER — APPOINTMENT (OUTPATIENT)
Dept: MRI IMAGING | Age: 72
End: 2022-10-23
Attending: PHYSICIAN ASSISTANT
Payer: MEDICARE

## 2022-10-23 PROBLEM — F32.A ANXIETY AND DEPRESSION: Status: ACTIVE | Noted: 2022-10-23

## 2022-10-23 PROBLEM — M17.0 PRIMARY OSTEOARTHRITIS OF BOTH KNEES: Status: RESOLVED | Noted: 2019-02-01 | Resolved: 2022-10-23

## 2022-10-23 PROBLEM — Z79.899 POLYPHARMACY: Status: ACTIVE | Noted: 2022-10-23

## 2022-10-23 PROBLEM — M48.02 CERVICAL STENOSIS OF SPINE: Status: ACTIVE | Noted: 2022-10-23

## 2022-10-23 PROBLEM — K08.9 POOR DENTITION: Status: RESOLVED | Noted: 2019-02-01 | Resolved: 2022-10-23

## 2022-10-23 PROBLEM — F41.9 ANXIETY DISORDER: Status: RESOLVED | Noted: 2019-02-01 | Resolved: 2022-10-23

## 2022-10-23 PROBLEM — R11.0 NAUSEA: Status: ACTIVE | Noted: 2022-10-23

## 2022-10-23 PROBLEM — L02.91 ABSCESS: Status: RESOLVED | Noted: 2019-01-14 | Resolved: 2022-10-23

## 2022-10-23 PROBLEM — F41.9 ANXIETY AND DEPRESSION: Status: ACTIVE | Noted: 2022-10-23

## 2022-10-23 PROBLEM — F11.90 CHRONIC, CONTINUOUS USE OF OPIOIDS: Status: ACTIVE | Noted: 2022-10-23

## 2022-10-23 PROBLEM — E86.0 DEHYDRATION: Status: ACTIVE | Noted: 2022-10-23

## 2022-10-23 PROBLEM — I10 HYPERTENSION: Status: ACTIVE | Noted: 2022-10-23

## 2022-10-23 PROBLEM — L03.90 CELLULITIS: Status: RESOLVED | Noted: 2019-01-13 | Resolved: 2022-10-23

## 2022-10-23 PROBLEM — M48.061 LUMBAR SPINAL STENOSIS: Status: ACTIVE | Noted: 2022-10-23

## 2022-10-23 PROCEDURE — G0378 HOSPITAL OBSERVATION PER HR: HCPCS

## 2022-10-23 PROCEDURE — 96372 THER/PROPH/DIAG INJ SC/IM: CPT

## 2022-10-23 PROCEDURE — 96374 THER/PROPH/DIAG INJ IV PUSH: CPT

## 2022-10-23 PROCEDURE — 74011250636 HC RX REV CODE- 250/636: Performed by: PHYSICIAN ASSISTANT

## 2022-10-23 PROCEDURE — 74011250636 HC RX REV CODE- 250/636: Performed by: INTERNAL MEDICINE

## 2022-10-23 PROCEDURE — 74011250636 HC RX REV CODE- 250/636

## 2022-10-23 PROCEDURE — 72148 MRI LUMBAR SPINE W/O DYE: CPT

## 2022-10-23 PROCEDURE — 72141 MRI NECK SPINE W/O DYE: CPT

## 2022-10-23 PROCEDURE — 74011250637 HC RX REV CODE- 250/637: Performed by: INTERNAL MEDICINE

## 2022-10-23 PROCEDURE — 96375 TX/PRO/DX INJ NEW DRUG ADDON: CPT

## 2022-10-23 PROCEDURE — 74011000250 HC RX REV CODE- 250: Performed by: INTERNAL MEDICINE

## 2022-10-23 PROCEDURE — 96376 TX/PRO/DX INJ SAME DRUG ADON: CPT

## 2022-10-23 RX ORDER — LORAZEPAM 2 MG/ML
1 INJECTION INTRAMUSCULAR ONCE
Status: COMPLETED | OUTPATIENT
Start: 2022-10-23 | End: 2022-10-23

## 2022-10-23 RX ORDER — POLYETHYLENE GLYCOL 3350 17 G/17G
17 POWDER, FOR SOLUTION ORAL
Status: DISCONTINUED | OUTPATIENT
Start: 2022-10-23 | End: 2022-10-24 | Stop reason: HOSPADM

## 2022-10-23 RX ORDER — DEXAMETHASONE SODIUM PHOSPHATE 4 MG/ML
8 INJECTION, SOLUTION INTRA-ARTICULAR; INTRALESIONAL; INTRAMUSCULAR; INTRAVENOUS; SOFT TISSUE EVERY 8 HOURS
Status: COMPLETED | OUTPATIENT
Start: 2022-10-23 | End: 2022-10-24

## 2022-10-23 RX ORDER — LORAZEPAM 2 MG/ML
INJECTION INTRAMUSCULAR
Status: COMPLETED
Start: 2022-10-23 | End: 2022-10-23

## 2022-10-23 RX ORDER — SENNOSIDES 8.6 MG/1
1 TABLET ORAL DAILY
Status: DISCONTINUED | OUTPATIENT
Start: 2022-10-23 | End: 2022-10-24 | Stop reason: HOSPADM

## 2022-10-23 RX ADMIN — METHADONE HYDROCHLORIDE 10 MG: 5 TABLET ORAL at 09:22

## 2022-10-23 RX ADMIN — SODIUM CHLORIDE, PRESERVATIVE FREE 10 ML: 5 INJECTION INTRAVENOUS at 06:55

## 2022-10-23 RX ADMIN — HYDROMORPHONE HYDROCHLORIDE 4 MG: 2 TABLET ORAL at 15:40

## 2022-10-23 RX ADMIN — LORAZEPAM 1 MG: 2 INJECTION INTRAMUSCULAR; INTRAVENOUS at 18:08

## 2022-10-23 RX ADMIN — POLYETHYLENE GLYCOL 3350 17 G: 17 POWDER, FOR SOLUTION ORAL at 18:09

## 2022-10-23 RX ADMIN — DEXAMETHASONE SODIUM PHOSPHATE 8 MG: 4 INJECTION, SOLUTION INTRAMUSCULAR; INTRAVENOUS at 21:54

## 2022-10-23 RX ADMIN — BUPROPION HYDROCHLORIDE 300 MG: 150 TABLET, EXTENDED RELEASE ORAL at 09:21

## 2022-10-23 RX ADMIN — TOPIRAMATE 50 MG: 25 TABLET, FILM COATED ORAL at 09:21

## 2022-10-23 RX ADMIN — TOPIRAMATE 50 MG: 25 TABLET, FILM COATED ORAL at 21:54

## 2022-10-23 RX ADMIN — SODIUM CHLORIDE, PRESERVATIVE FREE 10 ML: 5 INJECTION INTRAVENOUS at 13:22

## 2022-10-23 RX ADMIN — ENOXAPARIN SODIUM 40 MG: 100 INJECTION SUBCUTANEOUS at 09:24

## 2022-10-23 RX ADMIN — METHADONE HYDROCHLORIDE 10 MG: 5 TABLET ORAL at 21:54

## 2022-10-23 RX ADMIN — POLYETHYLENE GLYCOL 3350 17 G: 17 POWDER, FOR SOLUTION ORAL at 13:13

## 2022-10-23 RX ADMIN — LORAZEPAM 1 MG: 2 INJECTION INTRAMUSCULAR at 18:08

## 2022-10-23 RX ADMIN — PREGABALIN 225 MG: 75 CAPSULE ORAL at 21:54

## 2022-10-23 RX ADMIN — PREGABALIN 225 MG: 75 CAPSULE ORAL at 09:23

## 2022-10-23 RX ADMIN — LISINOPRIL 40 MG: 20 TABLET ORAL at 09:22

## 2022-10-23 RX ADMIN — NALOXEGOL OXALATE 12.5 MG: 12.5 TABLET, FILM COATED ORAL at 09:25

## 2022-10-23 RX ADMIN — CARVEDILOL 25 MG: 12.5 TABLET, FILM COATED ORAL at 18:09

## 2022-10-23 RX ADMIN — CARVEDILOL 25 MG: 12.5 TABLET, FILM COATED ORAL at 09:22

## 2022-10-23 RX ADMIN — DEXAMETHASONE SODIUM PHOSPHATE 8 MG: 4 INJECTION, SOLUTION INTRAMUSCULAR; INTRAVENOUS at 13:13

## 2022-10-23 RX ADMIN — SODIUM CHLORIDE, PRESERVATIVE FREE 10 ML: 5 INJECTION INTRAVENOUS at 21:54

## 2022-10-23 RX ADMIN — HYDROMORPHONE HYDROCHLORIDE 4 MG: 2 TABLET ORAL at 20:35

## 2022-10-23 RX ADMIN — SENNOSIDES 8.6 MG: 8.6 TABLET, FILM COATED ORAL at 09:23

## 2022-10-23 NOTE — H&P
History and Physical  NAME: Susan Andrews  MRN: [id number]  YOB: 1950  AGE: 71 y.o.  male  SOCIAL SECURITY NUMBER: [id number]  Primary Care Provider: Landy Fox MD  CODE STATUS: Full Code      ASSESSMENT/PLAN:  Bilateral lower extremity weakness. Suspect that this may be due to lumbar spinal stenosis. We will request lumbar MRI. Chronic pain. Continue home regimen of methadone 10 mg p.o. twice daily; instead pf hydrocodone/acetaminophen 10/325 mg,  dilaudid 4 mg  p.o. is being ordered as every 4 hours as needed. Frequent falls. Request physical therapy consultation. Lumbar and cervical spinal stenosis. Anxiety. Continue home medication of Ativan 0.5 mg p.o. 3 times daily as needed. Polypharmacy. History of Presenting Illness:   Susan Andrews is a 71 y.o. male  with a history of lumbar spinal stenosis as well as chronic neck and shoulder pain, who is followed by pain management specialist, Dr. Shweta Talley. Patient lives by himself and is independent in the activities of daily life at baseline. He states that he did have a walker and a cane at home, but at baseline, would not use them inside the house. He states that he was on methadone 10 mg twice a day plus hydrocodone 10/325 mg PO five times a day before until 4-5 months ago when this was went down to hydrocodone [id number] times a day plus the methadone 10 mg or twice a day. Patient states that he has noticed decreased mobility since then as his pain is not well-controlled. However, the last two days, this has gotten drastically worse to the point where he started using his walker inside the house. Prior to that, patient would only use his walker if he was going outside, for doctor's appointments and such. Patient states that during each of these falls, he gently laid himself down on the ground instead of having hard impact.  He states that hes like just would feel very weak, and he wouldnt hold onto something and gently lower himself down. He denies head injury or loss of consciousness. Patient states that he had three falls in the past few days and then decided to use his walker, and even with using his walker, he had another fall. The patient states that the legs have been feeling very heavy in addition to the low back pain. Patient states that he has actually been feeling weak all over, and also has the shoulder and neck pain. Patient states that he has never had symptoms like this before except a few years ago for which he was hospitalized. It was determined that he had bad arthritis of the knee requiring knee replacement, and this was leading to frequent falls and generalized weakness. Patient states that he takes meclizine daily for lightheadedness. He states that it is not vertiginous dizziness. He states that meclizine as effective in treating his dizziness. Patient states that in addition to his opiate medication is being reduced, if you want to go, his lorazepam was reduced from 1 mg po three times a day to 0.5 mg pill three times a day. He states that he was also placed on BuSpar, but this did nothing to relieve his anxiety. He states that he gets anxious when his pain levels increase, and currently, the anxiety is not well managed. Patient follows with a psychiatrist.     Patient states that he has no trouble voiding urine. He denies any dysuria. Patient states that he is prone to constipation and takes movantik and also MiraLAX four times a day. Cervical spine spine MRI of 1/21/2015 showed C6-C7 severe central spinal canal stenosis, C5-C6 moderate central spinal canal stenosis, severe bilateral foraminal stenosis C3-C4 through C5-C6. Cord signal at C4 and C5-C6 representing cord contusion versus chronic myelomalacia. Review of Systems:  A comprehensive review of systems was negative except for that written in the History of Present Illness.      Past Medical History:   Diagnosis Date    Abscess 01/14/2019    Anemia NEC     Anxiety and depression     Asthma     Cervical stenosis of spine     Cervical spine spine MRI of 1/21/2015 showed C6-C7 severe central spinal canal stenosis, C5-C6 moderate central spinal canal stenosis, severe bilateral foraminal stenosis C3-C4 through C5-C6. Cord signal at C4 and C5-C6 representing cord contusion versus chronic myelomalacia. Chronic pain     Due to spinal stenosis    Constipation     Hypertension     Hypogonadism male     Lumbar spinal stenosis     Lumbar MRI of 1/21/2015 showed moderate spinal stenosis at L1-L2, severe spinal stenosis at L2-L3, L3-L4, L4-L5, no spinal stenosis at L5-S1. Major depressive disorder     Sleep trouble     Vertebral compression fracture ([address]         PAST SURGICAL HISTORY:   Past Surgical History:   Procedure Laterality Date    HX BACK SURGERY      HX CATARACT REMOVAL Bilateral     HX CERVICAL LAMINECTOMY      HX CYST INCISION AND DRAINAGE  01/14/2019    Drain left lateral thigh abscess    HX TONSILLECTOMY         Prior to Admission medications    Medication Sig Start Date End Date Taking? Authorizing Provider   cyclobenzaprine (FLEXERIL) 10 mg tablet Take 1 Tablet by mouth three (3) times daily as needed for Muscle Spasm(s) for up to 5 days. 10/22/22 10/27/22 Yes M[place], Anniece Phoenix, MD   predniSONE (DELTASONE) 50 mg tablet Take 1 Tablet by mouth daily for 3 days. 10/22/22 10/25/22 Yes Monseau, Anniece Phoenix, MD   MULTIVITAMIN PO Take  by mouth.     Provider, Historical   topiramate (TOPAMAX) 50 mg tablet Take 1 tab by mouth in PM x 2 weeks, then take 1 tab twice a day 9/26/22   Samy Craig MD   [facility] Lu[facility] Lock Syr-needle 3 mL 21 gauge x 1\" syrg USE AS DIRECTED EVERY 7 DAYS 9/15/22   Kenneth Gómez MD   testosterone cypionate (DEPOTESTOTERONE CYPIONATE) 200 mg/mL injection INJECT 0.5ML INTRAMUSCULARLY ONCE EVERY 7 DAYS 6/6/22   Kenneth Gómez MD   tiZANidine (ZANAFLEX) 4 mg tablet TAKE ONE TABLET BY MOUTH TWICE A DAY AS NEEDED FOR PAIN 7/8/20   Kirsten Liao MD   lisinopril (PRINIVIL, ZESTRIL) 40 mg tablet Take 1 Tab by mouth daily. 2/10/20   Kirsten Liao MD   diclofenac (VOLTAREN) 1 % gel APPLY 4 GRAMS TOPICALLY TO KNEES THREE TIMES A DAY 10/26/17   Provider, Historical   carvedilol (COREG) 25 mg tablet Take 1 Tab by mouth two (2) times daily (with meals). 7/26/19   Zak Mace MD   methadone (DOLOPHINE) 10 mg tablet Take 1 Tab by mouth two (2) times a day. Max Daily Amount: 20 mg. 1/24/19   Jax Oliva Primsukhwinder DUMONT, DO   pregabalin (LYRICA) 225 mg capsule Take 1 Cap by mouth two (2) times a day. Max Daily Amount: 450 mg. 1/24/19   Shasta Form B, DO   HYDROcodone-acetaminophen (NORCO) 5-325 mg per tablet Take 1 Tab by mouth every four (4) hours as needed. Max Daily Amount: 6 Tabs. 1/24/19   Shasta Form B, DO   buPROPion  mg Tb24 Take 450 mg by mouth every morning. 1/24/19   Edith Eddy MD       Allergies   Allergen Reactions    Amitriptyline Other (comments)     hallucinating    Neurontin [Gabapentin] Other (comments)     insomnia    Percocet [Oxycodone-Acetaminophen] Nausea Only and Anxiety     Pt states he can tolerate small doses at 10 mg a day        Family History   Problem Relation Age of Onset    Hypertension Mother     Heart Disease Father     Hypertension Father     Stroke Father         Social History     Tobacco Use    Smoking status: Never    Smokeless tobacco: Never   Substance Use Topics    Alcohol use: Not Currently     Comment: Patient reports previous social alcohol use, stopping in 2012.     Drug use: Yes     Frequency: 7.0 times per week     Types: Marijuana       Physical exam  Patient Vitals for the past 24 hrs:   BP Temp Pulse Resp SpO2   10/23/22 0515 107/64 -- 67 16 95 %   10/23/22 0445 111/62 -- 67 16 95 %   10/23/22 0430 113/64 -- -- -- 95 %   10/23/22 0415 111/62 -- -- -- 94 %   10/23/22 0400 114/65 -- -- -- 94 % 10/23/22 0230 132/67 -- 66 14 94 %   10/23/22 0141 129/66 -- 80 14 94 %   10/23/22 0100 (!) 113/52 -- 82 14 94 %   10/22/22 2311 138/68 -- 84 12 93 %   10/22/22 2305 -- -- 84 -- --   10/22/22 2126 132/73 -- 84 11 94 %   10/22/22 2056 -- -- -- -- 91 %   10/22/22 1702 123/70 -- 79 10 93 %   10/22/22 1649 116/64 -- 85 15 92 %   10/22/22 1449 134/65 97.9 °F (36.6 °C) 83 14 94 %   On room air  Estimated body mass index is 25.35 kg/m² as calculated from the following:    Height as of this encounter: 6' 7\" (2.007 m). Weight as of this encounter: 102.1 kg (225 lb). General: In no acute distress. Well developed, well nourished. Head: Normocephalic, atraumatic. Eyes: Anicteric sclera. PERRL. Extraocular muscles intact. ENT: External ears and nose appear normal.  Oral mucosa moist.  Neck: Supple. No jugular venous distention. Heart: Regular rate and rhythm. No murmurs appreciated. Chest: Symmetrical excursion. Clear to auscultation bilaterally. Abdomen: Soft, nontender. No abnormal distention. Bowel sounds are present throughout. Extremities: No gross deformities. No edema, no cyanosis. Feet are warm to touch. Neurological: At least 4/5 strength in the bilateral lower extremities, symmetrical.  5/5 strength in the bilateral upper extremities, symmetrical.  Alert, oriented X3. Skin: No jaundice. No rashes. Recent Results (from the past 24 hour(s))   SAMPLES BEING HELD    Collection Time: 10/22/22  3:47 PM   Result Value Ref Range    SAMPLES BEING HELD 1lav,1red,1dk grn,1blue,1pst,1ssy     COMMENT        Add-on orders for these samples will be processed based on acceptable specimen integrity and analyte stability, which may vary by analyte.    CBC WITH AUTOMATED DIFF    Collection Time: 10/22/22  3:47 PM   Result Value Ref Range    WBC 13.4 (H) 4.1 - 11.1 K/uL    RBC 4.36 4.10 - 5.70 M/uL    HGB 13.6 12.1 - 17.0 g/dL    HCT 41.6 36.6 - 50.3 %    MCV 95.4 80.0 - 99.0 FL    MCH 31.2 26.0 - 34.0 PG MCHC 32.7 30.0 - 36.5 g/dL    RDW 12.8 11.5 - 14.5 %    PLATELET 988 541 - 680 K/uL    MPV 11.8 8.9 - 12.9 FL    NRBC 0.0 0  WBC    ABSOLUTE NRBC 0.00 0.00 - 0.01 K/uL    NEUTROPHILS 84 (H) 32 - 75 %    LYMPHOCYTES 8 (L) 12 - 49 %    MONOCYTES 8 5 - 13 %    EOSINOPHILS 0 0 - 7 %    BASOPHILS 0 0 - 1 %    IMMATURE GRANULOCYTES 0 0.0 - 0.5 %    ABS. NEUTROPHILS 11.2 (H) 1.8 - 8.0 K/UL    ABS. LYMPHOCYTES 1.1 0.8 - 3.5 K/UL    ABS. MONOCYTES 1.1 (H) 0.0 - 1.0 K/UL    ABS. EOSINOPHILS 0.0 0.0 - 0.4 K/UL    ABS. BASOPHILS 0.0 0.0 - 0.1 K/UL    ABS. IMM. GRANS. 0.0 0.00 - 0.04 K/UL    DF AUTOMATED     METABOLIC PANEL, COMPREHENSIVE    Collection Time: 10/22/22  3:47 PM   Result Value Ref Range    Sodium 137 136 - 145 mmol/L    Potassium 4.1 3.5 - 5.1 mmol/L    Chloride 101 97 - 108 mmol/L    CO2 27 21 - 32 mmol/L    Anion gap 9 5 - 15 mmol/L    Glucose 69 65 - 100 mg/dL    BUN 32 (H) 6 - 20 MG/DL    Creatinine 1.26 0.70 - 1.30 MG/DL    BUN/Creatinine ratio 25 (H) 12 - 20      eGFR >60 >60 ml/min/1.73m2    Calcium 8.4 (L) 8.5 - 10.1 MG/DL    Bilirubin, total 1.0 0.2 - 1.0 MG/DL    ALT (SGPT) 26 12 - 78 U/L    AST (SGOT) 48 (H) 15 - 37 U/L    Alk.  phosphatase 82 45 - 117 U/L    Protein, total 6.8 6.4 - 8.2 g/dL    Albumin 3.7 3.5 - 5.0 g/dL    Globulin 3.1 2.0 - 4.0 g/dL    A-G Ratio 1.2 1.1 - 2.2     URINALYSIS W/ RFLX MICROSCOPIC    Collection Time: 10/22/22  6:31 PM   Result Value Ref Range    Color YELLOW/STRAW      Appearance CLEAR CLEAR      Specific gravity 1.020 1.003 - 1.030      pH (UA) 5.0 5.0 - 8.0      Protein Negative NEG mg/dL    Glucose Negative NEG mg/dL    Ketone 80 (A) NEG mg/dL    Bilirubin Negative NEG      Blood Negative NEG      Urobilinogen 0.2 0.2 - 1.0 EU/dL    Nitrites Negative NEG      Leukocyte Esterase Negative NEG     SAMPLES BEING HELD    Collection Time: 10/22/22  6:31 PM   Result Value Ref Range    SAMPLES BEING HELD 1UC     COMMENT        Add-on orders for these samples will be processed based on acceptable specimen integrity and analyte stability, which may vary by analyte. XR CHEST PORT  Narrative: EXAM: Portable CXR. 1835 hours. INDICATION: weakness    FINDINGS:  The lungs appear clear. Heart is normal in size. There is no pulmonary edema. There is no evident pneumothorax or pleural effusion. Impression: No Acute Disease.                    Signature:  Stanislaw Holliday, DO

## 2022-10-23 NOTE — PROGRESS NOTES
Spiritual Care Assessment/Progress Note  [address]      NAME: Gregg Sánchez      MRN: [id number]  AGE: 71 y.o.  SEX: male  Yarsani Affiliation: Cheondoism   Language: English     10/23/2022     Total Time (in minutes): 19     Spiritual Assessment begun in [facility] POST SURG ORT 1 through conversation with:         [x]Patient        [] Family    [] Friend(s)        Reason for Consult: Palliative Care, Initial/Spiritual Assessment     Spiritual beliefs: (Please include comment if needed)     [x] Identifies with a ahmet tradition:  Cheondoism based     [] Supported by a ahmet community:            [] Claims no spiritual orientation:           [] Seeking spiritual identity:                [] Adheres to an individual form of spirituality:           [] Not able to assess:                           Identified resources for coping:      [x] Prayer                               [] Music                  [] Guided Imagery     [] Family/friends                 [] Pet visits     [] Devotional reading                         [] Unknown     [] Other:                                               Interventions offered during this visit: (See comments for more details)    Patient Interventions: Affirmation of emotions/emotional suffering, Affirmation of ahmet, Iconic (affirming the presence of God/Higher Power), Prayer (assurance of), Prayer (actual), Initial/Spiritual assessment, patient floor           Plan of Care:     [] Support spiritual and/or cultural needs    [] Support AMD and/or advance care planning process      [] Support grieving process   [] Coordinate Rites and/or Rituals    [] Coordination with community clergy   [] No spiritual needs identified at this time   [] Detailed Plan of Care below (See Comments)  [] Make referral to Music Therapy  [] Make referral to Pet Therapy     [] Make referral to Addiction services  [] Make referral to [facility]  [] Make referral to Spiritual Care Partner  [] No future visits requested        [x] Contact Spiritual Care for further referrals     Comments: Initial spiritual assessment in 5 Med Surg. Reviewed chart prior to visit. Mr. Lennox Richmond welcomed me with a smile. He indicated he has strong ahmet in God. He shared his image of God is about love and support. He finds his strength in his beliefs. He indicated he has not had contact with family for a while. He indicated his good friends that were in [place] have . He requested prayer for being open to God's presence and direction. Provided spiritual presence and prayer. Contact Spiritual Care for any further referrals.   Maddison Tena., MS., [facility]  Page a  [phone]- Khurram Saenz ([phone])

## 2022-10-23 NOTE — ED NOTES
Bedside and Verbal shift change report given to Day Shift (oncoming nurse) by Janae Sexton RN (offgoing nurse). Report included the following information SBAR, ED Summary, MAR, and Cardiac Rhythm NSR .

## 2022-10-23 NOTE — PROGRESS NOTES
[facility]  [address], [facility] [address]  [phone]    Hospitalist Progress Note      NAME: Brittni Ziegler   :  1950  MRM:  [id number]    Date/Time of service 10/23/2022  10:25 AM          Assessment and Plan:     Weakness - POA, likely multifactorial due to pain, deconditioning, anxiety, dehydration and other. Fall precautions. PT OT eval. CM consult. Chronic pain / Cervical stenosis of spine / Lumbar spinal stenosis / Intractable low back pain - Consult Ortho to see if any acute inpatient imaging or surgical needs. Meds as below    Anxiety and depression / Fibromyalgia / Insomnia - Outpatient psych follow up. Continue topiramate and bupropion      Polypharmacy / Methadone use / Chronic narcotic dependence / Chronic prescription benzodiazepine use - Palliative care consult. Continue home methadone, add Lyrica, tizanidine, dilaudid. Holding norco, flexeril     Hypertension - Continue coreg and lisinopril    Constipation - Continue scheduled miralax and movantik    Gastroesophageal reflux disease / Nausea - POA, add PPI and prn zofran. Dehydration - POA due to poor PO intake. Hydrate. Hold NSAIDS    Leukocytosis - Presumed due to steroids. No sign of infection. Subjective:     Chief Complaint:  pain and weakness a bit better    ROS:  (bold if positive, if negative)    Tolerating minimal PT  Tolerating Diet        Objective:     Last 24hrs VS reviewed since prior progress note.  Most recent are:    Visit Vitals  BP (!) 149/71   Pulse 80   Temp 97.9 °F (36.6 °C)   Resp 14   Ht 6' 7\" (2.007 m)   Wt 102.1 kg (225 lb)   SpO2 97%   BMI 25.35 kg/m²     SpO2 Readings from Last 6 Encounters:   10/23/22 97%   22 96%   10/09/19 91%   19 94%   19 97%   19 95%        No intake or output data in the 24 hours ending 10/23/22 0749     Physical Exam:    Gen:  Well-developed, well-nourished, in mild acute distress  HEENT:  Pink conjunctivae, PERRL, hearing intact to voice, moist mucous membranes  Neck:  Supple, without masses, thyroid non-tender  Resp:  No accessory muscle use, clear breath sounds without wheezes rales or rhonchi  Card:  No murmurs, normal S1, S2 without thrills, bruits or peripheral edema  Abd:  Soft, non-tender, non-distended, normoactive bowel sounds are present, no mass  Lymph:  No cervical or inguinal adenopathy  Musc:  No cyanosis or clubbing  Skin:  No rashes or ulcers, skin turgor is good  Neuro:  Cranial nerves are grossly intact, general motor weakness, follows commands   Psych:   Moderat insight, oriented to person, place and time, alert    Telemetry reviewed:   normal sinus rhythm  __________________________________________________________________  Medications Reviewed: (see below)  Medications:     Current Facility-Administered Medications   Medication Dose Route Frequency    sodium chloride (NS) flush 5-40 mL  5-40 mL IntraVENous Q8H    sodium chloride (NS) flush 5-40 mL  5-40 mL IntraVENous PRN    acetaminophen (TYLENOL) tablet 650 mg  650 mg Oral Q6H PRN    Or    acetaminophen (TYLENOL) suppository 650 mg  650 mg Rectal Q6H PRN    polyethylene glycol (MIRALAX) packet 17 g  17 g Oral DAILY PRN    ondansetron (ZOFRAN ODT) tablet 4 mg  4 mg Oral Q8H PRN    Or    ondansetron (ZOFRAN) injection 4 mg  4 mg IntraVENous Q6H PRN    enoxaparin (LOVENOX) injection 40 mg  40 mg SubCUTAneous DAILY    carvediloL (COREG) tablet 25 mg  25 mg Oral BID WITH MEALS    lisinopriL (PRINIVIL, ZESTRIL) tablet 40 mg  40 mg Oral DAILY    methadone (DOLOPHINE) tablet 10 mg  10 mg Oral BID    pregabalin (LYRICA) capsule 225 mg  225 mg Oral BID    tiZANidine (ZANAFLEX) tablet 4 mg  4 mg Oral TID PRN    topiramate (TOPAMAX) tablet 50 mg  50 mg Oral BID    buPROPion XL (WELLBUTRIN XL) tablet 300 mg  300 mg Oral DAILY    HYDROmorphone (DILAUDID) tablet 4 mg  4 mg Oral Q4H PRN     Current Outpatient Medications   Medication Sig cyclobenzaprine (FLEXERIL) 10 mg tablet Take 1 Tablet by mouth three (3) times daily as needed for Muscle Spasm(s) for up to 5 days. predniSONE (DELTASONE) 50 mg tablet Take 1 Tablet by mouth daily for 3 days. MULTIVITAMIN PO Take  by mouth. topiramate (TOPAMAX) 50 mg tablet Take 1 tab by mouth in PM x 2 weeks, then take 1 tab twice a day    [employer] Luer Lock Syr-needle 3 mL 21 gauge x 1\" syrg USE AS DIRECTED EVERY 7 DAYS    testosterone cypionate (DEPOTESTOTERONE CYPIONATE) 200 mg/mL injection INJECT 0.5ML INTRAMUSCULARLY ONCE EVERY 7 DAYS    tiZANidine (ZANAFLEX) 4 mg tablet TAKE ONE TABLET BY MOUTH TWICE A DAY AS NEEDED FOR PAIN    lisinopril (PRINIVIL, ZESTRIL) 40 mg tablet Take 1 Tab by mouth daily. diclofenac (VOLTAREN) 1 % gel APPLY 4 GRAMS TOPICALLY TO KNEES THREE TIMES A DAY    carvedilol (COREG) 25 mg tablet Take 1 Tab by mouth two (2) times daily (with meals). methadone (DOLOPHINE) 10 mg tablet Take 1 Tab by mouth two (2) times a day. Max Daily Amount: 20 mg.    pregabalin (LYRICA) 225 mg capsule Take 1 Cap by mouth two (2) times a day. Max Daily Amount: 450 mg. HYDROcodone-acetaminophen (NORCO) 5-325 mg per tablet Take 1 Tab by mouth every four (4) hours as needed. Max Daily Amount: 6 Tabs. buPROPion  mg Tb24 Take 450 mg by mouth every morning. Lab Data Reviewed: (see below)  Lab Review:     Recent Labs     10/22/22  1547   WBC 13.4*   HGB 13.6   HCT 41.6        Recent Labs     10/22/22  1547      K 4.1      CO2 27   GLU 69   BUN 32*   CREA 1.26   CA 8.4*   ALB 3.7   TBILI 1.0   ALT 26     Lab Results   Component Value Date/Time    Glucose (POC) 80 01/20/2015 08:00 AM    Glucose (POC) 96 01/19/2015 09:45 PM    Glucose (POC) 129 (H) 01/19/2015 04:25 PM    Glucose (POC) 110 01/19/2015 11:26 AM    Glucose (POC) 105 01/19/2015 08:07 AM     No results for input(s): PH, PCO2, PO2, HCO3, FIO2 in the last 72 hours.   No results for input(s): INR, INREXT in the last 72 hours. All Micro Results       None            Other pertinent lab: none    Total time spent with patient: 30 Minutes I personally reviewed chart, notes, data and current medications in the medical record. I have personally examined and treated the patient at bedside during this period. To assist coordination of care and communication with nursing and staff, this note may be preliminary early in the day, but finalized by end of the day.                  Care Plan discussed with: Patient, Care Manager, Nursing Staff, Consultant/Specialist, and >50% of time spent in counseling and coordination of care    Discussed:  Care Plan and D/C Planning    Prophylaxis:  Lovenox and H2B/PPI    Disposition:   PT, OT, RN           ___________________________________________________    Attending Physician: Brittny Murrell MD

## 2022-10-23 NOTE — CONSULTS
ORTHOPEDIC SURGERY CONSULT    Subjective:     Date of Consultation:  October 23, 2022    Referring Physician:  Dr. Doris Treviño is a 71 y.o. male who is being seen for debility, weakness, and progressive lower extremity proximal muscle weakness. Patient has a past medical history of anxiety, depression, cervical stenosis, lumbar stenosis, chronic lumbar pain, fibromyalgia, methadone use, HTN, GERD, and insomnia. He presented to the [facility] yesterday via EMS after experiencing worsening LE weakness and debility for the past 2 days. Patient has a known history of cervical and lumbar stenosis. He states that he has not seen a spine surgeon since his posterior approach decompression, but has imaging from 2015 that showed severe central stenosis of the cervical spine. He states that after 2015 he was told her was not a surgical candidate, but does not know why. He denies increasing bowel or bladder incontinence, urinary retention or hesitancy. He states the weakness in his legs has worsened and is utilizing a cane or rolling walker at this point. He reports pain in his lumbar spine, but denies radicular symptoms. He has not been on PO steroids prior to admission. History of posterior cervical decompression.      Patient Active Problem List    Diagnosis Date Noted    Nausea 10/23/2022    Chronic, continuous use of opioids 10/23/2022    Hypertension 10/23/2022    Polypharmacy 10/23/2022    Cervical stenosis of spine 10/23/2022    Anxiety and depression 10/23/2022    Lumbar spinal stenosis 10/23/2022    Dehydration 10/23/2022    Intractable low back pain 10/22/2022    Chronic narcotic dependence ([facility] [address] 10/09/2019    Chronic prescription benzodiazepine use 10/09/2019    Methadone use 02/03/2019    Weakness 01/17/2015    Fibromyalgia 10/31/2012    Gastroesophageal reflux disease without esophagitis 10/31/2012    Insomnia 10/31/2012    Spinal stenosis 10/31/2012    Constipation     Chronic pain      Family History   Problem Relation Age of Onset    Hypertension Mother     Heart Disease Father     Hypertension Father     Stroke Father       Social History     Tobacco Use    Smoking status: Never    Smokeless tobacco: Never   Substance Use Topics    Alcohol use: Not Currently     Comment: Patient reports previous social alcohol use, stopping in 2012. Past Medical History:   Diagnosis Date    Anemia NEC     Anxiety and depression     Asthma     Cervical stenosis of spine     Chronic pain     Chronic prescription benzodiazepine use     Chronic, continuous use of opioids     Constipation     Hypertension     Hypogonadism male     Insomnia     Lumbar spinal stenosis     Polypharmacy     Vertebral compression fracture (HCC)       Past Surgical History:   Procedure Laterality Date    HX BACK SURGERY      HX CATARACT REMOVAL Bilateral     HX CERVICAL LAMINECTOMY      HX CYST INCISION AND DRAINAGE  01/14/2019    Drain left lateral thigh abscess    HX TONSILLECTOMY        Prior to Admission medications    Medication Sig Start Date End Date Taking? Authorizing Provider   cyclobenzaprine (FLEXERIL) 10 mg tablet Take 1 Tablet by mouth three (3) times daily as needed for Muscle Spasm(s) for up to 5 days. 10/22/22 10/27/22 Yes Mike Preston MD   predniSONE (DELTASONE) 50 mg tablet Take 1 Tablet by mouth daily for 3 days. 10/22/22 10/25/22 Yes Mike Preston MD   MULTIVITAMIN PO Take  by mouth.    Yes Provider, Historical   topiramate (TOPAMAX) 50 mg tablet Take 1 tab by mouth in PM x 2 weeks, then take 1 tab twice a day 9/26/22  Yes Shayy Gardner MD   Carepoint Luer Lock Syr-needle 3 mL 21 gauge x 1\" syrg USE AS DIRECTED EVERY 7 DAYS 9/15/22  Yes Olman Wiley MD   testosterone cypionate (DEPOTESTOTERONE CYPIONATE) 200 mg/mL injection INJECT 0.5ML INTRAMUSCULARLY ONCE EVERY 7 DAYS 6/6/22  Yes Olman Wiley MD   tiZANidine (ZANAFLEX) 4 mg tablet TAKE ONE TABLET BY MOUTH TWICE A DAY AS NEEDED FOR PAIN 7/8/20 Yes Susi Stovall MD   lisinopril (PRINIVIL, ZESTRIL) 40 mg tablet Take 1 Tab by mouth daily. 2/10/20  Yes Susi Stovall MD   diclofenac (VOLTAREN) 1 % gel APPLY 4 GRAMS TOPICALLY TO KNEES THREE TIMES A DAY 10/26/17  Yes Provider, Historical   carvedilol (COREG) 25 mg tablet Take 1 Tab by mouth two (2) times daily (with meals). 7/26/19  Yes Aline Ahuja MD   methadone (DOLOPHINE) 10 mg tablet Take 1 Tab by mouth two (2) times a day. Max Daily Amount: 20 mg. 1/24/19  Yes Gera Oliva DO   pregabalin (LYRICA) 225 mg capsule Take 1 Cap by mouth two (2) times a day. Max Daily Amount: 450 mg. 1/24/19  Yes Gera Oliva DO   HYDROcodone-acetaminophen (NORCO) 5-325 mg per tablet Take 1 Tab by mouth every four (4) hours as needed. Max Daily Amount: 6 Tabs. 1/24/19  Yes Gera Oliva DO   buPROPion  mg Tb24 Take 450 mg by mouth every morning.  1/24/19  Yes Nisha Tyson MD     Current Facility-Administered Medications   Medication Dose Route Frequency    naloxegoL (MOVANTIK) tablet 12.5 mg  12.5 mg Oral DAILY    senna (SENOKOT) tablet 8.6 mg  1 Tablet Oral DAILY    polyethylene glycol (MIRALAX) packet 17 g  17 g Oral TIDAC    dexamethasone (DECADRON) 4 mg/mL injection 8 mg  8 mg IntraVENous Q8H    sodium chloride (NS) flush 5-40 mL  5-40 mL IntraVENous Q8H    sodium chloride (NS) flush 5-40 mL  5-40 mL IntraVENous PRN    acetaminophen (TYLENOL) tablet 650 mg  650 mg Oral Q6H PRN    Or    acetaminophen (TYLENOL) suppository 650 mg  650 mg Rectal Q6H PRN    ondansetron (ZOFRAN ODT) tablet 4 mg  4 mg Oral Q8H PRN    Or    ondansetron (ZOFRAN) injection 4 mg  4 mg IntraVENous Q6H PRN    enoxaparin (LOVENOX) injection 40 mg  40 mg SubCUTAneous DAILY    carvediloL (COREG) tablet 25 mg  25 mg Oral BID WITH MEALS    lisinopriL (PRINIVIL, ZESTRIL) tablet 40 mg  40 mg Oral DAILY    methadone (DOLOPHINE) tablet 10 mg  10 mg Oral BID    pregabalin (LYRICA) capsule 225 mg  225 mg Oral BID tiZANidine (ZANAFLEX) tablet 4 mg  4 mg Oral TID PRN    topiramate (TOPAMAX) tablet 50 mg  50 mg Oral BID    buPROPion XL (WELLBUTRIN XL) tablet 300 mg  300 mg Oral DAILY    HYDROmorphone (DILAUDID) tablet 4 mg  4 mg Oral Q4H PRN     Allergies   Allergen Reactions    Amitriptyline Other (comments)     hallucinating    Neurontin [Gabapentin] Other (comments)     insomnia    Percocet [Oxycodone-Acetaminophen] Nausea Only and Anxiety     Pt states he can tolerate small doses at 10 mg a day        Review of Systems:  Pertinent items are noted in HPI. Objective:     Patient Vitals for the past 8 hrs:   BP Temp Pulse Resp SpO2   10/23/22 1230 132/65 98.6 °F (37 °C) 71 16 95 %   10/23/22 1108 115/63 98.2 °F (36.8 °C) 81 16 96 %   10/23/22 1000 (!) 150/69 97.9 °F (36.6 °C) 78 16 96 %   10/23/22 0919 123/69 98.4 °F (36.9 °C) 83 16 98 %   10/23/22 0800 -- -- -- -- 98 %     Temp (24hrs), Av.3 °F (36.8 °C), Min:97.9 °F (36.6 °C), Max:98.6 °F (37 °C)        EXAM: GEN: Well appearing male in NAD  PSYCH:  AAOx 4. Good insight currently. MUSC/NEURO: Cervical spine with a midline scar of the posterior cervical spine from C4-T1  No cervical stepoff. Skin is intact. Lumbar spine without scars, rashes, or lesions. No ecchymosis of C or L spine. Cervical ROM limited in flexion, extension and rotation. Minimal pain with flexion and extension of C-spine. Negative Spurling's BL.     LUE:   : 4+/5  WF/WE: 4+/5  Bicep: 4-/5  Tricep: 4/5  Deltoid: 5/5  Trapezius: 5/5  DTR: areflexic B/BR/T  Negative Hoffmans. SILT. +radial pulse. RUE:  : 4+/5  WF/WE: 4+/5  Bicep: 4-/5  Tricep: 4/5  Deltoid: 5/5  Trapezius: 5/5  DTR: areflexic B/BR/T  Negative Hoffmans. SILT. +radial pulse. LLE:   + scar midline left knee  No knee effusion.   PF: 3+/5  DF: 3+/5  EHL/EFL: 4/5  KE: 4/5  KF: 4/5  HF: 4-/5  DTR: Areflexic K/A  Sensation-  to LT in L4-S1  Decreased pulse in DP and PT- able to doppler bedside  CR: 4 seconds in all digits. Negative ankle clonus  Negative Babinski  RLE:   + scar midline left knee  No knee effusion. PF: 3+/5  DF: 3+/5  EHL/EFL: 4/5  KE: 4/5  KF: 4/5  HF: 4-/5  DTR: Areflexic K/A  Sensation-  to LT in L4-S1  Decreased pulse in DP and PT- able to doppler bedside  CR: 4 seconds in all digits. Negative ankle clonus  Negative Babinski      IMAGING:  PENDING    Data Review   Recent Results (from the past 24 hour(s))   SAMPLES BEING HELD    Collection Time: 10/22/22  3:47 PM   Result Value Ref Range    SAMPLES BEING HELD 1lav,1red,1dk grn,1blue,1pst,1ssy     COMMENT        Add-on orders for these samples will be processed based on acceptable specimen integrity and analyte stability, which may vary by analyte. CBC WITH AUTOMATED DIFF    Collection Time: 10/22/22  3:47 PM   Result Value Ref Range    WBC 13.4 (H) 4.1 - 11.1 K/uL    RBC 4.36 4.10 - 5.70 M/uL    HGB 13.6 12.1 - 17.0 g/dL    HCT 41.6 36.6 - 50.3 %    MCV 95.4 80.0 - 99.0 FL    MCH 31.2 26.0 - 34.0 PG    MCHC 32.7 30.0 - 36.5 g/dL    RDW 12.8 11.5 - 14.5 %    PLATELET 999 808 - 251 K/uL    MPV 11.8 8.9 - 12.9 FL    NRBC 0.0 0  WBC    ABSOLUTE NRBC 0.00 0.00 - 0.01 K/uL    NEUTROPHILS 84 (H) 32 - 75 %    LYMPHOCYTES 8 (L) 12 - 49 %    MONOCYTES 8 5 - 13 %    EOSINOPHILS 0 0 - 7 %    BASOPHILS 0 0 - 1 %    IMMATURE GRANULOCYTES 0 0.0 - 0.5 %    ABS. NEUTROPHILS 11.2 (H) 1.8 - 8.0 K/UL    ABS. LYMPHOCYTES 1.1 0.8 - 3.5 K/UL    ABS. MONOCYTES 1.1 (H) 0.0 - 1.0 K/UL    ABS. EOSINOPHILS 0.0 0.0 - 0.4 K/UL    ABS. BASOPHILS 0.0 0.0 - 0.1 K/UL    ABS. IMM.  GRANS. 0.0 0.00 - 0.04 K/UL    DF AUTOMATED     METABOLIC PANEL, COMPREHENSIVE    Collection Time: 10/22/22  3:47 PM   Result Value Ref Range    Sodium 137 136 - 145 mmol/L    Potassium 4.1 3.5 - 5.1 mmol/L    Chloride 101 97 - 108 mmol/L    CO2 27 21 - 32 mmol/L    Anion gap 9 5 - 15 mmol/L    Glucose 69 65 - 100 mg/dL    BUN 32 (H) 6 - 20 MG/DL    Creatinine 1.26 0.70 - 1.30 MG/DL BUN/Creatinine ratio 25 (H) 12 - 20      eGFR >60 >60 ml/min/1.73m2    Calcium 8.4 (L) 8.5 - 10.1 MG/DL    Bilirubin, total 1.0 0.2 - 1.0 MG/DL    ALT (SGPT) 26 12 - 78 U/L    AST (SGOT) 48 (H) 15 - 37 U/L    Alk. phosphatase 82 45 - 117 U/L    Protein, total 6.8 6.4 - 8.2 g/dL    Albumin 3.7 3.5 - 5.0 g/dL    Globulin 3.1 2.0 - 4.0 g/dL    A-G Ratio 1.2 1.1 - 2.2     URINALYSIS W/ RFLX MICROSCOPIC    Collection Time: 10/22/22  6:31 PM   Result Value Ref Range    Color YELLOW/STRAW      Appearance CLEAR CLEAR      Specific gravity 1.020 1.003 - 1.030      pH (UA) 5.0 5.0 - 8.0      Protein Negative NEG mg/dL    Glucose Negative NEG mg/dL    Ketone 80 (A) NEG mg/dL    Bilirubin Negative NEG      Blood Negative NEG      Urobilinogen 0.2 0.2 - 1.0 EU/dL    Nitrites Negative NEG      Leukocyte Esterase Negative NEG     SAMPLES BEING HELD    Collection Time: 10/22/22  6:31 PM   Result Value Ref Range    SAMPLES BEING HELD 1UC     COMMENT        Add-on orders for these samples will be processed based on acceptable specimen integrity and analyte stability, which may vary by analyte. Assessment/Plan:   A: 1. Severe central stenosis cervical spine  2. Lumbar stenosis    P: 1. His weakness is likely multifactorial.  He has severe stenosis of his C spine on MRI from 2015. This obviously has not improved over time. I believe his LE weakness is multifactorial.  His weakness is painless and this is not all explained by cervical or lumbar stensosis. Without question, this warrants MRI of his C and L spine. He will be placed on Decadron 10 mg Q8 for 3 doses overnight. We will followup tomorrow once MRIs are complete to make final recommendations. He is WBAT and OOB with therapy. 2. DVT ppx: Ok to anticoagulate. SCDs  3. Pain management: not experiencing much pain. Tylenol 650 q 6 hours is available. 4. Orthopedics to follow, but likely will need SNF.     Discussed case with Dr. Madan Pearl who agrees with plan.        Laverne March, [address]  Orthopaedic Surgery [facility]  [address] [facility]

## 2022-10-23 NOTE — PROGRESS NOTES
Problem: Falls - Risk of  Goal: *Absence of Falls  Description: Document Ada Barry Fall Risk and appropriate interventions in the flowsheet.   Outcome: Progressing Towards Goal  Note: Fall Risk Interventions:  Mobility Interventions: Utilize walker, cane, or other assistive device, Utilize gait belt for transfers/ambulation, Patient to call before getting OOB, Bed/chair exit alarm, PT Consult for mobility concerns         Medication Interventions: Patient to call before getting OOB, Teach patient to arise slowly    Elimination Interventions: Urinal in reach, Toileting schedule/hourly rounds, Patient to call for help with toileting needs, Call light in reach, Bed/chair exit alarm    History of Falls Interventions: Utilize gait belt for transfer/ambulation, Door open when patient unattended, Bed/chair exit alarm, Investigate reason for fall         Problem: Patient Education: Go to Patient Education Activity  Goal: Patient/Family Education  Outcome: Progressing Towards Goal     Problem: Pain  Goal: *Control of Pain  Outcome: Progressing Towards Goal  Goal: *PALLIATIVE CARE:  Alleviation of Pain  Outcome: Progressing Towards Goal     Problem: Patient Education: Go to Patient Education Activity  Goal: Patient/Family Education  Outcome: Progressing Towards Goal

## 2022-10-24 VITALS
WEIGHT: 225 LBS | TEMPERATURE: 98.2 F | RESPIRATION RATE: 18 BRPM | HEIGHT: 78 IN | SYSTOLIC BLOOD PRESSURE: 157 MMHG | HEART RATE: 72 BPM | DIASTOLIC BLOOD PRESSURE: 77 MMHG | OXYGEN SATURATION: 98 % | BODY MASS INDEX: 26.03 KG/M2

## 2022-10-24 PROCEDURE — 2709999900 HC NON-CHARGEABLE SUPPLY

## 2022-10-24 PROCEDURE — 74011250636 HC RX REV CODE- 250/636: Performed by: INTERNAL MEDICINE

## 2022-10-24 PROCEDURE — 74011250637 HC RX REV CODE- 250/637: Performed by: INTERNAL MEDICINE

## 2022-10-24 PROCEDURE — 97116 GAIT TRAINING THERAPY: CPT

## 2022-10-24 PROCEDURE — 74011250636 HC RX REV CODE- 250/636: Performed by: PHYSICIAN ASSISTANT

## 2022-10-24 PROCEDURE — 74011000250 HC RX REV CODE- 250: Performed by: INTERNAL MEDICINE

## 2022-10-24 PROCEDURE — 96376 TX/PRO/DX INJ SAME DRUG ADON: CPT

## 2022-10-24 PROCEDURE — 97165 OT EVAL LOW COMPLEX 30 MIN: CPT

## 2022-10-24 PROCEDURE — 97530 THERAPEUTIC ACTIVITIES: CPT

## 2022-10-24 PROCEDURE — 96372 THER/PROPH/DIAG INJ SC/IM: CPT

## 2022-10-24 PROCEDURE — 74011636637 HC RX REV CODE- 636/637: Performed by: INTERNAL MEDICINE

## 2022-10-24 PROCEDURE — G0378 HOSPITAL OBSERVATION PER HR: HCPCS

## 2022-10-24 PROCEDURE — 97161 PT EVAL LOW COMPLEX 20 MIN: CPT

## 2022-10-24 RX ORDER — PREDNISONE 20 MG/1
60 TABLET ORAL ONCE
Status: COMPLETED | OUTPATIENT
Start: 2022-10-24 | End: 2022-10-24

## 2022-10-24 RX ORDER — PANTOPRAZOLE SODIUM 40 MG/1
40 TABLET, DELAYED RELEASE ORAL
Status: DISCONTINUED | OUTPATIENT
Start: 2022-10-24 | End: 2022-10-24 | Stop reason: HOSPADM

## 2022-10-24 RX ADMIN — PANTOPRAZOLE SODIUM 40 MG: 40 TABLET, DELAYED RELEASE ORAL at 16:03

## 2022-10-24 RX ADMIN — LISINOPRIL 40 MG: 20 TABLET ORAL at 08:45

## 2022-10-24 RX ADMIN — SODIUM CHLORIDE, PRESERVATIVE FREE 10 ML: 5 INJECTION INTRAVENOUS at 15:47

## 2022-10-24 RX ADMIN — HYDROMORPHONE HYDROCHLORIDE 4 MG: 2 TABLET ORAL at 09:08

## 2022-10-24 RX ADMIN — TOPIRAMATE 50 MG: 25 TABLET, FILM COATED ORAL at 08:46

## 2022-10-24 RX ADMIN — HYDROMORPHONE HYDROCHLORIDE 4 MG: 2 TABLET ORAL at 04:38

## 2022-10-24 RX ADMIN — HYDROMORPHONE HYDROCHLORIDE 4 MG: 2 TABLET ORAL at 14:22

## 2022-10-24 RX ADMIN — POLYETHYLENE GLYCOL 3350 17 G: 17 POWDER, FOR SOLUTION ORAL at 16:04

## 2022-10-24 RX ADMIN — ENOXAPARIN SODIUM 40 MG: 100 INJECTION SUBCUTANEOUS at 08:46

## 2022-10-24 RX ADMIN — POLYETHYLENE GLYCOL 3350 17 G: 17 POWDER, FOR SOLUTION ORAL at 11:08

## 2022-10-24 RX ADMIN — PREDNISONE 60 MG: 20 TABLET ORAL at 16:58

## 2022-10-24 RX ADMIN — CARVEDILOL 25 MG: 12.5 TABLET, FILM COATED ORAL at 08:45

## 2022-10-24 RX ADMIN — BUPROPION HYDROCHLORIDE 300 MG: 150 TABLET, EXTENDED RELEASE ORAL at 08:46

## 2022-10-24 RX ADMIN — DEXAMETHASONE SODIUM PHOSPHATE 8 MG: 4 INJECTION, SOLUTION INTRAMUSCULAR; INTRAVENOUS at 05:32

## 2022-10-24 RX ADMIN — SENNOSIDES 8.6 MG: 8.6 TABLET, FILM COATED ORAL at 08:45

## 2022-10-24 RX ADMIN — NALOXEGOL OXALATE 12.5 MG: 12.5 TABLET, FILM COATED ORAL at 08:45

## 2022-10-24 RX ADMIN — SODIUM CHLORIDE, PRESERVATIVE FREE 10 ML: 5 INJECTION INTRAVENOUS at 04:39

## 2022-10-24 RX ADMIN — PREGABALIN 225 MG: 75 CAPSULE ORAL at 08:46

## 2022-10-24 RX ADMIN — HYDROMORPHONE HYDROCHLORIDE 4 MG: 2 TABLET ORAL at 18:15

## 2022-10-24 RX ADMIN — CARVEDILOL 25 MG: 12.5 TABLET, FILM COATED ORAL at 16:03

## 2022-10-24 RX ADMIN — METHADONE HYDROCHLORIDE 10 MG: 5 TABLET ORAL at 08:45

## 2022-10-24 RX ADMIN — POLYETHYLENE GLYCOL 3350 17 G: 17 POWDER, FOR SOLUTION ORAL at 06:45

## 2022-10-24 NOTE — PROGRESS NOTES
10/24/2022    4:49 PM  Pt DC noted. CM confirmed with [facility] that pt's meds will be delivered to his home tomorrow am around 9:15 AM. Pt notified and agreeable to answer door upon their arrival. Medicaid wheelchair transport arranged for hospital discharge today, and CM requested a wheelchair [address] bring a wheelchair for pt use during transport ([phone], Ref # [id number]). [facility]. CM called Dr. Tracey Lopez, pt's pain mgmt doctor, per pt's request, and left a voicemail notifying them of pt's hospitalization and that pt will call to reschedule appt upon discharge. Care Management Interventions  PCP Verified by CM: Yes  Mode of Transport at Discharge: Other (see comment)  Transition of Care Consult (CM Consult): Discharge Planning  Physical Therapy Consult: Yes  Occupational Therapy Consult: Yes  Speech Therapy Consult: No  Support Systems: Caregiver/Home Care Staff  Confirm Follow Up Transport: Family  Discharge Location  Patient Expects to be Discharged to[de-identified] Home        2:20 PM  Care Management Assessment      Reason for Admission: Emergency -       ICD-10-CM ICD-9-CM    1. Weakness  R53.1 780.79       2. Chronic low back pain, unspecified back pain laterality, unspecified whether sciatica present  M54.50 724.2     G89.29 338.29           Assessment:   [x]In person with pt   []Via p/c with pt   []With family member in person. Who/Relation:     []With family member via p/c. Who/Relation:   []Chart Review    RUR: NA - OBS  Risk Level: [x]Low []Moderate []High  Value-based purchasing: [] Yes [x] No    Advance Directive: Full Code. [x] No AD on file. [] AD on file. [] Current AD not on file. Copy requested. [] Requests AD, and referral submitted to [facility]. Healthcare Decision Maker: Yessy Carrizales - wife        Assessment:    Age: 71 y.o.     Sex: [x] Male []Female     Residency: []Private residence [x]Apartment []Assisted Living []LTC []Other:   Exterior Steps: 0  Interior Steps: 0    Lives With: []With spouse []Other family members []Underage children [x]Alone [x]Care provider (Pt's caregiver comes 8 AM to 12 PM, Mon-Fri) ([address]) []Other:    Prior functioning:  []Independent with ADLs and iADLS []Dependent with ADLs and iADLs [x]Partial dependence, Specify: Aide assists with iADLs mainly. Cognition: [x]Intact []Some spheres some of the time. []Some spheres all of the time. []All spheres all of the time. Prior transportation method: []Self []Spouse []Other family members [x]Medicaid transport [x]Other: Pt has groceries delivered. Prior DME required:  []None []RW [x]Cane []Crutches [x]Bedside commode []CPAP []Home O2 (Liter/Provider: ) []Nebulizer   [x]Shower Chair []Wheelchair []Hospital Bed []Angelina []Stair lift [x]Rollator []Other:    Rehab history: []None []Outpatient PT [x]Home Health (Provider/Date: Genia Radford) []SNF (Provider/Date: Genia Radford) []IPR (Provider/Date: ) []LTC (Provider/Date: ) []Hospice (Provider/Date: )  []Other:     Covid vaccination status:   [] Yes, Type:  [] Booster 1 [] Booster 2  [] No  [x] N/A / Not asked    Insurer:   Insurance Information                  Neisha Weirazalea Villatoro [phone] [facility] Phone: --    Subscriber: Jo Ann Munoz Subscriber#: [id number]    Group#: [id number] Precert#: --        [facility] MEDICAID/[place] [facility] COMMUNITY PLAN [facility] Phone: --    SubscriberChristina Ayala Subscriber#: [id number]    Group#: [id number] Precert#: --            PCP: Mitch Eagle   Address: [address] / [place] [address]   Phone number: [phone]   Current patient: [x]Yes []No   Approximate date of last visit: within 1 year   Access to virtual PCP visits: []Yes [x]No     Financial concerns/barriers: []Yes, explain: []No []Unknown/Not discussed    Pharmacy: [facility] Transport: Medicaid transport likely.      Discharge Concerns: []Yes [x]No []Unknown   Describe:    Comments: Pt reported he is unsure if he would need [facility] services, and wanted to wait until closer to DC. Pt's insurance is [employer], [facility]. Transition of care plan:    [x]Unable to determine at this time. Awaiting clinical progress, and disposition recommendations. If pt becomes agreeable, referral will need to be sent to Tessy Kumar as pt is a [employer] pt.     [] Home with family assistance as needed, and outpatient follow-up. [] Home with Outpatient PT and outpatient follow-up   Pt aware of OP appt? []Yes, Provider:   []Not scheduled   Transport provider:     [] Home with Home Health   - Provider:     []SNF/IPR   -[]Preferences given:   []Listing provided and preferences requested   -Status: []Pending []Accepted:    -Auth required: []Yes []No    -Auth initiated date:   -3 midnight stay required: []Yes []No  Date satisfied:     [] LTC:     [] Home with Hospice   -Provider:     [] [facility] information provided. [] Other:     Zoya Baer MA    Care Management Interventions  PCP Verified by CM: Yes  Mode of Transport at Discharge:  Other (see comment)  Transition of Care Consult (CM Consult): Discharge Planning  Physical Therapy Consult: Yes  Occupational Therapy Consult: Yes  Speech Therapy Consult: No  Support Systems: Caregiver/Home Care Staff  Confirm Follow Up Transport: Family  Discharge Location  Patient Expects to be Discharged to[de-identified] Unable to determine at this time

## 2022-10-24 NOTE — PROGRESS NOTES
10/24/22      State Observation Letter was verbally explained to patient and provided in writing to patient. The patient signed the document.

## 2022-10-24 NOTE — PROGRESS NOTES
Discussed with Dr. Tasha Mata. Pt has a chronic pain specialist Dr. Leon Anderson whom he has an established relationship with. He is on a stable dose of twice daily methadone. Patient does not have evidence of an end stage illness at this time therefore not appropriate for palliative medicine team to assume care and prescribing of his opioid regimen. Will cancel consultation at this time.      Dr. Rajesh Sloan

## 2022-10-24 NOTE — WOUND CARE
Wound Consult:  New Patient Visit  Consulted for: left buttock sore POA. Patient resting on Edith bed; [employer] system in place. Romel score: 18; patient reports decline in overall activity since having COVID in June of 2022. At some point after that time he notes he developed sore on his left buttock and has been trying to get it to heal.   Patient turns side to side in bed without assistance. Patient is alert and oriented x 4; his nurse Ahmed at bedside and assesses area as we provide care. Assessment:  Left buttock - ~ 1.6 x 0.3 x 0.1 cm pink moist area with healing skin surrounding, slightly macerated in muna-wound, no redness; no redness to sacrum noted or right buttock; POA injury to left buttock; difficult to ascertain in injury was more moisture/friction vs pressure; if pressure Stage 2. Right heel - blanching redness noted, no tenderness; patient reports neuropathy. No redness to left heel. Treatment:  Has sacral foam in place to buttocks/sacrum and reseated. Floated heel off bed surface. Wound Recommendations:  Left buttock wound: use sacral foam dressing; change every three days. Float heels as needed. Skin and PI Prevention Recommendations:  Encourage patient and assist as needed to off loaded with turning / repositioning routinely. Off load heels: use pillows. Use waffle cushion while in chair. Brought in for patient and educated on use and that he can take home with him. Manage moisture with frequent incontinence checks; intentional toileting; add barrier ointment if needed; using urinal/reports continence. Continue to monitor risk assessment with Romel score; Dual skin assessment and changes in condition. Promote nutrition; consult if needed. Plan:  Discussed with patient's nurse Amador. Hand off to Dr. Whitley Ramirez. Orders proposed and added. We will continue to reassess weekly. Please re-consult should any concerns arise prior to next visit.   Chris Martinez RN,CWON      Wound and Ostomy Department.   [phone] wound nurse or Perfect Serve

## 2022-10-24 NOTE — DISCHARGE SUMMARY
Physician Discharge Summary     Patient ID:  Dottie Leal  [id number]  71 y.o.  1950    Admit date: 10/22/2022    Discharge date of service and time: 10/24/2022  Greater than 30 minutes were spent providing discharge related services for this patient    Admission Diagnoses: Intractable low back pain [M54.59]    Discharge Diagnoses:    Principal Diagnosis   <principal problem not specified>                                             Hospital Course and other diagnoses  Weakness - POA, likely multifactorial due to pain, deconditioning, anxiety, dehydration and other. Fall precautions. PT OT eval. CM consult. May need SNF     Chronic pain / Cervical stenosis of spine / Lumbar spinal stenosis / Intractable low back pain - Consulted Ortho to see if any acute inpatient imaging or surgical needs. They ordered MRI and steroids. Pain meds as below     Anxiety and depression / Fibromyalgia / Insomnia - Outpatient psych follow up. Continue topiramate and bupropion      Polypharmacy / Methadone use / Chronic narcotic dependence / Chronic prescription benzodiazepine use - Palliative care consult. Continue home methadone, add Lyrica, tizanidine, dilaudid. Holding norco, flexeril      Hypertension - Continue coreg and lisinopril     Constipation - Continue scheduled miralax and movantik     Gastroesophageal reflux disease / Nausea - POA, add PPI and prn zofran. Dehydration - POA due to poor PO intake. Hydrate. Hold NSAIDS     Leukocytosis - Presumed due to steroids. No sign of infection. PCP: Ching Stallworth MD    Consults: Orthopedic Surgery and Palliative Care    Significant Diagnostic Studies: See Hospital Course    Discharged home in improved condition.     Discharge Exam:  BP (!) 148/69 (BP 1 Location: Left upper arm, BP Patient Position: At rest)   Pulse 61   Temp 97.6 °F (36.4 °C)   Resp 18   Ht 6' 7\" (2.007 m)   Wt 102.1 kg (225 lb)   SpO2 97%   BMI 25.35 kg/m²      Gen:  Well-developed, well-nourished, in mild acute distress  HEENT:  Pink conjunctivae, PERRL, hearing intact to voice, moist mucous membranes  Neck:  Supple, without masses, thyroid non-tender  Resp:  No accessory muscle use, clear breath sounds without wheezes rales or rhonchi  Card:  No murmurs, normal S1, S2 without thrills, bruits or peripheral edema  Abd:  Soft, non-tender, non-distended, normoactive bowel sounds are present, no mass  Lymph:  No cervical or inguinal adenopathy  Musc:  No cyanosis or clubbing  Skin:  No rashes or ulcers, skin turgor is good  Neuro:  Cranial nerves are grossly intact, general motor weakness, follows commands   Psych: Moderat insight, oriented to person, place and time, alert    Patient Instructions:   Current Discharge Medication List        START taking these medications    Details   cyclobenzaprine (FLEXERIL) 10 mg tablet Take 1 Tablet by mouth three (3) times daily as needed for Muscle Spasm(s) for up to 5 days. Qty: 15 Tablet, Refills: 0      predniSONE (DELTASONE) 50 mg tablet Take 1 Tablet by mouth daily for 3 days. Qty: 3 Tablet, Refills: 0           CONTINUE these medications which have NOT CHANGED    Details   MULTIVITAMIN PO Take  by mouth. 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   Qty: 90 Tab, Refills: 1      diclofenac (VOLTAREN) 1 % gel APPLY 4 GRAMS TOPICALLY TO KNEES THREE TIMES A DAY      carvedilol (COREG) 25 mg tablet Take 1 Tab by mouth two (2) times daily (with meals). Qty: 180 Tab, Refills: 3      methadone (DOLOPHINE) 10 mg tablet Take 1 Tab by mouth two (2) times a day. Max Daily Amount: 20 mg.  Qty: 20 Tab, Refills: 0    Associated Diagnoses: Chronic pain syndrome      pregabalin (LYRICA) 225 mg capsule Take 1 Cap by mouth two (2) times a day. Max Daily Amount: 450 mg.  Qty: 20 Cap, Refills: 0    Associated Diagnoses: Chronic pain syndrome      HYDROcodone-acetaminophen (NORCO) 5-325 mg per tablet Take 1 Tab by mouth every four (4) hours as needed. Max Daily Amount: 6 Tabs. Qty: 20 Tab, Refills: 0    Associated Diagnoses: Cellulitis of left lower extremity      buPROPion  mg Tb24 Take 450 mg by mouth every morning. Qty: 14 Tab, Refills: 1           Activity: Activity as tolerated and PT/OT per Home Health  Diet: Regular Diet  Wound Care: None needed    Follow-up with your PCP, pain doctor and orthopedics in a few weeks.   Follow-up tests/labs - none    Signed:  Brenda Sanon MD  10/24/2022  1:41 PM

## 2022-10-24 NOTE — PROGRESS NOTES
Orthopaedic Progress Note    2022 10:54 AM     Patient: Brittni Ziegler MRN: [id number]  SSN: [id number]    YOB: 1950  Age: 71 y.o. Sex: male      Admit date:  10/22/2022  Admitting Physician:  Minerva Hunter DO   Consulting Physician(s): Treatment Team: Attending Provider: Marnell Canavan, MD; Consulting Provider: Bridgett Lainez MD; Consulting Provider: Lacie Virk MD; Utilization Review: Ned Valenzuela RN; Occupational Therapist: Keila Del Castillo; Staff Nurse: Ziggy Kelsey RN; Physical Therapist: Shima Guadarrama PT; Physician Assistant: JEAN CARLOS Martin    SUBJECTIVE:     Brittni Ziegler is a 71 y.o. male not available at first attempt on rounds due to eating his lunch. Amenable to exam afterwards and very pleasant and comfortable. He states significant improvement in his strength since starting the steroids. He verbalizes frustration with his pain management and that he was doing well on previous doses and then they reduced both his pain medication and his anxiety medication. No complaints of nausea, vomiting, dizziness, lightheadedness, chest pain, or shortness of breath. OBJECTIVE:       Physical Exam:  General: Alert, cooperative, no distress. Respiratory: Respirations unlabored  Neurological:  Neurovascular exam within normal limits. Motor: + DF/PF. Musculoskeletal: Calves soft, supple, non-tender upon palpation. Dressing/Wound:  Clean, dry and intact. No significant erythema or swelling.       Vital Signs:        Patient Vitals for the past 8 hrs:   BP Temp Pulse Resp SpO2   10/24/22 1210 (!) 169/79 98.4 °F (36.9 °C) 70 -- 97 %   10/24/22 0950 (!) 146/83 -- 77 -- 97 %   10/24/22 0908 (!) 162/75 -- 71 -- 97 %   10/24/22 0755 (!) 148/69 97.6 °F (36.4 °C) 61 18 97 %                                          Temp (24hrs), Av.2 °F (36.8 °C), Min:97.6 °F (36.4 °C), Max:98.7 °F (37.1 °C)      Labs:        Recent Labs 10/22/22  1547   HCT 41.6   HGB 13.6     Lab Results   Component Value Date/Time    Sodium 137 10/22/2022 03:47 PM    Potassium 4.1 10/22/2022 03:47 PM    Chloride 101 10/22/2022 03:47 PM    CO2 27 10/22/2022 03:47 PM    Glucose 69 10/22/2022 03:47 PM    BUN 32 (H) 10/22/2022 03:47 PM    Creatinine 1.26 10/22/2022 03:47 PM    Calcium 8.4 (L) 10/22/2022 03:47 PM       PT/OT:        Gait  Base of Support: Narrowed  Speed/Renea: Pace decreased (<100 feet/min)  Step Length: Left lengthened, Right lengthened  Gait Abnormalities: Decreased step clearance  Ambulation - Level of Assistance: Minimal assistance  Distance (ft): 20 Feet (ft)  Assistive Device: Gait belt, Walker, rolling       Patient mobility  Bed Mobility Training  Supine to Sit: Contact guard assistance  Sit to Supine: Minimum assistance  Scooting: Contact guard assistance  Transfer Training  Sit to Stand: Minimum assistance, Assist x2, Additional time  Stand to Sit: Minimum assistance, Assist x2, Additional time      Gait Training  Assistive Device: Gait belt, Walker, rolling  Ambulation - Level of Assistance: Minimal assistance  Distance (ft): 20 Feet (ft)            ASSESSMENT / PLAN:   Active Problems:    Constipation ()      Chronic pain ()      Overview: Due to spinal stenosis, follows with dr Jesika Lozada pain clinic at [facility].       Fibromyalgia (10/31/2012)      Gastroesophageal reflux disease without esophagitis (10/31/2012)      Insomnia (10/31/2012)      Spinal stenosis (10/31/2012)      Weakness (1/17/2015)      Methadone use (2/3/2019)      Chronic narcotic dependence ([facility] [address] (10/9/2019)      Chronic prescription benzodiazepine use (10/9/2019)      Intractable low back pain (10/22/2022)      Nausea (10/23/2022)      Chronic, continuous use of opioids (10/23/2022)      Hypertension (10/23/2022)      Polypharmacy (10/23/2022)      Cervical stenosis of spine (10/23/2022)      Overview: Cervical spine spine MRI of 1/21/2015 showed C6-C7 severe central spinal       canal stenosis, C5-C6 moderate central spinal canal stenosis, severe       bilateral foraminal stenosis C3-C4 through C5-C6. Cord signal at C4 and       C5-C6 representing cord contusion versus chronic myelomalacia.       Anxiety and depression (10/23/2022)      Lumbar spinal stenosis (10/23/2022)      Dehydration (10/23/2022)    Severe central canal stenosis / BUE and BLE weakness- improved on Steroids    Continue pain management and anxiety treatment     PO Steroid taper and follow-up with orthopedic spine specialist in 1-2 weeks      Signed By:  Olman Pritchett, [address] [place] [address]

## 2022-10-24 NOTE — PROGRESS NOTES
[facility]  [address] [address]  [phone]    Hospitalist Progress Note      NAME: Lula Mcknight   :  1950  MRM:  [id number]    Date/Time of service 10/24/2022  7:57 AM          Assessment and Plan:     Weakness - POA, likely multifactorial due to pain, deconditioning, anxiety, dehydration and other. Fall precautions. PT OT eval. CM consult. May need SNF    Chronic pain / Cervical stenosis of spine / Lumbar spinal stenosis / Intractable low back pain - Consulted Ortho to see if any acute inpatient imaging or surgical needs. They ordered MRI and steroids. Pain meds as below    Anxiety and depression / Fibromyalgia / Insomnia - Outpatient psych follow up. Continue topiramate and bupropion      Polypharmacy / Methadone use / Chronic narcotic dependence / Chronic prescription benzodiazepine use - Palliative care consult. Continue home methadone, add Lyrica, tizanidine, dilaudid. Holding norco, flexeril     Hypertension - Continue coreg and lisinopril    Constipation - Continue scheduled miralax and movantik    Gastroesophageal reflux disease / Nausea - POA, add PPI and prn zofran. Dehydration - POA due to poor PO intake. Hydrate. Hold NSAIDS    Leukocytosis - Presumed due to steroids. No sign of infection. Subjective:     Chief Complaint:  pain and weakness a bit better    ROS:  (bold if positive, if negative)    Tolerating minimal PT  Tolerating Diet        Objective:     Last 24hrs VS reviewed since prior progress note.  Most recent are:    Visit Vitals  BP (!) 148/69 (BP 1 Location: Left upper arm, BP Patient Position: At rest)   Pulse 61   Temp 97.6 °F (36.4 °C)   Resp 18   Ht 6' 7\" (2.007 m)   Wt 102.1 kg (225 lb)   SpO2 97%   BMI 25.35 kg/m²     SpO2 Readings from Last 6 Encounters:   10/24/22 97%   22 96%   10/09/19 91%   19 94%   19 97%   19 95%          Intake/Output Summary (Last 24 hours) at 10/24/2022 5333  Last data filed at 10/23/2022 2106  Gross per 24 hour   Intake 350 ml   Output 1275 ml   Net -925 ml          Physical Exam:    Gen:  Well-developed, well-nourished, in mild acute distress  HEENT:  Pink conjunctivae, PERRL, hearing intact to voice, moist mucous membranes  Neck:  Supple, without masses, thyroid non-tender  Resp:  No accessory muscle use, clear breath sounds without wheezes rales or rhonchi  Card:  No murmurs, normal S1, S2 without thrills, bruits or peripheral edema  Abd:  Soft, non-tender, non-distended, normoactive bowel sounds are present, no mass  Lymph:  No cervical or inguinal adenopathy  Musc:  No cyanosis or clubbing  Skin:  No rashes or ulcers, skin turgor is good  Neuro:  Cranial nerves are grossly intact, general motor weakness, follows commands   Psych:   Moderat insight, oriented to person, place and time, alert    Telemetry reviewed:   normal sinus rhythm  __________________________________________________________________  Medications Reviewed: (see below)  Medications:     Current Facility-Administered Medications   Medication Dose Route Frequency    naloxegoL (MOVANTIK) tablet 12.5 mg  12.5 mg Oral DAILY    senna (SENOKOT) tablet 8.6 mg  1 Tablet Oral DAILY    polyethylene glycol (MIRALAX) packet 17 g  17 g Oral TIDAC    sodium chloride (NS) flush 5-40 mL  5-40 mL IntraVENous Q8H    sodium chloride (NS) flush 5-40 mL  5-40 mL IntraVENous PRN    acetaminophen (TYLENOL) tablet 650 mg  650 mg Oral Q6H PRN    Or    acetaminophen (TYLENOL) suppository 650 mg  650 mg Rectal Q6H PRN    ondansetron (ZOFRAN ODT) tablet 4 mg  4 mg Oral Q8H PRN    Or    ondansetron (ZOFRAN) injection 4 mg  4 mg IntraVENous Q6H PRN    enoxaparin (LOVENOX) injection 40 mg  40 mg SubCUTAneous DAILY    carvediloL (COREG) tablet 25 mg  25 mg Oral BID WITH MEALS    lisinopriL (PRINIVIL, ZESTRIL) tablet 40 mg  40 mg Oral DAILY    methadone (DOLOPHINE) tablet 10 mg  10 mg Oral BID    pregabalin (LYRICA) capsule 225 mg  225 mg Oral BID    tiZANidine (ZANAFLEX) tablet 4 mg  4 mg Oral TID PRN    topiramate (TOPAMAX) tablet 50 mg  50 mg Oral BID    buPROPion XL (WELLBUTRIN XL) tablet 300 mg  300 mg Oral DAILY    HYDROmorphone (DILAUDID) tablet 4 mg  4 mg Oral Q4H PRN        Lab Data Reviewed: (see below)  Lab Review:     Recent Labs     10/22/22  1547   WBC 13.4*   HGB 13.6   HCT 41.6          Recent Labs     10/22/22  1547      K 4.1      CO2 27   GLU 69   BUN 32*   CREA 1.26   CA 8.4*   ALB 3.7   TBILI 1.0   ALT 26       Lab Results   Component Value Date/Time    Glucose (POC) 80 01/20/2015 08:00 AM    Glucose (POC) 96 01/19/2015 09:45 PM    Glucose (POC) 129 (H) 01/19/2015 04:25 PM    Glucose (POC) 110 01/19/2015 11:26 AM    Glucose (POC) 105 01/19/2015 08:07 AM     No results for input(s): PH, PCO2, PO2, HCO3, FIO2 in the last 72 hours. No results for input(s): INR, INREXT, INREXT in the last 72 hours. All Micro Results       None            Other pertinent lab: none    Total time spent with patient: 30 Minutes I personally reviewed chart, notes, data and current medications in the medical record. I have personally examined and treated the patient at bedside during this period. To assist coordination of care and communication with nursing and staff, this note may be preliminary early in the day, but finalized by end of the day.                  Care Plan discussed with: Patient, Care Manager, Nursing Staff, Consultant/Specialist, and >50% of time spent in counseling and coordination of care    Discussed:  Care Plan and D/C Planning    Prophylaxis:  Lovenox and H2B/PPI    Disposition:   PT, OT, RN           ___________________________________________________    Attending Physician: Stephie Albert MD

## 2022-10-24 NOTE — PROGRESS NOTES
Problem: Self Care Deficits Care Plan (Adult)  Goal: *Acute Goals and Plan of Care (Insert Text)  Description:  FUNCTIONAL STATUS PRIOR TO ADMISSION: Patient was modified independent using a rollator for functional mobility. Outside of the home only. HOME SUPPORT: The patient lived alone with part time caregiver (4 hours/per weekday) to provide assistance. Occupational Therapy Goals  Initiated 10/24/2022  1. Patient will perform upper body dressing with supervision/set-up within 7 day(s). 2.  Patient will perform lower body dressing with supervision/set-up within 7 day(s). 3.  Patient will perform toileting with supervision/set-up within 7 day(s). 4.  Patient will perform toilet transfers with supervision/set-up within 7 day(s). 5.  Patient will perform all aspects of toileting with supervision/set-up within 7 day(s). 6.  Patient will participate in upper extremity therapeutic exercise/activities with modified independence for 10 minutes within 7 day(s). 7.  Patient will utilize energy conservation techniques during functional activities with verbal and visual cues within 7 day(s). 10/24/2022 1034 by Tammy Danielle OT  Outcome: Not Met   OCCUPATIONAL THERAPY EVALUATION  Patient: Gregg Sánchez (7 y.o. male)  Date: 10/24/2022  Primary Diagnosis: Intractable low back pain [M54.59]       Precautions: Fall risk       ASSESSMENT  Based on the objective data described below, the patient presents with impaired sensation to hands/feet at baseline, decreased strength and ROM to UE, impaired standing balance, decrease from functional baseline for ADLs and functional mobility (previously IND in home and Mod I with rollator for community mobility. Patient received supine in bed and is agreeable to OT eval this morning. Requires CGA-Min assistance for bed mobility and transfers this 2/2 stiffness and moderate pain.  Patient endorsing increased pain with sitting up and unable to tolerate sitting in chair, returned to bed at end of session. Discussed d/c dispo patient adamantly refusing rehab but open to further discussion on [address]. Recommend HHOT and supervision at d/c if pt continues to decline rehab. Current Level of Function Impacting Discharge (ADLs/self-care): Min A dressing, Min A transfers and functional mobility    Functional Outcome Measure: The patient scored Total: 50/100 on the Barthel Index outcome measure       Other factors to consider for discharge: lives alone, no local support, refusing rehab but considering HHOT/PT     Patient will benefit from skilled therapy intervention to address the above noted impairments. PLAN :  Recommendations and Planned Interventions: self care training, functional mobility training, therapeutic exercise, balance training, therapeutic activities, endurance activities, patient education, home safety training, and family training/education    Frequency/Duration: Patient will be followed by occupational therapy 5 times a week to address goals. Recommendation for discharge: (in order for the patient to meet his/her long term goals)  Occupational therapy at least 2 days/week in the home if continuing to refuse SNF/IPR    This discharge recommendation:  Has been made in collaboration with the attending provider and/or case management    IF patient discharges home will need the following DME: patient owns DME required for discharge       SUBJECTIVE:   Patient stated I'm kind of reclusive I don't like to leave the house much.     OBJECTIVE DATA SUMMARY:   HISTORY:   Past Medical History:   Diagnosis Date    Anemia NEC     Anxiety and depression     Asthma     Cervical stenosis of spine     Chronic pain     Chronic prescription benzodiazepine use     Chronic, continuous use of opioids     Constipation     Hypertension     Hypogonadism male     Insomnia     Lumbar spinal stenosis     Polypharmacy     Vertebral compression fracture (HCC)      Past Surgical History: Procedure Laterality Date    HX BACK SURGERY      HX CATARACT REMOVAL Bilateral     HX CERVICAL LAMINECTOMY      HX CYST INCISION AND DRAINAGE  01/14/2019    Drain left lateral thigh abscess    HX TONSILLECTOMY         Expanded or extensive additional review of patient history:     Home Situation  Home Environment: Apartment  One/Two Story Residence: One story  Living Alone: Yes  Support Systems: Caregiver/Home Care Staff (4 hours/weekday)  Patient Expects to be Discharged to[de-identified] Home  Current DME Used/Available at Home: Gwen Mitchel, rollator, Cane, straight, Shower chair, Grab bars, Walker, rolling  Tub or Shower Type: Tub/Shower combination    Hand dominance: Right    EXAMINATION OF PERFORMANCE DEFICITS:  Cognitive/Behavioral Status:  Neurologic State: Alert  Orientation Level: Oriented X4  Cognition: Appropriate decision making; Appropriate for age attention/concentration; Appropriate safety awareness; Follows commands  Perception: Appears intact  Perseveration: No perseveration noted  Safety/Judgement: Awareness of environment; Fall prevention;Home safety; Insight into deficits      Hearing: Auditory  Auditory Impairment: Hard of hearing, bilateral    Vision/Perceptual:    Tracking: Able to track stimulus in all quadrants w/o difficulty                      Acuity: Able to read clock/calendar on wall without difficulty    Corrective Lenses: Reading glasses    Range of Motion:  AROM: Generally decreased, functional  PROM: Generally decreased, functional      Strength:  Strength: Generally decreased, functional     Coordination:  Coordination: Within functional limits  Fine Motor Skills-Upper: Left Intact; Right Intact    Gross Motor Skills-Upper: Left Intact; Right Intact    Tone & Sensation:  Tone: Normal  Sensation: Impaired (constant numbness in B hands/feet)     Balance:  Sitting: Intact; Without support  Standing: Impaired; With support  Standing - Static: Fair;Constant support  Standing - Dynamic : Fair;Constant support    Functional Mobility and Transfers for ADLs:  Bed Mobility:  Supine to Sit: Contact guard assistance  Sit to Supine: Minimum assistance  Scooting: Contact guard assistance    Transfers:  Sit to Stand: Minimum assistance;Assist x2; Additional time  Stand to Sit: Minimum assistance;Assist x2; Additional time    ADL Assessment:  Feeding: Setup    Oral Facial Hygiene/Grooming: Setup    Bathing: Minimum assistance    Type of Bath: Chlorhexidine (CHG); Partial    Upper Body Dressing: Setup;Stand-by assistance    Lower Body Dressing: Minimum assistance    Toileting: Minimum assistance                ADL Intervention and task modifications:                 Type of Bath: Chlorhexidine (CHG); Partial                        Cognitive Retraining  Safety/Judgement: Awareness of environment; Fall prevention;Home safety; Insight into deficits     Functional Measure:    Barthel Index:  Bathin  Bladder: 10  Bowels: 10  Groomin  Dressin  Feeding: 10  Mobility: 0  Stairs: 0  Toilet Use: 5  Transfer (Bed to Chair and Back): 5  Total: 50/100      The Barthel ADL Index: Guidelines  1. The index should be used as a record of what a patient does, not as a record of what a patient could do. 2. The main aim is to establish degree of independence from any help, physical or verbal, however minor and for whatever reason. 3. The need for supervision renders the patient not independent. 4. A patient's performance should be established using the best available evidence. Asking the patient, friends/relatives and nurses are the usual sources, but direct observation and common sense are also important. However direct testing is not needed. 5. Usually the patient's performance over the preceding 24-48 hours is important, but occasionally longer periods will be relevant. 6. Middle categories imply that the patient supplies over 50 per cent of the effort. 7. Use of aids to be independent is allowed.     Score Interpretation (from Sinoff 1997)    Independent   60-79 Minimally independent   40-59 Partially dependent   20-39 Very dependent   <20 Totally dependent     -Susan Hines., Barthel, D.W. (1965). Functional evaluation: the Barthel Index. [address] ([address] (). The Barthel activities of daily living index: self-reporting versus actual performance in the old (> or = 75 years). Journal [address] 45(7), 14 [facility], J.J.SIVAN.F, Lia Elderkelli., Saurabh [facility]. (1999). Measuring the change in disability after inpatient rehabilitation; comparison of the responsiveness of the Barthel Index and Functional Dimmit Measure. Journal of Neurology, Neurosurgery, and Psychiatry, 66(4), 598-838. JOSEPH Hunter, OLIVIA Wu, & Krystyna De La Vega MROBERT. (2004) Assessment of post-stroke quality of life in cost-effectiveness studies: The usefulness of the Barthel Index and the EuroQoL-5D. Quality of Life Research, 15, 105-52     Occupational Therapy Evaluation Charge Determination   History Examination Decision-Making   LOW Complexity : Brief history review  LOW Complexity : 1-3 performance deficits relating to physical, cognitive , or psychosocial skils that result in activity limitations and / or participation restrictions  LOW Complexity : No comorbidities that affect functional and no verbal or physical assistance needed to complete eval tasks       Based on the above components, the patient evaluation is determined to be of the following complexity level: LOW   Pain Ratin/10 localized to neck    Activity Tolerance:   Fair, SpO2 stable on RA, and requires rest breaks    After treatment patient left in no apparent distress:    Supine in bed, Call bell within reach, Bed / chair alarm activated, and Side rails x 3    COMMUNICATION/EDUCATION:   The patients plan of care was discussed with: Physical therapist and Registered nurse.      Home safety education was provided and the patient/caregiver indicated understanding., Patient/family have participated as able in goal setting and plan of care. , and Patient/family agree to work toward stated goals and plan of care. This patients plan of care is appropriate for delegation to ROB.     Thank you for this referral.  Citlali Kelly, OT  Time Calculation: 23 mins

## 2022-10-24 NOTE — PROGRESS NOTES
Problem: Mobility Impaired (Adult and Pediatric)  Goal: *Acute Goals and Plan of Care (Insert Text)  Description: FUNCTIONAL STATUS PRIOR TO ADMISSION: Patient was modified independent using a rollator for functional mobility. HOME SUPPORT PRIOR TO ADMISSION: The patient lived alone with no local support. Has caregiver that comes 5 days a week from 8-12 primarily to clean. Patient uses transportation services for all medical appointments and has groceries delivered. Physical Therapy Goals  Initiated 10/24/2022  1. Patient will move from supine to sit and sit to supine  in bed with independence within 7 day(s). 2.  Patient will transfer from bed to chair and chair to bed with modified independence using the least restrictive device within 7 day(s). 3.  Patient will perform sit to stand with modified independence within 7 day(s). 4.  Patient will ambulate with modified independence for 75 feet with the least restrictive device within 7 day(s). Outcome: Progressing Towards Goal   PHYSICAL THERAPY EVALUATION  Patient: Babatunde Armenta [de-identified]71 y.o. male)  Date: 10/24/2022  Primary Diagnosis: Intractable low back pain [M54.59]       Precautions:   Fall    ASSESSMENT  Based on the objective data described below, the patient presents with generalized weakness, RLE slightly weaker than LLE and more proximal weakness than distal, decreased bed mobility, transfers and functional ambulation, and high fall risk following admission for intractable back pain. Per MRI, patient has moderate to severe stenosis at cervical and lumbar levels. He has history of cervical laminectomy and fibromyalgia, lumbar stenosis and HTN. He lives alone and reports being reclusive and does not want to go to rehab. He will consider HHPT. He has all DME. Current Level of Function Impacting Discharge (mobility/balance): Patient received in bed willing to work with PT. Reports feeling better today and \"can move my legs now. \" Patient being treated with steroids. He was able to stand and ambulate around the bed with RW +2 min assist.  Patient with kyphotic posture but can correct with time for brief period. Reports increase pain to back with activity and requested to return to bed rather than chair. Functional Outcome Measure: The patient scored 17/28 on the Tinetti outcome measure which is indicative of high fall risk. Other factors to consider for discharge: lives alone; no support except home aid 5 times weekly     Patient will benefit from skilled therapy intervention to address the above noted impairments. PLAN :  Recommendations and Planned Interventions: bed mobility training, transfer training, gait training, and therapeutic exercises      Frequency/Duration: Patient will be followed by physical therapy:  6 times a week to address goals. Recommendation for discharge: (in order for the patient to meet his/her long term goals)  Physical therapy at least 2 days/week in the home     This discharge recommendation:  Has been made in collaboration with the attending provider and/or case management    IF patient discharges home will need the following DME: patient owns DME required for discharge         SUBJECTIVE:   Patient stated I always have pain but it is getting better.     OBJECTIVE DATA SUMMARY:   HISTORY:    Past Medical History:   Diagnosis Date    Anemia NEC     Anxiety and depression     Asthma     Cervical stenosis of spine     Chronic pain     Chronic prescription benzodiazepine use     Chronic, continuous use of opioids     Constipation     Hypertension     Hypogonadism male     Insomnia     Lumbar spinal stenosis     Polypharmacy     Vertebral compression fracture (HCC)      Past Surgical History:   Procedure Laterality Date    HX BACK SURGERY      HX CATARACT REMOVAL Bilateral     HX CERVICAL LAMINECTOMY      HX CYST INCISION AND DRAINAGE  01/14/2019    Drain left lateral thigh abscess    HX TONSILLECTOMY Personal factors and/or comorbidities impacting plan of care: lives alone; no local support; declines rehab; high fall risk    Home Situation  Home Environment: Apartment  One/Two Story Residence: One story  Living Alone: Yes  Support Systems: Caregiver/Home Care Staff (4 hours/weekday)  Patient Expects to be Discharged to[de-identified] Home  Current DME Used/Available at Home: Walker, rollator, Cane, straight, Shower chair, Grab bars, Walker, rolling  Tub or Shower Type: Tub/Shower combination    EXAMINATION/PRESENTATION/DECISION MAKING:   Critical Behavior:  Neurologic State: Alert  Orientation Level: Oriented X4  Cognition: Appropriate decision making, Appropriate for age attention/concentration, Appropriate safety awareness, Follows commands  Safety/Judgement: Awareness of environment, Fall prevention, Home safety, Insight into deficits  Hearing: Auditory  Auditory Impairment: Hard of hearing, bilateral      Range Of Motion:  AROM: Generally decreased, functional           PROM: Generally decreased, functional           Strength:    Strength: Generally decreased, functional                    Tone & Sensation:   Tone: Normal              Sensation: Impaired (constant numbness in B hands/feet)               Coordination:  Coordination: Within functional limits  Vision:   Tracking: Able to track stimulus in all quadrants w/o difficulty  Acuity: Able to read clock/calendar on wall without difficulty  Corrective Lenses: Reading glasses  Functional Mobility:  Bed Mobility:     Supine to Sit: Contact guard assistance  Sit to Supine: Minimum assistance  Scooting: Contact guard assistance  Transfers:  Sit to Stand: Minimum assistance;Assist x2; Additional time  Stand to Sit: Minimum assistance;Assist x2; Additional time                       Balance:   Sitting: Intact; Without support  Standing: Impaired; With support  Standing - Static: Fair;Constant support  Standing - Dynamic : Fair;Constant support  Ambulation/Gait Training:  Distance (ft): 20 Feet (ft)  Assistive Device: Gait belt;Walker, rolling  Ambulation - Level of Assistance: Minimal assistance        Gait Abnormalities: Decreased step clearance        Base of Support: Narrowed     Speed/Renea: Pace decreased (<100 feet/min)  Step Length: Left lengthened;Right lengthened                       Therapeutic Exercises: Ankle pumps    Functional Measure:  Tinetti test:    Sitting Balance: 1  Arises: 1  Attempts to Rise: 2  Immediate Standing Balance: 1  Standing Balance: 1  Nudged: 0  Eyes Closed: 0  Turn 360 Degrees - Continuous/Discontinuous: 0  Turn 360 Degrees - Steady/Unsteady: 0  Sitting Down: 1  Balance Score: 7 Balance total score  Indication of Gait: 1  R Step Length/Height: 1  L Step Length/Height: 1  R Foot Clearance: 1  L Foot Clearance: 1  Step Symmetry: 1  Step Continuity: 1  Path: 1  Trunk: 1  Walking Time: 1  Gait Score: 10 Gait total score  Total Score: 17/28 Overall total score         Tinetti Tool Score Risk of Falls  <19 = High Fall Risk  19-24 = Moderate Fall Risk  25-28 = Low Fall Risk  Tinetti ME. Performance-Oriented Assessment of Mobility Problems in Elderly Patients. [facility] 66; [id number].  (Scoring Description: PT Bulletin Feb. 10, 1993)    Older adults: Janine Go et al, 2009; n = 1000 [facility] elderly evaluated with ABC, TANA, ADL, and IADL)  · Mean TANA score for males aged 69-68 years = 26.21(3.40)  · Mean TANA score for females age 69-68 years = 25.16(4.30)  · Mean TANA score for males over 80 years = 23.29(6.02)  · Mean TANA score for females over 80 years = 17.20(8.32)           Physical Therapy Evaluation Charge Determination   History Examination Presentation Decision-Making   MEDIUM  Complexity : 1-2 comorbidities / personal factors will impact the outcome/ POC  LOW Complexity : 1-2 Standardized tests and measures addressing body structure, function, activity limitation and / or participation in recreation  LOW Complexity : Stable, uncomplicated Other outcome measures Tinetti  MEDIUM      Based on the above components, the patient evaluation is determined to be of the following complexity level: LOW     Pain Ratin/10 to neck    Activity Tolerance:   Fair    After treatment patient left in no apparent distress:   Supine in bed, Call bell within reach, Bed / chair alarm activated, and Side rails x 3    COMMUNICATION/EDUCATION:   The patients plan of care was discussed with: Occupational therapist and Registered nurse. Fall prevention education was provided and the patient/caregiver indicated understanding. and Patient/family agree to work toward stated goals and plan of care.     Thank you for this referral.  Abril Simmons, PT   Time Calculation: 30 mins

## 2022-10-24 NOTE — PROGRESS NOTES
Bedside and Verbal shift change report given to Amador (oncoming nurse) by Sergio Gustafson (offgoing nurse). Report included the following information SBAR, Kardex, and Recent Results.

## 2022-11-22 PROBLEM — E86.0 DEHYDRATION: Status: RESOLVED | Noted: 2022-10-23 | Resolved: 2022-11-22

## 2022-12-05 DIAGNOSIS — E29.1 SECONDARY MALE HYPOGONADISM: ICD-10-CM

## 2022-12-05 RX ORDER — TESTOSTERONE CYPIONATE 200 MG/ML
INJECTION INTRAMUSCULAR
Qty: 2 ML | Refills: 5 | Status: SHIPPED | OUTPATIENT
Start: 2022-12-05

## 2023-02-19 NOTE — H&P
History and Physical    Subjective:     Billy Carranza is a 77 y.o.  male who presents with falls. Onset of symptoms was gradual with unchanged course for a number of years. The pain is located lateral left thigh. Patient describes the pain as continuous and rated as moderate. Pain has been associated with a wound approximately 3 cm. Patient denies sob. Symptoms are aggravated by falls. Symptoms improve with opening up wound with razor blade to relieve pus. Previous studies include wbc of 13.7. Past Medical History:   Diagnosis Date    Anemia NEC     Asthma     Chronic pain     Due to spinal stenosis    Constipation     HTN (hypertension)     Hypertension     Hypogonadism male     Other ill-defined conditions(799.89)     spinal stenosis    Psychiatric disorder     major depressive disorder    Sleep trouble     Spinal stenosis     Cervical and Lumbar    Vertebral compression fracture (HCC)       Past Surgical History:   Procedure Laterality Date    HX BACK SURGERY      HX CATARACT REMOVAL Bilateral     HX CERVICAL LAMINECTOMY      HX TONSILLECTOMY       Family History   Problem Relation Age of Onset    Heart Disease Father     Hypertension Father     Stroke Father       Social History     Tobacco Use    Smoking status: Never Smoker    Smokeless tobacco: Never Used   Substance Use Topics    Alcohol use: No     Comment: Drank six weeks ago. Prior to Admission medications    Medication Sig Start Date End Date Taking? Authorizing Provider   zolpidem (AMBIEN) 10 mg tablet Take  by mouth nightly as needed for Sleep. Yes Provider, Historical   testosterone cypionate (DEPOTESTOTERONE CYPIONATE) 200 mg/mL injection INJECT 0.5 MILLILITERS ONCE EVERY 7 DAYS 6/28/18  Yes Ebony Huff MD   buPROPion XL (WELLBUTRIN XL) 300 mg XL tablet  1/23/17  Yes Provider, Historical   VOLTAREN 1 % gel  1/21/17  Yes Provider, Historical   LYRICA 225 mg capsule 225 mg two (2) times a day. 12/16/15  Yes Provider, Historical   tiZANidine (ZANAFLEX) 2 mg tablet  10/23/15  Yes Provider, Historical   methadone (DOLOPHINE) 10 mg tablet Take 10 mg by mouth two (2) times a day. Yes Provider, Historical   HYDROcodone-acetaminophen (NORCO)  mg tablet Take  by mouth. 5 tabs daily   Yes Provider, Historical   lisinopril (PRINIVIL, ZESTRIL) 40 mg tablet Take 1 tablet by mouth daily. 9/11/14  Yes Carson Espinosa MD   polyethylene glycol [facility]) 17 gram/dose powder 17 g four (4) times daily. 8/24/14  Yes Provider, Historical   LORazepam (ATIVAN) 1 mg tablet Take 1 Tab by mouth every eight (8) hours as needed. 6/29/14  Yes Denise Vela MD   omeprazole (PRILOSEC) 20 mg capsule TAKE ONE CAPSULE BY MOUTH EVERY DAY 5/27/13  Yes Heather Brar MD   carvedilol (COREG) 25 mg tablet TAKE ONE TABLET BY MOUTH TWICE A DAY WITH MEALS 11/13/18   Carson Espinosa MD   ONDANSETRON PO Take  by mouth. Provider, Historical   Syringe with Needle, Disp, (BD LUER-HANG SYRINGE) 3 mL 21 gauge x 1\" syrg USE AS DIRECTED every 7 days. 6/28/18   Carson Espinosa MD   methylphenidate (RITALIN) 10 mg tablet 3 tabs daily 12/4/15   Provider, Historical   CALCIUM 600 WITH VITAMIN D3 600 mg(1,500mg) -400 unit chew  12/16/15   Provider, Historical   DULoxetine (CYMBALTA) 60 mg capsule Take 120 mg by mouth daily. Provider, Historical     Allergies   Allergen Reactions    Amitriptyline Other (comments)     hallucinating    Neurontin [Gabapentin] Other (comments)     insomnia    Percocet [Oxycodone-Acetaminophen] Nausea Only and Anxiety     Pt states he can tolerate small doses at 10 mg a day        Review of Systems:  A comprehensive review of systems was negative except for that written in the History of Present Illness. Objective: Intake and Output:    No intake/output data recorded.   01/11 1901 - 01/13 0700  In: 1050 [I.V.:1050]  Out: -     Physical Exam:   Visit Vitals  /76 (BP 1 Location: Left arm, BP Patient Position: At rest)   Pulse 87   Temp 99.3 °F (37.4 °C)   Resp 16   Ht 6' 7\" (2.007 m)   Wt 99.8 kg (220 lb)   SpO2 97%   BMI 24.78 kg/m²     General:  Alert, cooperative, no distress, appears stated age. Head:  Normocephalic, without obvious abnormality, atraumatic. Eyes:  Conjunctivae/corneas clear. PERRL, EOMs intact. Fundi benign   Ears:  Normal TMs and external ear canals both ears. Nose: Nares normal. Septum midline. Mucosa normal. No drainage or sinus tenderness. Throat: Lips, mucosa, and tongue normal. Teeth and gums normal.   Neck: Supple, symmetrical, trachea midline, no adenopathy, thyroid: no enlargement/tenderness/nodules, no carotid bruit and no JVD. Back:   Symmetric, no curvature. ROM normal. No CVA tenderness. Lungs:   Clear to auscultation bilaterally. Chest wall:  No tenderness or deformity. Heart:  Regular rate and rhythm, S1, S2 normal, no murmur, click, rub or gallop. Extremities: Decreased strength bilaterally  Left lateral thigh with mild tenderness with scabs over 3cm wound     Pulses: 2+ and symmetric all extremities. Skin: Skin color, texture, turgor normal. No rashes or lesions   Lymph nodes: Cervical, supraclavicular, and axillary nodes normal.   Neurologic: CNII-XII intact. Normal strength, sensation and reflexes throughout.            Data Review:   Recent Results (from the past 24 hour(s))   CBC WITH AUTOMATED DIFF    Collection Time: 01/12/19 10:35 PM   Result Value Ref Range    WBC 13.4 (H) 4.1 - 11.1 K/uL    RBC 3.84 (L) 4.10 - 5.70 M/uL    HGB 11.6 (L) 12.1 - 17.0 g/dL    HCT 36.0 (L) 36.6 - 50.3 %    MCV 93.8 80.0 - 99.0 FL    MCH 30.2 26.0 - 34.0 PG    MCHC 32.2 30.0 - 36.5 g/dL    RDW 13.8 11.5 - 14.5 %    PLATELET 064 315 - 597 K/uL    MPV 11.0 8.9 - 12.9 FL    NRBC 0.0 0  WBC    ABSOLUTE NRBC 0.00 0.00 - 0.01 K/uL    NEUTROPHILS 84 (H) 32 - 75 %    LYMPHOCYTES 5 (L) 12 - 49 %    MONOCYTES 10 5 - 13 %    EOSINOPHILS 0 0 - 7 % BASOPHILS 0 0 - 1 %    IMMATURE GRANULOCYTES 1 (H) 0.0 - 0.5 %    ABS. NEUTROPHILS 11.3 (H) 1.8 - 8.0 K/UL    ABS. LYMPHOCYTES 0.7 (L) 0.8 - 3.5 K/UL    ABS. MONOCYTES 1.3 (H) 0.0 - 1.0 K/UL    ABS. EOSINOPHILS 0.0 0.0 - 0.4 K/UL    ABS. BASOPHILS 0.0 0.0 - 0.1 K/UL    ABS. IMM. GRANS. 0.1 (H) 0.00 - 0.04 K/UL    DF SMEAR SCANNED      RBC COMMENTS NORMOCYTIC, NORMOCHROMIC      WBC COMMENTS TOXIC GRANULATION     METABOLIC PANEL, COMPREHENSIVE    Collection Time: 01/12/19 10:35 PM   Result Value Ref Range    Sodium 140 136 - 145 mmol/L    Potassium 4.0 3.5 - 5.1 mmol/L    Chloride 103 97 - 108 mmol/L    CO2 27 21 - 32 mmol/L    Anion gap 10 5 - 15 mmol/L    Glucose 120 (H) 65 - 100 mg/dL    BUN 36 (H) 6 - 20 MG/DL    Creatinine 1.25 0.70 - 1.30 MG/DL    BUN/Creatinine ratio 29 (H) 12 - 20      GFR est AA >60 >60 ml/min/1.73m2    GFR est non-AA 57 (L) >60 ml/min/1.73m2    Calcium 8.9 8.5 - 10.1 MG/DL    Bilirubin, total 1.4 (H) 0.2 - 1.0 MG/DL    ALT (SGPT) 31 12 - 78 U/L    AST (SGOT) 61 (H) 15 - 37 U/L    Alk.  phosphatase 86 45 - 117 U/L    Protein, total 6.1 (L) 6.4 - 8.2 g/dL    Albumin 2.7 (L) 3.5 - 5.0 g/dL    Globulin 3.4 2.0 - 4.0 g/dL    A-G Ratio 0.8 (L) 1.1 - 2.2     CK    Collection Time: 01/12/19 10:35 PM   Result Value Ref Range    CK 1,324 (H) 39 - 850 U/L   METABOLIC PANEL, BASIC    Collection Time: 01/13/19  5:17 AM   Result Value Ref Range    Sodium 139 136 - 145 mmol/L    Potassium 4.1 3.5 - 5.1 mmol/L    Chloride 106 97 - 108 mmol/L    CO2 26 21 - 32 mmol/L    Anion gap 7 5 - 15 mmol/L    Glucose 117 (H) 65 - 100 mg/dL    BUN 37 (H) 6 - 20 MG/DL    Creatinine 1.04 0.70 - 1.30 MG/DL    BUN/Creatinine ratio 36 (H) 12 - 20      GFR est AA >60 >60 ml/min/1.73m2    GFR est non-AA >60 >60 ml/min/1.73m2    Calcium 8.5 8.5 - 10.1 MG/DL   CBC WITH AUTOMATED DIFF    Collection Time: 01/13/19  5:17 AM   Result Value Ref Range    WBC 13.7 (H) 4.1 - 11.1 K/uL    RBC 3.46 (L) 4.10 - 5.70 M/uL    HGB 10.7 (L) 12.1 - 17.0 g/dL    HCT 32.5 (L) 36.6 - 50.3 %    MCV 93.9 80.0 - 99.0 FL    MCH 30.9 26.0 - 34.0 PG    MCHC 32.9 30.0 - 36.5 g/dL    RDW 13.8 11.5 - 14.5 %    PLATELET 835 365 - 665 K/uL    MPV 11.5 8.9 - 12.9 FL    NRBC 0.0 0  WBC    ABSOLUTE NRBC 0.00 0.00 - 0.01 K/uL    NEUTROPHILS 75 32 - 75 %    BAND NEUTROPHILS 7 (H) 0 - 6 %    LYMPHOCYTES 12 12 - 49 %    MONOCYTES 5 5 - 13 %    EOSINOPHILS 1 0 - 7 %    BASOPHILS 0 0 - 1 %    IMMATURE GRANULOCYTES 0 %    ABS. NEUTROPHILS 11.3 (H) 1.8 - 8.0 K/UL    ABS. LYMPHOCYTES 1.6 0.8 - 3.5 K/UL    ABS. MONOCYTES 0.7 0.0 - 1.0 K/UL    ABS. EOSINOPHILS 0.1 0.0 - 0.4 K/UL    ABS. BASOPHILS 0.0 0.0 - 0.1 K/UL    ABS. IMM. GRANS. 0.0 K/UL    DF MANUAL      RBC COMMENTS NORMOCYTIC, NORMOCHROMIC             Assessment:     Principal Problem:    Cellulitis (1/13/2019)    djd of back, unstable gait  Hx of astham, anemia, htn    Plan:     Admit to med surge  Cont unasyn and monitor cultures and wound  ptot for unstable gait with hx of severe djd of back  qm for rollator at pt request  Resume home pain meds (sees Dr. Gabriela Desouza pain management)  Resume coreg and prinivil  Add duonebs prn. Full code.     Signed By: Jorge Goodson,      January 13, 2019 DISPLAY PLAN FREE TEXT

## 2023-05-24 ENCOUNTER — OFFICE VISIT (OUTPATIENT)
Age: 73
End: 2023-05-24
Payer: MEDICARE

## 2023-05-24 DIAGNOSIS — R97.20 ELEVATED PROSTATE SPECIFIC ANTIGEN (PSA): ICD-10-CM

## 2023-05-24 DIAGNOSIS — Z12.5 ENCOUNTER FOR SCREENING FOR MALIGNANT NEOPLASM OF PROSTATE: ICD-10-CM

## 2023-05-24 DIAGNOSIS — I10 ESSENTIAL (PRIMARY) HYPERTENSION: ICD-10-CM

## 2023-05-24 DIAGNOSIS — E29.1 MALE HYPOGONADISM: Primary | ICD-10-CM

## 2023-05-24 PROCEDURE — 99443 PR PHYS/QHP TELEPHONE EVALUATION 21-30 MIN: CPT | Performed by: INTERNAL MEDICINE

## 2023-05-24 RX ORDER — FLUTICASONE PROPIONATE 50 MCG
SPRAY, SUSPENSION (ML) NASAL
COMMUNITY

## 2023-05-24 RX ORDER — PREGABALIN 225 MG/1
225 CAPSULE ORAL 2 TIMES DAILY
COMMUNITY
Start: 2023-05-02

## 2023-05-24 RX ORDER — NALOXEGOL OXALATE 25 MG/1
25 TABLET, FILM COATED ORAL DAILY
COMMUNITY
Start: 2023-05-02

## 2023-05-24 RX ORDER — LISINOPRIL 40 MG/1
40 TABLET ORAL DAILY
COMMUNITY
Start: 2023-05-02

## 2023-05-24 RX ORDER — TESTOSTERONE CYPIONATE 200 MG/ML
INJECTION, SOLUTION INTRAMUSCULAR
COMMUNITY
Start: 2023-04-25

## 2023-05-24 RX ORDER — METHADONE HYDROCHLORIDE 10 MG/1
TABLET ORAL
COMMUNITY
Start: 2019-01-24

## 2023-05-24 RX ORDER — CARVEDILOL 25 MG/1
TABLET ORAL
COMMUNITY
Start: 2023-05-02

## 2023-05-24 RX ORDER — DOCUSATE SODIUM 100 MG/1
CAPSULE, LIQUID FILLED ORAL
COMMUNITY
Start: 2023-05-02

## 2023-05-24 RX ORDER — CLONAZEPAM 0.5 MG/1
TABLET ORAL
COMMUNITY
Start: 2023-05-02

## 2023-05-24 RX ORDER — BUPROPION HYDROCHLORIDE 300 MG/1
300 TABLET ORAL EVERY MORNING
COMMUNITY
Start: 2023-05-02

## 2023-05-24 RX ORDER — ESZOPICLONE 2 MG/1
2 TABLET, FILM COATED ORAL NIGHTLY
COMMUNITY
Start: 2023-05-02

## 2023-05-24 RX ORDER — OMEPRAZOLE 20 MG/1
CAPSULE, DELAYED RELEASE ORAL
COMMUNITY
Start: 2023-05-02

## 2023-05-24 RX ORDER — CITALOPRAM 20 MG/1
20 TABLET ORAL EVERY MORNING
COMMUNITY
Start: 2023-05-02

## 2023-05-24 RX ORDER — TIZANIDINE 4 MG/1
1 TABLET ORAL 2 TIMES DAILY PRN
COMMUNITY
Start: 2020-07-08

## 2023-05-24 RX ORDER — MECLIZINE HYDROCHLORIDE 25 MG/1
TABLET ORAL
COMMUNITY
Start: 2023-05-01

## 2023-05-24 RX ORDER — HYDROCODONE BITARTRATE AND ACETAMINOPHEN 10; 325 MG/1; MG/1
TABLET ORAL
COMMUNITY
Start: 2023-05-04

## 2023-05-24 NOTE — PATIENT INSTRUCTIONS
1) Continue taking testosterone injections with 80 mg = 0.4 ml every Friday    2) Please call your PCP's office and see if they are able to come to your house on a Monday or Tuesday to draw the labs using the enclosed lab slip and fax the results to my attention at [phone]. If they are unable to draw your blood, then please make arrangements to have labs drawn at [facility] on a Monday or Tuesday using the slip. 3) Please come for a follow up visit on 11/28/23 at 2:10pm in our [place] office. 4) Our office is located at:  [address] [place] [address] ([facility]), 3rd floor, [address], [address] [place] [address]  [phone] (phone)  [phone] (fax)

## 2023-05-24 NOTE — PROGRESS NOTES
Chief Complaint   Patient presents with    Hypogonadism     [phone] phone call only per pt request    Other     PCP and pharmacy confirmed        **THIS IS A VIRTUAL VISIT VIA A TELEPHONE ENCOUNTER. PATIENT AGREED TO HAVE THEIR CARE DELIVERED OVER THE PHONE IN PLACE OF THEIR REGULARLY SCHEDULED OFFICE VISIT**    History of Present Illness: Jaziel Fernandez is a 68 y.o. male here for follow up of hypogonadism. No major change to health since last visit in 1/22. Has noticed more leg weakness from spinal stenosis. Has been taking testosterone injections every Friday 80 mg = 0.4 ml and if he misses a dose will take on Saturday. His old PCP retired and now he is using a company for primary care that comes to his house given it's difficult to get around from back pain. Current Outpatient Medications   Medication Sig    buPROPion (WELLBUTRIN XL) 300 MG extended release tablet Take 1 tablet by mouth every morning    carvedilol (COREG) 25 MG tablet TAKE ONE TABLET BY MOUTH TWICE DAILY FOR HEART    citalopram (CELEXA) 20 MG tablet Take 1 tablet by mouth every morning    clonazePAM (KLONOPIN) 0.5 MG tablet TAKE ONE TABLET BY MOUTH THREE TIMES DAILY AS NEEDED FOR ANXIETY    diclofenac sodium (VOLTAREN) 1 % GEL APPLY 4 GRAMS TOPICALLY TO KNEES THREE TIMES A DAY    docusate sodium (COLACE) 100 MG capsule TAKE ONE CAPSULE BY MOUTH THREE TIMES DAILY AS NEEDED FOR CONSTIPATION    eszopiclone (LUNESTA) 2 MG TABS Take 1 tablet by mouth nightly.     fluticasone (FLONASE) 50 MCG/ACT nasal spray 1 spray(s) intranasally once a day for 30 day(s)    HYDROcodone-acetaminophen (NORCO)  MG per tablet TAKE ONE TABLET BY MOUTH FOUR TIMES DAILY    lisinopril (PRINIVIL;ZESTRIL) 40 MG tablet Take 1 tablet by mouth daily    meclizine (ANTIVERT) 25 MG tablet TAKE 1 TABLET BY MOUTH THREE TIMES A DAY AS NEEDED FOR PERSISTENT DIZZINESS (NOTE ONLY IF NEEDED)    methadone (DOLOPHINE) 10 MG tablet 0.5 tab(s) orally 4 times a day    MOVANTIK

## 2023-06-28 DIAGNOSIS — E29.1 MALE HYPOGONADISM: Primary | ICD-10-CM

## 2023-06-28 RX ORDER — TESTOSTERONE CYPIONATE 200 MG/ML
INJECTION, SOLUTION INTRAMUSCULAR
Qty: 2 ML | Refills: 5 | Status: SHIPPED | OUTPATIENT
Start: 2023-06-28 | End: 2023-12-25

## (undated) DEVICE — 3000CC GUARDIAN II: Brand: GUARDIAN

## (undated) DEVICE — GAUZE SPONGES,12 PLY: Brand: CURITY

## (undated) DEVICE — TOWEL SURG W17XL27IN STD BLU COT NONFENESTRATED PREWASHED

## (undated) DEVICE — CULTURETTE SGL EVAC TUBE PALL -- 100/CA

## (undated) DEVICE — TRAY PREP DRY W/ PREM GLV 2 APPL 6 SPNG 2 UNDPD 1 OVERWRAP

## (undated) DEVICE — STRETCH BANDAGE ROLL: Brand: DERMACEA

## (undated) DEVICE — SWAB CULT LIQ STUART AGR AERB MOD IN BRK SGL RAYON TIP PLAS 220099] BECTON DICKINSON MICRO]

## (undated) DEVICE — NEEDLE HYPO 22GA L1.5IN BLK S STL HUB POLYPR SHLD REG BVL

## (undated) DEVICE — REM POLYHESIVE ADULT PATIENT RETURN ELECTRODE: Brand: VALLEYLAB

## (undated) DEVICE — SURGICAL PROCEDURE PACK BASIN MAJ SET CUST NO CAUT

## (undated) DEVICE — STERILE POLYISOPRENE POWDER-FREE SURGICAL GLOVES: Brand: PROTEXIS

## (undated) DEVICE — DRAPE,EXTREMITY,89X128,STERILE: Brand: MEDLINE

## (undated) DEVICE — NEEDLE HYPO 25GA L1.5IN BLU POLYPR HUB S STL REG BVL STR

## (undated) DEVICE — LIGHT HANDLE: Brand: DEVON

## (undated) DEVICE — SOLUTION IV 1000ML 0.9% SOD CHL

## (undated) DEVICE — INFECTION CONTROL KIT SYS

## (undated) DEVICE — SYR 10ML LUER LOK 1/5ML GRAD --